# Patient Record
Sex: FEMALE | HISPANIC OR LATINO | Employment: PART TIME | URBAN - METROPOLITAN AREA
[De-identification: names, ages, dates, MRNs, and addresses within clinical notes are randomized per-mention and may not be internally consistent; named-entity substitution may affect disease eponyms.]

---

## 2018-04-24 ENCOUNTER — APPOINTMENT (OUTPATIENT)
Dept: GENERAL RADIOLOGY | Facility: CLINIC | Age: 38
End: 2018-04-24
Attending: EMERGENCY MEDICINE

## 2018-04-24 ENCOUNTER — HOSPITAL ENCOUNTER (EMERGENCY)
Facility: CLINIC | Age: 38
Discharge: HOME | End: 2018-04-25
Attending: EMERGENCY MEDICINE | Admitting: EMERGENCY MEDICINE

## 2018-04-24 DIAGNOSIS — R07.89 ATYPICAL CHEST PAIN: ICD-10-CM

## 2018-04-24 LAB
BASOPHILS # BLD AUTO: 0 10E9/L (ref 0–0.2)
BASOPHILS NFR BLD AUTO: 0.3 %
DIFFERENTIAL METHOD BLD: NORMAL
EOSINOPHIL # BLD AUTO: 0.3 10E9/L (ref 0–0.7)
EOSINOPHIL NFR BLD AUTO: 2.4 %
ERYTHROCYTE [DISTWIDTH] IN BLOOD BY AUTOMATED COUNT: 12.3 % (ref 10–15)
HCT VFR BLD AUTO: 40.2 % (ref 35–47)
HGB BLD-MCNC: 13.9 G/DL (ref 11.7–15.7)
IMM GRANULOCYTES # BLD: 0 10E9/L (ref 0–0.4)
IMM GRANULOCYTES NFR BLD: 0.4 %
LYMPHOCYTES # BLD AUTO: 4.3 10E9/L (ref 0.8–5.3)
LYMPHOCYTES NFR BLD AUTO: 41.7 %
MCH RBC QN AUTO: 30.4 PG (ref 26.5–33)
MCHC RBC AUTO-ENTMCNC: 34.6 G/DL (ref 31.5–36.5)
MCV RBC AUTO: 88 FL (ref 78–100)
MONOCYTES # BLD AUTO: 0.5 10E9/L (ref 0–1.3)
MONOCYTES NFR BLD AUTO: 4.7 %
NEUTROPHILS # BLD AUTO: 5.3 10E9/L (ref 1.6–8.3)
NEUTROPHILS NFR BLD AUTO: 50.5 %
NRBC # BLD AUTO: 0 10*3/UL
NRBC BLD AUTO-RTO: 0 /100
PLATELET # BLD AUTO: 259 10E9/L (ref 150–450)
RBC # BLD AUTO: 4.57 10E12/L (ref 3.8–5.2)
WBC # BLD AUTO: 10.4 10E9/L (ref 4–11)

## 2018-04-24 PROCEDURE — 93005 ELECTROCARDIOGRAM TRACING: CPT | Performed by: EMERGENCY MEDICINE

## 2018-04-24 PROCEDURE — 84484 ASSAY OF TROPONIN QUANT: CPT | Performed by: EMERGENCY MEDICINE

## 2018-04-24 PROCEDURE — 85025 COMPLETE CBC W/AUTO DIFF WBC: CPT | Performed by: EMERGENCY MEDICINE

## 2018-04-24 PROCEDURE — 93010 ELECTROCARDIOGRAM REPORT: CPT | Mod: Z6 | Performed by: EMERGENCY MEDICINE

## 2018-04-24 PROCEDURE — 80048 BASIC METABOLIC PNL TOTAL CA: CPT | Performed by: EMERGENCY MEDICINE

## 2018-04-24 PROCEDURE — 99285 EMERGENCY DEPT VISIT HI MDM: CPT | Mod: 25 | Performed by: EMERGENCY MEDICINE

## 2018-04-24 PROCEDURE — 71045 X-RAY EXAM CHEST 1 VIEW: CPT

## 2018-04-24 PROCEDURE — 99284 EMERGENCY DEPT VISIT MOD MDM: CPT | Mod: 25 | Performed by: EMERGENCY MEDICINE

## 2018-04-24 PROCEDURE — 96374 THER/PROPH/DIAG INJ IV PUSH: CPT | Performed by: EMERGENCY MEDICINE

## 2018-04-24 NOTE — ED AVS SNAPSHOT
[facility], [place], Emergency Department    [address]    [facility] [address]    Phone:  [phone]    Fax:  [phone]                                       Gloria Escobar   MRN: [id number]    Department:  [facility], Emergency Department   Date of Visit:  4/24/2018           After Visit Summary Signature Page     I have received my discharge instructions, and my questions have been answered. I have discussed any challenges I see with this plan with the nurse or doctor.    ..........................................................................................................................................  Patient/Patient Representative Signature      ..........................................................................................................................................  Patient Representative Print Name and Relationship to Patient    ..................................................               ................................................  Date                                            Time    ..........................................................................................................................................  Reviewed by Signature/Title    ...................................................              ..............................................  Date                                                            Time

## 2018-04-24 NOTE — ED AVS SNAPSHOT
[facility], Emergency Department    [address] [place] [address]    Phone:  [phone]    Fax:  [phone]                                       Gloria Escobra   MRN: [id number]    Department:  [facility], Emergency Department   Date of Visit:  4/24/2018           Patient Information     Date Of Birth          1980        Your diagnoses for this visit were:     Atypical chest pain        You were seen by Rivera Torres MD.      Follow-up Information     Follow up with [facility] In 1 week.    Contact information:    [address] [id number] [phone]          Follow up with [facility], Emergency Department.    Specialty:  EMERGENCY MEDICINE    Why:  If symptoms worsen    Contact information:    [address] [place] [id number] [phone]    Additional information:    The [facility] is located in the [facility]. lt is easily accessible from virtually any point in the [facility] area, via [address]        Discharge Instructions         * Dolor En El Pecho:Causa No Determinada [Chest Pain, Uncertain Cause]    Según harmon examen de hoy, no se pudo determinar exactamente por qué siente dolor en el pecho. Harmon afección no parece seria en mellissa momento y el dolor que siente no parece provenir del corazón. Sin embargo, en ocasiones, los síntomas de un problema alexandr tardan más en aparecer. Por lo tanto, es importante que preste atención a las advertencias descritas a continuación.  Cuidados En [place]:  1. Descanse hoy y evite toda actividad agotadora.  2. [place] el medicamento que le hayan recetado según le hayan indicado.  Programe bandar VISITA DE CONTROL con harmon médico en 1-3 días.  Busque Prontamente Atención Médica  si algo de lo siguiente ocurre:    Un cambio en el tipo de dolor: se siente diferente, se ha vuelto más stephanie, dura más o comienza a esparcirse al hombro, el narda, el  clement, la mandíbula o la espalda.    Dificultad para respirar o más dolor al respirar.    Debilidad, mareo o desmayo.    Tos con expulsión de esputo (flema [phlegm]) de color oscuro o con lashanda.    Fiebre de 101 F (38.3 C) o más kalyan.    Dolor, enrojecimiento o hinchazón de bandar pierna.    9888-4952 [employer]. [address] [place] [address]. All rights reserved. This information is not intended as a substitute for professional medical care. Always follow your healthcare professional's instructions.  This information has been modified by your health care provider with permission from the publisher.          24 Hour Appointment Hotline       To make an appointment at any [facility], call [phone] ([phone]). If you don't have a family doctor or clinic, we will help you find one. [facility] are conveniently located to serve the needs of you and your family.             Review of your medicines      Notice     You have not been prescribed any medications.            Procedures and tests performed during your visit     Basic metabolic panel    CBC with platelets differential    EKG 12 lead    EKG 12-lead, tracing only    ISTAT troponin nursing POCT    Troponin I    Troponin POCT    XR Chest Port 1 View      Orders Needing Specimen Collection     None      Pending Results     Date and Time Order Name Status Description    4/24/2018 2338 XR Chest Port 1 View Preliminary     4/24/2018 2314 EKG 12 lead Preliminary             Pending Culture Results     No orders found for last 3 day(s).            Pending Results Instructions     If you had any lab results that were not finalized at the time of your Discharge, you can call the ED Lab Result RN at [phone]. You will be contacted by this team for any positive Lab results or changes in treatment. The nurses are available 7 days a week from 10A to 6:30P.  You can leave a message 24 hours per day and they will return your  "call.        Thank you for choosing [facility]       Thank you for choosing [facility] for your care. Our goal is always to provide you with excellent care. Hearing back from our patients is one way we can continue to improve our services. Please take a few minutes to complete the written survey that you may receive in the mail after you visit with us. Thank you!        [employer] Information     [employer] lets you send messages to your doctor, view your test results, renew your prescriptions, schedule appointments and more. To sign up, go to [email or web address] . Click on \"Log in\" on the left side of the screen, which will take you to the Welcome page. Then click on \"Sign up Now\" on the right side of the page.     You will be asked to enter the access code listed below, as well as some personal information. Please follow the directions to create your username and password.     Your access code is: [email or web address]  Expires: 2018 12:55 AM     Your access code will  in 90 days. If you need help or a new code, please call your [facility] clinic or [phone].        Care EveryWhere ID     This is your Care EveryWhere ID. This could be used by other organizations to access your [facility] medical records  [id number]        Equal Access to Services     NASIR MORA AH: Hadvirgilio grissomo Sosharronali, waaxda luqadaha, qaybta kaalmada adeegyada, bee gibbons. So [facility] [phone].    ATENCIÓN: Si habla español, tiene a harmon disposición servicios gratuitos de asistencia lingüística. Llame al [phone].    We comply with applicable federal civil rights laws and [place] laws. We do not discriminate on the basis of race, color, national origin, age, disability, sex, sexual orientation, or gender identity.            After Visit Summary       This is your record. Keep this with you and show to your community pharmacist(s) and doctor(s) at your next visit.                  "

## 2018-04-25 VITALS
TEMPERATURE: 98.1 F | HEART RATE: 80 BPM | OXYGEN SATURATION: 98 % | SYSTOLIC BLOOD PRESSURE: 141 MMHG | WEIGHT: 203 LBS | RESPIRATION RATE: 16 BRPM | DIASTOLIC BLOOD PRESSURE: 86 MMHG

## 2018-04-25 LAB
ANION GAP SERPL CALCULATED.3IONS-SCNC: 11 MMOL/L (ref 3–14)
BUN SERPL-MCNC: 14 MG/DL (ref 7–30)
CALCIUM SERPL-MCNC: 8.7 MG/DL (ref 8.5–10.1)
CHLORIDE SERPL-SCNC: 103 MMOL/L (ref 94–109)
CO2 SERPL-SCNC: 24 MMOL/L (ref 20–32)
CREAT SERPL-MCNC: 0.55 MG/DL (ref 0.52–1.04)
GFR SERPL CREATININE-BSD FRML MDRD: >90 ML/MIN/1.7M2
GLUCOSE SERPL-MCNC: 393 MG/DL (ref 70–99)
INTERPRETATION ECG - MUSE: NORMAL
POTASSIUM SERPL-SCNC: 4 MMOL/L (ref 3.4–5.3)
SODIUM SERPL-SCNC: 138 MMOL/L (ref 133–144)
TROPONIN I BLD-MCNC: 0 UG/L (ref 0–0.1)
TROPONIN I SERPL-MCNC: <0.015 UG/L (ref 0–0.04)

## 2018-04-25 PROCEDURE — 84484 ASSAY OF TROPONIN QUANT: CPT

## 2018-04-25 PROCEDURE — 25000128 H RX IP 250 OP 636: Performed by: EMERGENCY MEDICINE

## 2018-04-25 RX ORDER — KETOROLAC TROMETHAMINE 15 MG/ML
15 INJECTION, SOLUTION INTRAMUSCULAR; INTRAVENOUS ONCE
Status: COMPLETED | OUTPATIENT
Start: 2018-04-25 | End: 2018-04-25

## 2018-04-25 RX ADMIN — KETOROLAC TROMETHAMINE 15 MG: 15 INJECTION, SOLUTION INTRAMUSCULAR; INTRAVENOUS at 00:29

## 2018-04-25 ASSESSMENT — ENCOUNTER SYMPTOMS
DIAPHORESIS: 0
COUGH: 0
LIGHT-HEADEDNESS: 0
SHORTNESS OF BREATH: 1
NAUSEA: 0
WEAKNESS: 0
NUMBNESS: 1
FEVER: 0
VOMITING: 0
CHILLS: 0

## 2018-04-25 NOTE — DISCHARGE INSTRUCTIONS
* Dolor En El Pecho:Causa No Determinada [Chest Pain, Uncertain Cause]    Según harmon examen de hoy, no se pudo determinar exactamente por qué siente dolor en el pecho. Harmon afección no parece seria en mellissa momento y el dolor que siente no parece provenir del corazón. Sin embargo, en ocasiones, los síntomas de un problema alexandr tardan más en aparecer. Por lo tanto, es importante que preste atención a las advertencias descritas a continuación.  Cuidados En [place]:  1. Descanse hoy y evite toda actividad agotadora.  2. [place] el medicamento que le hayan recetado según le hayan indicado.  Programe bandar VISITA DE CONTROL con harmon médico en 1-3 días.  Busque Prontamente Atención Médica  si algo de lo siguiente ocurre:    Un cambio en el tipo de dolor: se siente diferente, se ha vuelto más stephanie, dura más o comienza a esparcirse al hombro, el brazo, el clement, la mandíbula o la espalda.    Dificultad para respirar o más dolor al respirar.    Debilidad, mareo o desmayo.    Tos con expulsión de esputo (flema [phlegm]) de color oscuro o con lashanda.    Fiebre de 101 F (38.3 C) o más kalyan.    Dolor, enrojecimiento o hinchazón de bandar pierna.    8098-1889 [employer]. [address] [place] [address], [place] [address]. All rights reserved. This information is not intended as a substitute for professional medical care. Always follow your healthcare professional's instructions.  This information has been modified by your health care provider with permission from the publisher.

## 2018-04-25 NOTE — ED PROVIDER NOTES
"  History     Chief Complaint   Patient presents with     Chest Pain     left sided chest pain for two days, started when she was cleaning, reports she was told she has a vessel in her hear that is smaller than normal, shortness of breath     The history is provided by the patient. The history is limited by a language barrier. A  was used.     Gloria Escobar is a 37 year old female who presents to the ED for the evaluation of chest pain. The patient reports that she has been experiencing chest pain and numbness in her left hand for the past month, but over the past two days has now had worsening left-sided, nonradiating chest pain. In addition, her left hand numbness is now more persistent where it has been present for the past two days. She denies any obvious precipitating or palliative factors. No recorded fevers, chills, or coughing. No associated trauma. The patient reports a history of a \"small vessel\" in her heart that was diagnosed years ago, and as such, she presents for evaluation with worry that this may be related to her heart.          PAST MEDICAL HISTORY: No past medical history on file.    PAST SURGICAL HISTORY: No past surgical history on file.    FAMILY HISTORY: No family history on file.    SOCIAL HISTORY:   Social History   Substance Use Topics     Smoking status: Never Smoker     Smokeless tobacco: Never Used     Alcohol use No       Patient's Medications    No medications on file        No Known Allergies      I have reviewed the Medications, Allergies, Past Medical and Surgical History, and Social History in the Epic system.    Review of Systems   Constitutional: Negative for chills, diaphoresis and fever.   Respiratory: Positive for shortness of breath. Negative for cough.    Cardiovascular: Positive for chest pain.   Gastrointestinal: Negative for nausea and vomiting.   Neurological: Positive for numbness. Negative for weakness and light-headedness.   All " other systems reviewed and are negative.      Physical Exam   BP: (!) 141/117  Pulse: 69  Temp: 98.3  F (36.8  C)  Resp: 16  Weight: 92.1 kg (203 lb)  SpO2: 99 %      Physical Exam   Constitutional: She is oriented to person, place, and time. She appears well-developed. No distress.   HENT:   Head: Normocephalic and atraumatic.   Mouth/Throat: Oropharynx is clear and moist.   Eyes: Pupils are equal, round, and reactive to light.   Cardiovascular: Normal rate, regular rhythm and normal heart sounds.    Pulmonary/Chest: Effort normal. No stridor. No respiratory distress. She has no wheezes. She has no rales. She exhibits no tenderness.   Abdominal: Soft. She exhibits no distension. There is no tenderness. There is no rebound.   Musculoskeletal: She exhibits no edema.   Neurological: She is alert and oriented to person, place, and time. No cranial nerve deficit.   Skin: Skin is warm. No rash noted. She is not diaphoretic.   Psychiatric: She has a normal mood and affect.       ED Course     ED Course     Procedures       Labs Ordered and Resulted from Time of ED Arrival Up to the Time of Departure from the ED   CBC WITH PLATELETS DIFFERENTIAL   BASIC METABOLIC PANEL   TROPONIN I   ISTAT TROPONIN NURSING POCT            Assessments & Plan (with Medical Decision Making)     37-year-old female without known segment past medical history arriving to the emergency department with a catching sensation in her chest leading to intermittent coughing.  Upon arrival she is noted to be alert, afebrile, and hemodynamically stable with initial note of hypertension however on recheck noted to have diastolic pressures in the 80s.  Externally the patient has no sign of external trauma to the chest or skin rash.  She speaking full sentences without evidence of increased work of breathing.  She has no pleuritic type chest pain.  The patient is PERC negative and my suspicion at this time for pulmonary embolus warranting CT scan is quite  low.  By history this sounds atypical for aortic pathology, pneumothorax, pneumonia.  Chest x-ray is clear.  The patient has had approximately 1 month of symptoms somewhat worse over the past 1 day.  This sounds atypical for acute coronary syndrome.  Troponin obtained is negative.  EKG demonstrates no sign of strain or ischemic type pattern.  At this time I do not believe she benefit from serial cardiac biomarkers.  I do have her believe she benefit from close outpatient follow-up with ongoing chest discomfort.  I discussed case with the patient and  who indicates understanding of when to call or return emergently such as increasing chest pain, lightheadedness, shortness of breath or other concern.    I have reviewed the nursing notes.    I have reviewed the findings, diagnosis, plan and need for follow up with the patient.    New Prescriptions    No medications on file       Final diagnoses:   Atypical chest pain   Fan RODARTE, am serving as a trained medical scribe to document services personally performed by Rivera Torres MD, based on the provider's statements to me.      Rivera RODARTE MD, was physically present and have reviewed and verified the accuracy of this note documented by Fan Montanez.       4/24/2018   [facility], EMERGENCY DEPARTMENT     Rivera Torres MD  04/25/18 0124

## 2019-04-01 ENCOUNTER — TRANSFERRED RECORDS (OUTPATIENT)
Dept: MULTI SPECIALTY CLINIC | Facility: CLINIC | Age: 39
End: 2019-04-01

## 2019-04-01 LAB — PAP SMEAR - HIM PATIENT REPORTED: NEGATIVE

## 2020-03-24 ENCOUNTER — TELEPHONE (OUTPATIENT)
Dept: OBGYN | Facility: CLINIC | Age: 40
End: 2020-03-24

## 2020-03-24 ENCOUNTER — PRENATAL OFFICE VISIT (OUTPATIENT)
Dept: NURSING | Facility: CLINIC | Age: 40
End: 2020-03-24

## 2020-03-24 VITALS
TEMPERATURE: 97.3 F | DIASTOLIC BLOOD PRESSURE: 86 MMHG | HEART RATE: 80 BPM | BODY MASS INDEX: 30.45 KG/M2 | HEIGHT: 67 IN | SYSTOLIC BLOOD PRESSURE: 139 MMHG | WEIGHT: 194 LBS

## 2020-03-24 DIAGNOSIS — O09.529 HIGH-RISK PREGNANCY, ELDERLY MULTIGRAVIDA, UNSPECIFIED TRIMESTER: Primary | ICD-10-CM

## 2020-03-24 DIAGNOSIS — E11.9 TYPE 2 DIABETES MELLITUS (H): ICD-10-CM

## 2020-03-24 DIAGNOSIS — I10 HTN (HYPERTENSION): ICD-10-CM

## 2020-03-24 DIAGNOSIS — I10 HTN (HYPERTENSION): Primary | ICD-10-CM

## 2020-03-24 LAB
ABO + RH BLD: NORMAL
ABO + RH BLD: NORMAL
ALBUMIN UR-MCNC: NEGATIVE MG/DL
ALT SERPL W P-5'-P-CCNC: 35 U/L (ref 0–50)
APPEARANCE UR: CLEAR
AST SERPL W P-5'-P-CCNC: 15 U/L (ref 0–45)
BILIRUB UR QL STRIP: NEGATIVE
BLD GP AB SCN SERPL QL: NORMAL
BLOOD BANK CMNT PATIENT-IMP: NORMAL
COLOR UR AUTO: YELLOW
CREAT SERPL-MCNC: 0.52 MG/DL (ref 0.52–1.04)
CREAT UR-MCNC: 66 MG/DL
ERYTHROCYTE [DISTWIDTH] IN BLOOD BY AUTOMATED COUNT: 13.8 % (ref 10–15)
GFR SERPL CREATININE-BSD FRML MDRD: >90 ML/MIN/{1.73_M2}
GLUCOSE UR STRIP-MCNC: >=1000 MG/DL
HBA1C MFR BLD: 8.5 % (ref 0–5.6)
HCT VFR BLD AUTO: 38.7 % (ref 35–47)
HGB BLD-MCNC: 13.4 G/DL (ref 11.7–15.7)
HGB UR QL STRIP: NEGATIVE
KETONES UR STRIP-MCNC: NEGATIVE MG/DL
LEUKOCYTE ESTERASE UR QL STRIP: NEGATIVE
MCH RBC QN AUTO: 30.9 PG (ref 26.5–33)
MCHC RBC AUTO-ENTMCNC: 34.6 G/DL (ref 31.5–36.5)
MCV RBC AUTO: 89 FL (ref 78–100)
NITRATE UR QL: NEGATIVE
PH UR STRIP: 5.5 PH (ref 5–7)
PLATELET # BLD AUTO: 276 10E9/L (ref 150–450)
PROT UR-MCNC: <0.05 G/L
PROT/CREAT 24H UR: NORMAL G/G CR (ref 0–0.2)
RBC # BLD AUTO: 4.34 10E12/L (ref 3.8–5.2)
SOURCE: ABNORMAL
SP GR UR STRIP: 1.01 (ref 1–1.03)
SPECIMEN EXP DATE BLD: NORMAL
TSH SERPL DL<=0.005 MIU/L-ACNC: 1.25 MU/L (ref 0.4–4)
UROBILINOGEN UR STRIP-ACNC: 0.2 EU/DL (ref 0.2–1)
WBC # BLD AUTO: 7.8 10E9/L (ref 4–11)

## 2020-03-24 PROCEDURE — 87086 URINE CULTURE/COLONY COUNT: CPT | Performed by: OBSTETRICS & GYNECOLOGY

## 2020-03-24 PROCEDURE — 87389 HIV-1 AG W/HIV-1&-2 AB AG IA: CPT | Performed by: OBSTETRICS & GYNECOLOGY

## 2020-03-24 PROCEDURE — 99207 ZZC NO CHARGE NURSE ONLY: CPT

## 2020-03-24 PROCEDURE — 85027 COMPLETE CBC AUTOMATED: CPT | Performed by: OBSTETRICS & GYNECOLOGY

## 2020-03-24 PROCEDURE — 86900 BLOOD TYPING SEROLOGIC ABO: CPT | Performed by: OBSTETRICS & GYNECOLOGY

## 2020-03-24 PROCEDURE — 81003 URINALYSIS AUTO W/O SCOPE: CPT | Performed by: OBSTETRICS & GYNECOLOGY

## 2020-03-24 PROCEDURE — 83036 HEMOGLOBIN GLYCOSYLATED A1C: CPT | Performed by: OBSTETRICS & GYNECOLOGY

## 2020-03-24 PROCEDURE — 84460 ALANINE AMINO (ALT) (SGPT): CPT | Performed by: OBSTETRICS & GYNECOLOGY

## 2020-03-24 PROCEDURE — 84450 TRANSFERASE (AST) (SGOT): CPT | Performed by: OBSTETRICS & GYNECOLOGY

## 2020-03-24 PROCEDURE — 99000 SPECIMEN HANDLING OFFICE-LAB: CPT | Performed by: OBSTETRICS & GYNECOLOGY

## 2020-03-24 PROCEDURE — 82565 ASSAY OF CREATININE: CPT | Performed by: OBSTETRICS & GYNECOLOGY

## 2020-03-24 PROCEDURE — 86901 BLOOD TYPING SEROLOGIC RH(D): CPT | Performed by: OBSTETRICS & GYNECOLOGY

## 2020-03-24 PROCEDURE — 83021 HEMOGLOBIN CHROMOTOGRAPHY: CPT | Mod: 90 | Performed by: OBSTETRICS & GYNECOLOGY

## 2020-03-24 PROCEDURE — 36415 COLL VENOUS BLD VENIPUNCTURE: CPT | Performed by: OBSTETRICS & GYNECOLOGY

## 2020-03-24 PROCEDURE — 87340 HEPATITIS B SURFACE AG IA: CPT | Performed by: OBSTETRICS & GYNECOLOGY

## 2020-03-24 PROCEDURE — 84156 ASSAY OF PROTEIN URINE: CPT | Performed by: OBSTETRICS & GYNECOLOGY

## 2020-03-24 PROCEDURE — 86762 RUBELLA ANTIBODY: CPT | Performed by: OBSTETRICS & GYNECOLOGY

## 2020-03-24 PROCEDURE — 86850 RBC ANTIBODY SCREEN: CPT | Performed by: OBSTETRICS & GYNECOLOGY

## 2020-03-24 PROCEDURE — 84443 ASSAY THYROID STIM HORMONE: CPT | Performed by: OBSTETRICS & GYNECOLOGY

## 2020-03-24 PROCEDURE — 86780 TREPONEMA PALLIDUM: CPT | Performed by: OBSTETRICS & GYNECOLOGY

## 2020-03-24 RX ORDER — LISINOPRIL 20 MG/1
TABLET ORAL
COMMUNITY
Start: 2020-01-09 | End: 2020-05-05

## 2020-03-24 RX ORDER — METFORMIN HCL 500 MG
TABLET, EXTENDED RELEASE 24 HR ORAL
Status: ON HOLD | COMMUNITY
Start: 2020-01-09 | End: 2020-10-25

## 2020-03-24 ASSESSMENT — MIFFLIN-ST. JEOR: SCORE: 1579.67

## 2020-03-24 NOTE — LETTER
March 25, 2020      Gloria Mariano Shawn  [address] [facility] [address]        Dear Ms.Sanchez Escobar,    We are writing to inform you of your test results.    Your test results fall within the expected range(s) or remain unchanged from previous results.  Please continue with current treatment plan.    Resulted Orders   ABO/Rh type and screen   Result Value Ref Range    ABO A     RH(D) Pos     Antibody Screen Neg     Test Valid Only At          [facility]    Specimen Expires 03/27/2020    Hepatitis B surface antigen   Result Value Ref Range    Hep B Surface Agn Nonreactive NR^Nonreactive   CBC with platelets   Result Value Ref Range    WBC 7.8 4.0 - 11.0 10e9/L    RBC Count 4.34 3.8 - 5.2 10e12/L    Hemoglobin 13.4 11.7 - 15.7 g/dL    Hematocrit 38.7 35.0 - 47.0 %    MCV 89 78 - 100 fl    MCH 30.9 26.5 - 33.0 pg    MCHC 34.6 31.5 - 36.5 g/dL    RDW 13.8 10.0 - 15.0 %    Platelet Count 276 150 - 450 10e9/L   HIV Antigen Antibody Combo   Result Value Ref Range    HIV Antigen Antibody Combo Nonreactive NR^Nonreactive          Comment:      HIV-1 p24 Ag & HIV-1/HIV-2 Ab Not Detected   Rubella Antibody IgG Quantitative   Result Value Ref Range    Rubella Antibody IgG Quantitative 78 IU/mL      Comment:      Positive.  Suggests previous exposure or immunization and probable immunity  Reference Range:    Unvaccinated Negative 0-7 IU/mL  Vaccinated or previous exposure Positive 10 IU/ml or greater     Treponema Abs w Reflex to RPR and Titer   Result Value Ref Range    Treponema Antibodies Nonreactive NR^Nonreactive      Comment:      Methodology Change: Test performed on the [employer] XL by Treponema   pallidum Total Antibodies Assay as of 3.17.2020.     Urine Culture Aerobic Bacterial   Result Value Ref Range    Specimen Description Midstream Urine     Culture Micro       50,000 to 100,000 colonies/mL  mixed urogenital ector  Susceptibility testing not  routinely done     UA without Microscopic   Result Value Ref Range    Color Urine Yellow     Appearance Urine Clear     Glucose Urine >=1000 (A) NEG^Negative mg/dL    Bilirubin Urine Negative NEG^Negative    Ketones Urine Negative NEG^Negative mg/dL    Specific Gravity Urine 1.015 1.003 - 1.035    Blood Urine Negative NEG^Negative    pH Urine 5.5 5.0 - 7.0 pH    Protein Albumin Urine Negative NEG^Negative mg/dL    Urobilinogen Urine 0.2 0.2 - 1.0 EU/dL    Nitrite Urine Negative NEG^Negative    Leukocyte Esterase Urine Negative NEG^Negative    Source Midstream Urine    Protein  random urine with Creat Ratio   Result Value Ref Range    Protein Random Urine <0.05 g/L    Protein Total Urine g/gr Creatinine Unable to calculate due to low value 0 - 0.2 g/g Cr   Creatinine   Result Value Ref Range    Creatinine 0.52 0.52 - 1.04 mg/dL    GFR Estimate >90 >60 mL/min/[1.73_m2]      Comment:      Non  GFR Calc  Starting 12/18/2018, serum creatinine based estimated GFR (eGFR) will be   calculated using the Chronic Kidney Disease Epidemiology Collaboration   (CKD-EPI) equation.      GFR Estimate If Black >90 >60 mL/min/[1.73_m2]      Comment:       GFR Calc  Starting 12/18/2018, serum creatinine based estimated GFR (eGFR) will be   calculated using the Chronic Kidney Disease Epidemiology Collaboration   (CKD-EPI) equation.     AST   Result Value Ref Range    AST 15 0 - 45 U/L   ALT   Result Value Ref Range    ALT 35 0 - 50 U/L   Hemoglobin A1c   Result Value Ref Range    Hemoglobin A1C 8.5 (H) 0 - 5.6 %      Comment:      Normal <5.7% Prediabetes 5.7-6.4%  Diabetes 6.5% or higher - adopted from ADA   consensus guidelines.  Results confirmed by repeat test     TSH with free T4 reflex   Result Value Ref Range    TSH 1.25 0.40 - 4.00 mU/L   Creatinine urine calculation only   Result Value Ref Range    Creatinine Urine 66 mg/dL       If you have any questions or concerns, please call the clinic at the  number listed above.       Sincerely,        Tatiana Siddiqi

## 2020-03-24 NOTE — LETTER
March 24, 2020      Gloria Escobar  [address] [facility] [address]        Dear Ms.Sanchez Escobar,    We are writing to inform you of your test results.    Test results indicate you may require additional follow up, see comment below.     Your hemoglobin A1c is significantly elevated and consistent with a diagnosis of uncontrolled diabetes.  We need you to see Diabetes Education ASAP to help manage this during your pregnancy.      Resulted Orders   CBC with platelets   Result Value Ref Range    WBC 7.8 4.0 - 11.0 10e9/L    RBC Count 4.34 3.8 - 5.2 10e12/L    Hemoglobin 13.4 11.7 - 15.7 g/dL    Hematocrit 38.7 35.0 - 47.0 %    MCV 89 78 - 100 fl    MCH 30.9 26.5 - 33.0 pg    MCHC 34.6 31.5 - 36.5 g/dL    RDW 13.8 10.0 - 15.0 %    Platelet Count 276 150 - 450 10e9/L   UA without Microscopic   Result Value Ref Range    Color Urine Yellow     Appearance Urine Clear     Glucose Urine >=1000 (A) NEG^Negative mg/dL    Bilirubin Urine Negative NEG^Negative    Ketones Urine Negative NEG^Negative mg/dL    Specific Gravity Urine 1.015 1.003 - 1.035    Blood Urine Negative NEG^Negative    pH Urine 5.5 5.0 - 7.0 pH    Protein Albumin Urine Negative NEG^Negative mg/dL    Urobilinogen Urine 0.2 0.2 - 1.0 EU/dL    Nitrite Urine Negative NEG^Negative    Leukocyte Esterase Urine Negative NEG^Negative    Source Midstream Urine    Creatinine   Result Value Ref Range    Creatinine 0.52 0.52 - 1.04 mg/dL    GFR Estimate >90 >60 mL/min/[1.73_m2]      Comment:      Non  GFR Calc  Starting 12/18/2018, serum creatinine based estimated GFR (eGFR) will be   calculated using the Chronic Kidney Disease Epidemiology Collaboration   (CKD-EPI) equation.      GFR Estimate If Black >90 >60 mL/min/[1.73_m2]      Comment:       GFR Calc  Starting 12/18/2018, serum creatinine based estimated GFR (eGFR) will be   calculated using the Chronic Kidney Disease Epidemiology Collaboration   (CKD-EPI) equation.      AST   Result Value Ref Range    AST 15 0 - 45 U/L   ALT   Result Value Ref Range    ALT 35 0 - 50 U/L   Hemoglobin A1c   Result Value Ref Range    Hemoglobin A1C 8.5 (H) 0 - 5.6 %      Comment:      Normal <5.7% Prediabetes 5.7-6.4%  Diabetes 6.5% or higher - adopted from ADA   consensus guidelines.  Results confirmed by repeat test     TSH with free T4 reflex   Result Value Ref Range    TSH 1.25 0.40 - 4.00 mU/L       If you have any questions or concerns, please call the clinic at the number listed above.       Sincerely,        Radha Watson MD

## 2020-03-24 NOTE — PROGRESS NOTES
Patient seen today in clinic for NOB nurse intake( with  on the phone), third pregnancy, AMA.  Patient was diagnosis with hypertension and Type 2 diabetes about 1 year ago. She was taking metformin and lisinopril until 2 months ago when she found out she was pregnant. Hypertensive labs drawn today plus A1C with NOB labs. TSH drawn , patient's mother has history of thyroid cancer   Patient left without giving her advice about the medication, she was told to wait while consulting a MD    Patient stated that she was told by her primary to stop taking the medication until seen by OB  Handouts reviewed and given. Discussed NIPT. Has ultrasound and NOB with Dr Albarran 4/09/2020  Sent TE to Dr Moreno ( ON CALL) regarding this patient. Needs , understands some english  Patient denies any problems with her pregnancies. Advised patient to take her blood sugars daily and keep a log,  Referral in for diabetic educator      Patient supplied answers from flow sheet for:  Prenatal OB Questionnaire.  Past Medical History  Diabetes?: (!) Yes  Hypertension : (!) Yes  Heart disease, mitral valve prolapse or rheumatic fever?: No  An autoimmune disease such as lupus or rheumatoid arthritis?: No  Kidney disease or urinary tract infection?: No  Epilepsy, seizures or spells?: No  Migraine headaches?: No  A stroke or loss of function or sensation?: No  Any other neurological problems?: No  Have you ever been treated for depression?: No  Are you having problems with crying spells or loss of self-esteem?: No  Have you ever required psychiatric care?: No  Have you ever had hepatitis, liver disease or jaundice?: No  Have you been treated for blood clots in your veins, deep vein thrombosis, inflammation in the veins, thrombosis, phlebitis, pulmonary embolism or varicosities?: No  Have you had excessive bleeding after surgery or dental work?: No  Do you bleed more than other women after a cut or scratch?: No  Do you have a  history of anemia?: No  Have you ever had thyroid problems or taken thyroid medication?: No   Do you have any endocrine problems?: No  Have you ever been in a major accident or suffered serious trauma?: No  Within the last year, has anyone hit, slapped, kicked or otherwise hurt you?: No  In the last year, has anyone forced you to have sex when you didn't want to?: No    Past Medical History 2   Have you ever received a blood transfusion?: No  Would you refuse a blood transfusion if a doctor judged it to be medically necessary?: No   If you answered Yes, would you rather die than receive a blood transfusion?: No  If you answered Yes, is this for Pentecostalism reasons?: No  Does anyone in your home smoke?: No  Do you use tobacco products?: No  Do you drink beer, wine or hard liquor?: No  Do you use any of the following: marijuana, speed, cocaine, heroin, hallucinogens or other drugs?: No   Is your blood type Rh negative?: No  Have you ever had abnormal antibodies in your blood?: No  Have you ever had asthma?: No  Have you ever had tuberculosis?: No  Do you have any allergies to drugs or over-the-counter medications?: No  Allergies: Dust Mites, Aspartame, Ethanol, Venlafaxine, Hydrochloride, Sertraline: No  Have you had any breast problems?: No  Have you ever ?: (!) Yes  Have you had any gynecological surgical procedures such as cervical conization, a LEEP procedure, laser treatment, cryosurgery of the cervix or a dilation and curettage, etc?: No  Have you ever had any other surgical procedures?: (!) Yes(removed gallblader 16 years ago)  Have you been hospitalized for a nonsurgical reason excluding normal delivery?: No  Have you ever had any anesthetic complications?: No  Have you ever had an abnormal pap smear?: No    Past Medical History (Continued)  Do you have a history of abnormalities of the uterus?: No  Did your mother take ANA or any other hormones when she was pregnant with you?: No  Did it take you more  than a year to become pregnant?: No  Have you ever been evaluated or treated for infertility?: No  Is there a history of medical problems in your family, which you feel may be important to this pregnancy?: No  Do you have any other problems we have not asked about which you feel may be important to this pregnancy?: No    Symptoms since last menstrual period  Do you have any of the following symptoms: abdominal pain, blood in stools or urine, chest pain, shortness of breath, coughing or vomiting up blood, your heart racing or skipping beats, nausea and vomiting, pain on urination or vaginal discharge or bleed: No  Will the patient be 35 years old or older at the time of delivery?: No    Has the patient, baby's father or anyone in either family had:  Thalassemia (Italian, Greek, Mediterranean or  background only) and an MCV result less than 80?: No  Neural tube defect such as meningomyelocele, spina bifida or anencephaly?: No  Congenital heart defect?: No  Down's Syndrome?: No  Dionte-Sachs disease (Scientology, Cajun, Chinese-[place])?: No  Sickle cell disease or trait ()?: No  Hemophilia or other inherited problems of blood?: No  Muscular dystrophy?: No  Cystic fibrosis?: No  Marina's chorea?: No  Mental retardation/autism?: No  If yes, was the person tested for fragile X?: No  Any other inherited genetic or chromosomal disorder?: No  Maternal metabolic disorder (e.g Insulin-dependent diabetes, PKU)?: No  A child with birth defects not listed above?: No  Recurrent pregnancy loss or stillbirth?: No   Has the patient had any medications/street drugs/alcohol since her last menstrual period?: No  Does the patient or baby's father have any other genetic risks?: No    Infection History   Do you object to being tested for Hepatitis B?: No  Do you object to being tested for HIV?: No   Do you feel that you are at high risk for coming in contact with the AIDS virus?: No  Have you ever been treated for tuberculosis?:  No  Have you ever had a positive skin test for tuberculosis?: No  Do you live with someone who has tuberculosis?: No  Have you ever been exposed to tuberculosis?: No  Do you have genital herpes?: No  Does your partner have genital herpes?: No  Have you had a viral illness since your last period?: No  Have you ever had gonorrhea, chlamydia, syphilis, venereal warts, trichomoniasis, pelvic inflammatory disease or any other sexually transmitted disease?: No  Do you know if you are a genital group B streptococcus carrier?: No  Have you had chicken pox/varicella?: No   Have you been vaccinated against chicken Pox?: No  Have you had any other infectious diseases?: No

## 2020-03-24 NOTE — TELEPHONE ENCOUNTER
Patient seen today in clinic for NOB nurse intake, third pregnancy, AMA.  Patient was diagnosis with hypertension and Type 2 diabetes about 1 year ago. She was taking metformin and lisinopril until 2 months ago when she found out she was pregnant. Hypertensive labs drawn today plus A1C with NOB labs.  Patient left without giving her advice about the medication, she was told to wait while consulting a MD      Referral sent for diabetic educator. She will keep daily log on blood sugars       Pharmacy updated

## 2020-03-25 ENCOUNTER — APPOINTMENT (OUTPATIENT)
Dept: INTERPRETER SERVICES | Facility: CLINIC | Age: 40
End: 2020-03-25

## 2020-03-25 LAB
BACTERIA SPEC CULT: NORMAL
HBV SURFACE AG SERPL QL IA: NONREACTIVE
HGB A1 MFR BLD: 96.7 % (ref 95–97.9)
HGB A2 MFR BLD: 2.9 % (ref 2–3.5)
HGB C MFR BLD: 0 % (ref 0–0)
HGB E MFR BLD: 0 % (ref 0–0)
HGB F MFR BLD: 0.4 % (ref 0–2.1)
HGB FRACT BLD ELPH-IMP: NORMAL
HGB OTHER MFR BLD: 0 % (ref 0–0)
HGB S BLD QL SOLY: NORMAL
HGB S MFR BLD: 0 % (ref 0–0)
HIV 1+2 AB+HIV1 P24 AG SERPL QL IA: NONREACTIVE
PATH INTERP BLD-IMP: NORMAL
RUBV IGG SERPL IA-ACNC: 78 IU/ML
SPECIMEN SOURCE: NORMAL
T PALLIDUM AB SER QL: NONREACTIVE

## 2020-03-25 RX ORDER — NIFEDIPINE 30 MG/1
30 TABLET, EXTENDED RELEASE ORAL DAILY
Qty: 90 TABLET | Refills: 1 | Status: ON HOLD | OUTPATIENT
Start: 2020-03-25 | End: 2020-10-25

## 2020-03-25 NOTE — TELEPHONE ENCOUNTER
Pt called back with . She is already scheduled for diabetic ed.  Discussed starting medication for blood sugars.  She has BP cuff at home and will start checking BP daily.  Procardia sent to pharmacy per note from Dr. Moreno.  Shira Lakhani RN

## 2020-03-25 NOTE — TELEPHONE ENCOUNTER
A1C was elevated and pt sent to diabetic ed already. Will need insulin.    We need to find out if she has a blood pressure cuff at home and then could start her on procardia 30 mg daily. She needs to be able to monitor pressures at home.     Initial bp is elevated and should be on something for cHTN since on meds prior.    Thanks  Lillie Moreno MD

## 2020-03-31 ENCOUNTER — ALLIED HEALTH/NURSE VISIT (OUTPATIENT)
Dept: EDUCATION SERVICES | Facility: CLINIC | Age: 40
End: 2020-03-31

## 2020-03-31 DIAGNOSIS — O24.419 GDM (GESTATIONAL DIABETES MELLITUS): Primary | ICD-10-CM

## 2020-03-31 PROCEDURE — 98968 PH1 ASSMT&MGMT NQHP 21-30: CPT | Performed by: DIETITIAN, REGISTERED

## 2020-03-31 NOTE — LETTER
"    3/31/2020         RE: Gloria Escobar  [address]  [facility] [address]        Dear Colleague,    Thank you for referring your patient, Gloria Escobar, to the [facility]. Please see a copy of my visit note below.    Diabetes Self-Management Education & Support    Patient verbally consented to the telephone visit service today: yes      SUBJECTIVE/OBJECTIVE:  Presents for education related to pregnancy during Type 2 Diabetes.   Accompanied by:     Cultural Influences/Ethnic Background:  German    Estimated Date of Delivery: Oct 26, 2020    1 hour OGTT  No results found for: GLU1    3 hour OGTT    Fasting  No results found for: GLF    1 hour  No results found for: GL1    2 hour  No results found for: GL2    3 hour  No results found for: GL3    Lifestyle and Health Behaviors:  Pre-pregnancy weight (lbs): (not sure)  Current stated weight: 194#   States \"weight hasn't changed much\"  Exercise:: Yes  Days per week of moderate to strenuous exercise (like a brisk walk): 5  On average, minutes per day of exercise at this level: 30  How intense was your typical exercise? : Light (like stretching or slow walking)  Exercise Minutes per Week: 150  Barrier to exercise: None  Meals include: Breakfast, Lunch, Dinner  Beverages: Water, Tea(stopped juice and soda)  Cultural/Sikh diet restrictions?: No  Pre-guerrero vitamin?: Yes  Supplements?: No  Experiencing nausea?: No    Healthy Coping:  Emotional response to diabetes: Ready to learn, Concern for health and well-being  Informal Support system:: Family  Stage of change: ACTION (Actively working towards change)    Current Management:  Taking medications for diabetes?: No    ASSESSMENT/ INTERVENTION:    -Reports Type 2 DM diagnosed ~1 year ago.  -Pt has discontinued Metformin on own, pending discussion with upcoming OB provider visit.  -Pt reports trying to eat healthier- is avoiding sweets, no longer " drinking regular soda or juice. Previously took a diabetes class, is carbohydrate aware.  -Pt has previously done BG monitoring, however reports current meter is broken.  Pt prefers we order a replacement meter as pt has test strips would like to use up. Pt however did not recall the brand  (pt was not at home during phone call),  but agreed to call us back with that information.     Educational topics covered today:  GDM diagnosis, pathophysiology, Risks and Complications of GDM, Means of controlling GDM, Using a Blood Glucose Monitor, Blood Glucose Goals, Logging and Interpreting Glucose Results, Ketone Testing, When to Call a Diabetes Educator or OB Provider, Healthy Eating During Pregnancy, Counting Carbohydrates, Meal Planning for GDM, and Physical Activity    Pt verbalized understanding of concepts discussed and recommendations provided today.     Educational materials to be sent to patient:   Leeanne Fregoso Gestational Diabetes  GDM Log Book  Sharps Disposal  Care After Delivery  (e-mailed English and Chilean materials as well)    PLAN:  Check glucose 4 times daily, before breakfast and 1 hour after each meal. Pt will call us with name of BG meter to prescribe per her request.     Check Ketones daily for one week, if negative, reduce testing to once a week.     Physical activity recommended: continue regular walking.    Meal plan: 2-3 carbs at breakfast, 3-4 carbs at lunch, 3-4 carbs at supper, 1-2 carbs at 3 snacks a day.  Follow consistent CHO meal plan, eat CHO and protein/fat at all meals/snacks.    Call/e-mail/MyChart message diabetes educator if 3 or more blood sugars are above the goal in 1 week, if ketones are positive, or with questions/concerns.    Rayne Negrete RD, CDE  Diabetes     Time Spent: 50 minutes  Encounter Type: Individual Telephone    Any diabetes medication dose changes were made via the CDE Protocol and Collaborative Practice Agreement with the patient's  OB/GYN provider. A copy of this encounter was shared with the provider.

## 2020-03-31 NOTE — PROGRESS NOTES
"Diabetes Self-Management Education & Support    Patient verbally consented to the telephone visit service today: yes      SUBJECTIVE/OBJECTIVE:  Presents for education related to pregnancy during Type 2 Diabetes.   Accompanied by:     Cultural Influences/Ethnic Background:  Sudanese    Estimated Date of Delivery: Oct 26, 2020    1 hour OGTT  No results found for: GLU1    3 hour OGTT    Fasting  No results found for: GLF    1 hour  No results found for: GL1    2 hour  No results found for: GL2    3 hour  No results found for: GL3    Lifestyle and Health Behaviors:  Pre-pregnancy weight (lbs): (not sure)  Current stated weight: 194#   States \"weight hasn't changed much\"  Exercise:: Yes  Days per week of moderate to strenuous exercise (like a brisk walk): 5  On average, minutes per day of exercise at this level: 30  How intense was your typical exercise? : Light (like stretching or slow walking)  Exercise Minutes per Week: 150  Barrier to exercise: None  Meals include: Breakfast, Lunch, Dinner  Beverages: Water, Tea(stopped juice and soda)  Cultural/Orthodoxy diet restrictions?: No  Pre- vitamin?: Yes  Supplements?: No  Experiencing nausea?: No    Healthy Coping:  Emotional response to diabetes: Ready to learn, Concern for health and well-being  Informal Support system:: Family  Stage of change: ACTION (Actively working towards change)    Current Management:  Taking medications for diabetes?: No    ASSESSMENT/ INTERVENTION:    -Reports Type 2 DM diagnosed ~1 year ago.  -Pt has discontinued Metformin on own, pending discussion with upcoming OB provider visit.  -Pt reports trying to eat healthier- is avoiding sweets, no longer drinking regular soda or juice. Previously took a diabetes class, is carbohydrate aware.  -Pt has previously done BG monitoring, however reports current meter is broken.  Pt prefers we order a replacement meter as pt has test strips would like to use up. Pt however did not recall the " brand  (pt was not at home during phone call),  but agreed to call us back with that information.     Educational topics covered today:  GDM diagnosis, pathophysiology, Risks and Complications of GDM, Means of controlling GDM, Using a Blood Glucose Monitor, Blood Glucose Goals, Logging and Interpreting Glucose Results, Ketone Testing, When to Call a Diabetes Educator or OB Provider, Healthy Eating During Pregnancy, Counting Carbohydrates, Meal Planning for GDM, and Physical Activity    Pt verbalized understanding of concepts discussed and recommendations provided today.     Educational materials to be sent to patient:   Leeanne Fregoso Gestational Diabetes  GDM Log Book  Sharps Disposal  Care After Delivery  (e-mailed English and English materials as well)    PLAN:  Check glucose 4 times daily, before breakfast and 1 hour after each meal. Pt will call us with name of BG meter to prescribe per her request.     Check Ketones daily for one week, if negative, reduce testing to once a week.     Physical activity recommended: continue regular walking.    Meal plan: 2-3 carbs at breakfast, 3-4 carbs at lunch, 3-4 carbs at supper, 1-2 carbs at 3 snacks a day.  Follow consistent CHO meal plan, eat CHO and protein/fat at all meals/snacks.    Call/e-mail/MyChart message diabetes educator if 3 or more blood sugars are above the goal in 1 week, if ketones are positive, or with questions/concerns.    Rayne Negrete RD, CDE  Diabetes     Time Spent: 50 minutes  Encounter Type: Individual Telephone    Any diabetes medication dose changes were made via the CDE Protocol and Collaborative Practice Agreement with the patient's OB/GYN provider. A copy of this encounter was shared with the provider.

## 2020-03-31 NOTE — PATIENT INSTRUCTIONS
1. Check blood sugar 4 times a day, before breakfast and 1 hour after the start of each meal.     Blood sugar target before breakfast: 95 or less  Blood sugar target one hour after start of each meal: less than 140    2. Check urine ketones when you wake up every morning for 7 days. If negative everyday, reduce testing to once a week.    3. Follow the recommended meal plan: eat something every 2-3 hours, include protein/fat and carbohydrate at every meal and snack, have 2-3 carb choicess at breakfast, 3-4 carbs at lunch, 3-4 carbs at supper, 1-2 carbs at 3 snacks per day. Each carb choice = 15 gms.     4. Continue walking or being active after each meal to help control blood sugar levels.    5.Call or e-mail educator if 3 or more blood sugars are above goal in 1 week. Call or e-mail with questions or concerns.    6. Call us to let us know what type of meter you would like us to order for you. Please check the expiration of your test strips, and let us know if you would like us to order more.    7. Telephone follow up scheduled Wed, April 8th at 2:30. Please email a picture of your logbook to [email or web address].     Rayne Negrete RD, SHADIA, CDE  Diabetes     [facility] for the [facility]:  For Your Diabetes Education or Nutrition Appointments Call:  [phone]   For Diabetes Education and Nutrition Related Questions:   Phone: [phone]  E-mail: [email or web address]  Fax: [phone]   If you need a medication refill please contact your pharmacy. Please allow 3 business days for your refills to be completed.

## 2020-04-08 ENCOUNTER — PRENATAL OFFICE VISIT (OUTPATIENT)
Dept: OBGYN | Facility: CLINIC | Age: 40
End: 2020-04-08
Attending: OBSTETRICS & GYNECOLOGY

## 2020-04-08 ENCOUNTER — TRANSCRIBE ORDERS (OUTPATIENT)
Dept: MATERNAL FETAL MEDICINE | Facility: CLINIC | Age: 40
End: 2020-04-08

## 2020-04-08 ENCOUNTER — ALLIED HEALTH/NURSE VISIT (OUTPATIENT)
Dept: EDUCATION SERVICES | Facility: CLINIC | Age: 40
End: 2020-04-08

## 2020-04-08 VITALS
WEIGHT: 192 LBS | BODY MASS INDEX: 30.53 KG/M2 | HEART RATE: 68 BPM | SYSTOLIC BLOOD PRESSURE: 140 MMHG | DIASTOLIC BLOOD PRESSURE: 82 MMHG | TEMPERATURE: 97.1 F

## 2020-04-08 DIAGNOSIS — O26.90 PREGNANCY RELATED CONDITION, ANTEPARTUM: Primary | ICD-10-CM

## 2020-04-08 DIAGNOSIS — O09.529 HIGH-RISK PREGNANCY, ELDERLY MULTIGRAVIDA, UNSPECIFIED TRIMESTER: ICD-10-CM

## 2020-04-08 DIAGNOSIS — O24.111 PREGNANCY COMPLICATED BY PRE-EXISTING TYPE 2 DIABETES IN FIRST TRIMESTER: ICD-10-CM

## 2020-04-08 DIAGNOSIS — E11.9 TYPE 2 DIABETES MELLITUS (H): Primary | ICD-10-CM

## 2020-04-08 DIAGNOSIS — O16.1 HYPERTENSION AFFECTING PREGNANCY IN FIRST TRIMESTER: ICD-10-CM

## 2020-04-08 DIAGNOSIS — O09.521 MULTIGRAVIDA OF ADVANCED MATERNAL AGE IN FIRST TRIMESTER: Primary | ICD-10-CM

## 2020-04-08 PROCEDURE — 99203 OFFICE O/P NEW LOW 30 MIN: CPT | Performed by: OBSTETRICS & GYNECOLOGY

## 2020-04-08 PROCEDURE — 98967 PH1 ASSMT&MGMT NQHP 11-20: CPT

## 2020-04-08 RX ORDER — BLOOD-GLUCOSE METER
1 EACH MISCELLANEOUS DAILY
Qty: 1 KIT | Refills: 0 | Status: SHIPPED | OUTPATIENT
Start: 2020-04-08

## 2020-04-08 RX ORDER — LANCETS
EACH MISCELLANEOUS
Qty: 204 EACH | Refills: 6 | Status: SHIPPED | OUTPATIENT
Start: 2020-04-08 | End: 2020-06-25

## 2020-04-08 RX ORDER — BLOOD SUGAR DIAGNOSTIC
STRIP MISCELLANEOUS
Qty: 150 STRIP | Refills: 6 | Status: SHIPPED | OUTPATIENT
Start: 2020-04-08 | End: 2020-06-25

## 2020-04-08 NOTE — Clinical Note
Please abstract the following data from this visit with this patient into the appropriate field in Epic:    Tests that can be patient reported without a hard copy:    Pap smear done on this date: 04/2019 (approximately), by this group: [facility], results were NIL.     Other Tests found in the patient's chart through Chart Review/Care Everywhere:        Note to Abstraction: If this section is blank, no results were found via Chart Review/Care Everywhere.

## 2020-04-08 NOTE — PATIENT INSTRUCTIONS
The best way to prevent an infection is to avoid being exposed to the virus. We recommend that you wash your hands frequently and avoid touching your eyes, nose or mouth.  Practice social distancing by staying home as much as possible, avoiding gatherings and minimize trips for essentials. You should especially avoid any contact with people who are sick. It is possible that people who have the virus have little or no symptoms which is why social distancing is so important to avoid spreading the virus. Clean frequently touched surfaces daily. If you do start to have symptoms such as cough, fever or mild shortness of breath, you should stay home for at least 14 days.  If your symptoms are worrisome or severe or you are scheduled during this time to have a clinic visit you should call us at [phone].    Due to anticipated shortage of blood products we recommend all pregnant women take an iron supplement (ferrous gluconate or ferrous sulfate) in addition to a prenatal vitamin.     The hospital has strict visitor restrictions at this time for your safety. Please be aware that visitor restrictions are subject to change. You are allowed to have one healthy adult visitor during your hospital stay, it must be the same person the entire time and they must stay with you at the hospital the entire time (they are not allowed to come and go).     For information about Covid-19 and pregnancy we look to the center for disease control, the CDC. You can look at this information online at:   https://www.cdc.gov/coronavirus/2019-ncov/hcp/pregnant-women-faq.html

## 2020-04-08 NOTE — PATIENT INSTRUCTIONS
1.  meter and start check ing blood glucose 4 times a day- before breakfast and 1 hour after start of each meal.    FOLLOW UP: Telephone encounter next Wednesday, April 15th at 2pm.    Jenifer Vincent RDN, LD, [facility]   [phone]

## 2020-04-08 NOTE — Clinical Note
Vasquez Puckett,   Here is the Pregnant type 2 I reached out to you about.  Just wondering if a land-line based call may have more success with holding her call than my cell since her phone kept dropping the signal.    She is 13 weeks and still not checking BG today due to no insurance.  Her A1C 8.5% on 3/24.  If you can see if she was able to  her supplies (had emailed the [employer]u-[employer] free meter voucher for the Guide), has started to check (or review importance of controlling her BG at this stage in her pregnancy), and if she is working with Care Coordination (or wants referral to try to help with insurance/costs), that would be fantastic.  Also want to make sure she got the message that next weeks' follow up call is Wed. 4/15 at 2pm, not 2:30.    Thanks so much Lavern!  Jenifer Vincent,  SUHAIL, LD, CDCES

## 2020-04-08 NOTE — Clinical Note
Vasquez Elias,    I kept this pregnant type 2 follow up for 2 hours with you on 4/15 since  is needed and she will probably need insulin.  As of today (4/8), she was not checking BG yet, her A1C was 8.5% on 3/24, she does not have insurance and her phone kept dropping our call and then she stopped answering so could not really discuss much today.  Had requested Lavern reach out to her Thursday/Friday to see if started checking BG and  meter and supplies (sent free Guide meter voucher) so hopefully your call will go better!      Thanks,  Jenifer Vincent,  PLACIDON, LD, CDCES

## 2020-04-08 NOTE — PROGRESS NOTES
OB - New OB History and Physical  Date of visit: 2020  Chief Complaint: To establish prenatal care    HPI: Gloria Escobar is a 39 year old  at 10w3d as dated by US today. Estimated Date of Delivery: 2020.Patient's last menstrual period was 2020.  Menses slightly irregular, every 28-35 days.     Hypertension - taking a medication but not sure the name of it  DM2 - not taking any medication, not checking BG, needs to get a new meter. Hgb A1c 8.5% (3/24/20)    Ultrasound: Impression: Robins intrauterine pregnancy, 10w 3d, with MILEY by today's ultrasound 2020 (consistent with the stated LMP MILEY).  Fetal cardiac activity is seen, 161 BPM.     Obstetric history:     OB History    Para Term  AB Living   4 3 3 0 0 3   SAB TAB Ectopic Multiple Live Births   0 0 0 0 3      # Outcome Date GA Lbr Joao/2nd Weight Sex Delivery Anes PTL Lv   4 Current            3 Term 10/22/09 39w0d  3.629 kg (8 lb) F    BREANNE   2 Term 07 39w0d  3.629 kg (8 lb) F   N BREANNE   1 Term 04 39w0d  3.629 kg (8 lb) F   N BREANNE       Gynecologic History:   Menstrual Interval: irregular, every 25-35d  Patient's last menstrual period was 2020.   STI history: none  Last Pap: 19 NIL  History of abnormal pap: no    Allergy: Patient has no known allergies.  Patient denies food, latex or environmental allergies.     Current Medications:  Current Outpatient Medications   Medication     acetone urine (KETOSTIX) test strip     NIFEdipine ER OSMOTIC (PROCARDIA XL) 30 MG 24 hr tablet     No current facility-administered medications for this visit.        Past Medical History:  Past Medical History:   Diagnosis Date     Hypertension      Type 2 diabetes mellitus (H) .       Past Surgical History:  Past Surgical History:   Procedure Laterality Date     cholesetomy         Social History:  Patient lives in [place] with  and 3 daughters.  Patient's relationship status  is: .    Not currently working.   Denies current tobacco, alcohol or recreational drug use.    She feels safe in her relationship. Patient denies history of sexual, physical or mental abuse.     Family History:  Family History   Problem Relation Age of Onset     Diabetes Mother         type 2      Thyroid Cancer Mother      Diabetes Father         type 2       Review of Systems  Gen:  no change in weight, no fever, no chills, no fatigue  CV: no palpitations, no chest pain, no hypertension, no syncope  Resp: no shortness of breath, no cough, no wheezing, no asthma  GI: no nausea, no vomiting, no diarrhea, no constipation, no bloating, no GERD  :  no vaginal discharge, no dysuria, no abnormal bleeding, no pelvic pain   Endo: no thyroid problems, no cold/heat intolerance, no acne, no hirsutism, no diabetes  Heme: no easy bruising or bleeding, no history of DVT/PE/CVA  Neuro: no headaches, no seizures, no strokes, no focal deficits      Physical Exam:  Vitals:    04/08/20 1356   BP: (!) 140/82   Pulse: 68   Temp: 97.1  F (36.2  C)   TempSrc: Oral   Weight: 87.1 kg (192 lb)     Body mass index is 30.53 kg/m .  Gen: alert, oriented, no distress,  pleasant, appears stated age, casually groomed  Neck: supple, trachea midline, no thyromegaly, no lymphadenopathy  HEENT: head normocephalic, atraumatic, normal oropharynx without erythema or exudates  CV: normal heart sounds, regular rate and rhythm, no murmurs  Resp: good inspiratory effort, lungs clear to ascultation bilaterally, no wheezes or rhonchi  Abd: soft, obese, nontender  : normal external genitalia with lesions or erythema; normal, well supported urethra, normal Bartholins, normal Skenes; normal pink rugated vaginal mucosa, no lesions or abnormal discharge.  Bimanual exam shows 10week sized uterus, mobile, no fundal tenderness, no CMT, no adnexal masses or tenderness. Cervix long and closed  Extr: warm, well perfused, nontender, no edema  Psych: affect  bright, cooperative, responds appropriately      Assessment:  Gloria Escobar is a 39 year old  at 10w3d presenting to establish prenatal care.    Problem List:   Hypertension   Diabetes type 2   Advanced maternal age    Plan:  1. Irregular menses. Plan to use US dating.   2. Chronic htn, no meds. Baseline UPC <0.05, HELLP labs wnl  3. DM2: poorly controlled. Endocrinology consult. Hgb A1c 8.5%. discussed risks of DM in pregnancy, encouraged her to start checking BG. Discussed likely need for insulin.   4. Reviewed routine prenatal care. Discussed MD call schedule as well as role of residents and med students both in clinic and hospital.  She is okay  with resident care  5. Pap: up to date   6. Diet, Nutrition and Exercise:  Continue PNVs. Continue normal exercise. Her prepregnancy BMI is 30.  According to the WHO guidelines, patient is given a goal of gaining approximately 11-20 pounds during the course of her pregnancy.    7. Immunizations: plan TdaP at 28 weeks  8. Fetal anomaly screening: interested in NIPT, ordered. Plan level 2 US for AMA, DM2 and chronic htn.   9. Routine Prenatal Care: the patient will return to clinic in 4 weeks and prn      Due to language barrier, a phone  was used during the history-taking, exam and subsequent discussion with this patient.     Geraldine Ferro MD

## 2020-04-08 NOTE — PROGRESS NOTES
Diabetes Self-Management Education & Support  Follow-up Gestational Diabetes Self-Management Education & Support      Patient verbally consented to the telephone visit service today: yes    SUBJECTIVE/OBJECTIVE:  Presents for education related to gestational diabetes.    Telephone visit with both: , Sam(Poornima - #[id number])  Diabetes management related comments/concerns: None- not start checking BG since not have meter and not working.  She is not checking BG right now but is trying to take care of herself.  Trying to eat better.    Says has strips Accu-Guide strips and lancets but needs a new meter.  Unsure if strips  or not.       Cultural Influences/Ethnic Background:  British     LMP 2020     Weight loss 2 lbs in the past 2 weeks per OB weights in chart at 10 weeks gestation.    Estimated Date of Delivery: 2020    Blood Glucose/Ketone Log:  NOT AVAILABLE      Lifestyle and Health Behaviors:  Pre-pregnancy weight (lbs): (not sure)  Exercise:: Yes  Days per week of moderate to strenuous exercise (like a brisk walk): 5  On average, minutes per day of exercise at this level: 30  How intense was your typical exercise? : Light (like stretching or slow walking)  Exercise Minutes per Week: 150  Barrier to exercise: None  Meals include: Breakfast, Lunch, Dinner  Beverages: Water, Tea(stopped juice and soda)  Cultural/Rastafarian diet restrictions?: No  Pre- vitamin?: Yes  Supplements?: No  Experiencing nausea?: No    Diet recall:   B: 2 eggs and toast  L: salad with veggies and shrimp  D: Lost patient again and she did not  after another 3 calls.     Healthy Coping:  Emotional response to diabetes: Ready to learn, Concern for health and well-being  Informal Support system:: Family  Stage of change: ACTION (Actively working towards change)    Current Management:  Taking medications for gestational diabetes?: No.  Metformin in med list but not taking on - she stopped Metformin when  she found out she was pregnant and was going to check with OB about it.   Difficulty affording diabetes management supplies?: Yes(Does not have insurance right now)    ASSESSMENT:  Ketones: unable to review.   Fasting blood glucoses: na% in target.  After breakfast: na% in target.  After lunch: na% in target.  After dinner: na% in target.    Could not provide much education today as patient's phone kept losing the signal and then patient stopped picking up.      Gloria says she is trying to take care of herself.  Has not started checking blood glucose since worried about expense- currently does not have insurance.  Does have meter at home but says it is not working.  She did have her meter today and verified it was the Accu-chek Guide.     Discussed cost of Guide strips and patient feels this was doable; discussed how she can get 50 strips at a time or more if wants to delay needing to go back to pharmacy. She was asked to check if test strips at home were , in which she was told to throw them away if they were.  To be on safe side, will send prescription for strips, lancets and new meter and she can get what she needs.  Told her refills will be at pharmacy so can call if getting low.  Since she does not currently have insurance, will also email voucher for a free Guide Me meter (uses guide strips) and was able to verify email address. Can wait for her to check ketones until she has insurance.       Reviewed when to check blood glucose, target range both for fasting and post-meal blood glucose and patient encouraged to call if seeing high blood glucose.  She verbalized understanding.     Started to obtain diet recall but then when call cut out during meal recall, patient did not  after 3 tries. Will also see if triage CDE can call her tomorrow to see if she picked up her meter and stress importance of getting her blood glucose under control for health of her baby since likely has elevated blood glucose  already with her A1C at 8.5% on 3/24/20. Was also unable to assess if her OB instructed her to take Metformin again since she had stopped taking it when she found out she was pregnant.  Left her a message that unless she calls back, will plan to follow up with her next Wednesday, 4/15/20 at 2pm.      Will also request Endo referral for patient since she will likely need insulin during pregnancy to manage blood glucose. She may also benefit from Care Coordination referral to help with insurance if not already working with someone but this was not discussed today before the call was over.    INTERVENTION:  Educational topics covered today:  Checking Blood glucose, Target Range, When to Call a Diabetes Educator or OB Provider    Educational Materials provided today:  Emailed voucher for free Accu-chek Guide Me meter    PLAN:  Check glucose 4 times daily.  Check ketones daily once has insurance/able to afford.  Continue with recommended physical activity.  Continue to follow recommended meal plan: 2 carbs at breakfast, 3-4 carbs at lunch, 3-4 carbs at supper, 1-2 carbs at snacks.  Follow consistent CHO meal plan, eat CHO and protein/fat at all meals/snacks.    Call/e-mail/Inkvitehart message diabetes educator if 3 or more blood sugars are above the goal in 1 week or if ketones are positive.    Since instructions not changed from last visit, did not resend, only email with follow up appointment and voucher.    Jenifer Vincent RDN, LD, [facility]   Time Spent: 20 minutes   Encounter Type: Individual TELEPHONE visit    Any diabetes medication dose changes were made via the CDE Protocol and Collaborative Practice Agreement with the patient's referring provider. A copy of this encounter was shared with the provider.

## 2020-04-08 NOTE — Clinical Note
Hi!   I saw both of you saw Gloria.  Based on Gloria's high A1C, she would benefit from establishing with endocrinology since she will likely need insulin during this pregnancy.  Unsure of her blood glucose control since she has not started checking blood glucose. Emailed her a voucher for new Guide Me meter but unsure if her strips are still good.  She also does not have insurance. Was not sure if you checked but she may also benefit from a Care Coordination referral to try to hep her if needed for insurance and possibly other resources since she is not working.    I was hoping to check with her but her phone kept dropping the call and then she stopped answering before this could be discussed.    Thanks,  Jenifer Vincent,  PLACIDON, LD, CDCES

## 2020-04-09 ENCOUNTER — TELEPHONE (OUTPATIENT)
Dept: EDUCATION SERVICES | Facility: CLINIC | Age: 40
End: 2020-04-09

## 2020-04-09 ENCOUNTER — TELEPHONE (OUTPATIENT)
Dept: OBGYN | Facility: CLINIC | Age: 40
End: 2020-04-09

## 2020-04-09 DIAGNOSIS — O24.111 PREGNANCY COMPLICATED BY PRE-EXISTING TYPE 2 DIABETES IN FIRST TRIMESTER: Primary | ICD-10-CM

## 2020-04-09 NOTE — TELEPHONE ENCOUNTER
TC from diabetic educator.  Pt needs referral for endocrinology and care coordination r/t diabetes, insulin, no insurance.  Referrals done.  Shira Lakhani RN

## 2020-04-09 NOTE — TELEPHONE ENCOUNTER
I received a call back from Riya at [employer] assistance program.  She does not qualify for the free 500.00 gift card, as she is not a [facility] patient.  But she can qualify using [employer] for help and [facility] does use that program.  She also could get financial help for Corimmun by calling [phone].  Once she is placed on insulin, Riya said they could apply for her to get on a manufacturers assistance program, such as Novonordisk.      Lavern Rosas RN/CAYLA  [place] Diabetes Educator

## 2020-04-10 ENCOUNTER — TELEPHONE (OUTPATIENT)
Dept: ENDOCRINOLOGY | Facility: CLINIC | Age: 40
End: 2020-04-10

## 2020-04-10 NOTE — TELEPHONE ENCOUNTER
Referring providers name and location: [facility]     Reason for visit: Pregnancy complicated by pre-existing type 2 diabetes in first trimester    Medical records or notes if possible: Epic

## 2020-04-15 ENCOUNTER — ALLIED HEALTH/NURSE VISIT (OUTPATIENT)
Dept: EDUCATION SERVICES | Facility: CLINIC | Age: 40
End: 2020-04-15

## 2020-04-15 DIAGNOSIS — Z53.9 NO SHOW: Primary | ICD-10-CM

## 2020-04-15 NOTE — PROGRESS NOTES
Called out to patient during planned telephone visit.  Patient did not answer.  Left voicemail.  E-mail sent to address:    Vasquez Castro,    We were scheduled to have a telephone visit today to follow up on your blood sugars.  Were you able to  a meter?  If so I would like to review the blood sugars with you.  Please call me at [phone] OR call   [phone].    Please call to schedule an Endocrinology appointment [phone]  Blairsaul Csatro,     Estábamos programados para bandar visita telefónica hoy para hacer un seguimiento de cristela niveles de azúcar en la lashanda.  Pudiste levantar un medidor? Si es así, me gustaría revisar el azúcar en la lashanda con usted. Por favor llámeme al [phone] O llame al  intérprete [phone].     Llame para programar bandar jana de endocrinología al [phone]    Thanks!      Jada Gunn, MS, RD, LD, CDE  Diabetes

## 2020-04-17 NOTE — TELEPHONE ENCOUNTER
RECORDS RECEIVED FROM: Internal   DATE RECEIVED: 4/27/20   NOTES (FOR ALL VISITS) STATUS DETAILS   OFFICE NOTES from referring provider Internal Dr Geraldine Lujan @ [facility]:  4/9/20 encounter   OFFICE NOTES from other specialist Internal Jenifer Vincent (dietician) @ [facility]:  4/8/20    Rayne Negrete (dietician) @ [facility]:  3/31/20   ED NOTES N/A    OPERATIVE REPORT  (thyroid, pituitary, adrenal, parathyroid) N/A    MEDICATION LIST Internal    IMAGING      DEXASCAN N/A    MRI (BRAIN) N/A    XR (Chest) Internal [facility]:  XR Chest 4/24/18   CT (HEAD/NECK/CHEST/ABDOMEN) N/A    NUCLEAR  N/A    ULTRASOUND (HEAD/NECK) N/A    LABS     DIABETES: HBGA1C, CREATININE, FASTING LIPIDS, MICROALBUMIN URINE, POTASSIUM, TSH, T4    THYROID: TSH, T4, CBC, THYRODLONULIN, TOTAL T3, FREE T4, CALCITONIN, CEA Internal   3/24/20

## 2020-04-20 ENCOUNTER — DOCUMENTATION ONLY (OUTPATIENT)
Dept: CARE COORDINATION | Facility: CLINIC | Age: 40
End: 2020-04-20

## 2020-04-24 NOTE — PROGRESS NOTES
"dm 2  Lamine Kelly, [facility]    Gloria Escobar is a 39 year old female who is being evaluated via a billable video visit.      The patient has been notified of following:     \"This video visit will be conducted via a call between you and your physician/provider. We have found that certain health care needs can be provided without the need for an in-person physical exam.  This service lets us provide the care you need with a video conversation.  If a prescription is necessary we can send it directly to your pharmacy.  If lab work is needed we can place an order for that and you can then stop by our lab to have the test done at a later time.    Video visits are billed at different rates depending on your insurance coverage.  Please reach out to your insurance provider with any questions.    If during the course of the call the physician/provider feels a video visit is not appropriate, you will not be charged for this service.\"    Patient has given verbal consent for Video visit? Yes    How would you like to obtain your AVS? [facility]    Patient would like the video invitation sent by: text    Will anyone else be joining your video visit? interperter        Video-Visit Details    Type of service:  Video Visit    Start: 04/27/2020 03:06 pm   Stop: 04/27/2020 03:43 pm     Originating Location (pt. Location): Home    Distant Location (provider location):  [facility] ENDOCRINOLOGY     Mode of Communication:  Video Conference via [facility]                                                                                 - Endocrinology Initial Consultation -    Reason for visit/consult:     Type 2 diabetes mellitus with hyperglycemia, with long-term current use of insulin (H)  Insulin controlled gestational diabetes mellitus (GDM) in first trimester    Primary care provider: Roland, [facility]    HPI: A 38 yo female here for the evaluation for her DM during the pregnancy.   She has DM2 and currently 13 " weeks of pregnancy.   Patient came from [place] living in [place] for 17 years  joined for this video session.  This is her first pregnancy last pregnancy was 12 years ago.  She never diagnosed gestational diabetes for previous 3 pregnancies.  However 1 and half year ago she started to have numbness of her right hand and found out her blood pressure was elevated and she has diabetes she recalls glucose was more than 300.  And she was started on metformin however because of pregnancy she stopped metformin currently.  She is taking few times a day for glucose and mentioned that this morning her glucose morning was 130.  Also mentioned postprandial sometimes 160, 175, 180.    She has a strong family history of diabetes both mother and father has diabetes.  Mother also has thyroid cancer.  Her most recent A1c 8.5 in March 24, 2020.  She has never used insulin.        Past Medical/Surgical History:  Past Medical History:   Diagnosis Date     Hypertension      Type 2 diabetes mellitus (H) .     Past Surgical History:   Procedure Laterality Date     cholesetomy         Allergies:  No Known Allergies    Current Medications   Current Outpatient Medications   Medication     insulin glargine (LANTUS SOLOSTAR) 100 UNIT/ML pen     NOVOLOG FLEXPEN 100 UNIT/ML soln     acetone urine (KETOSTIX) test strip     blood glucose (ACCU-CHEK GUIDE) test strip     blood glucose monitoring (ACCU-CHEK FASTCLIX) lancets     Blood Glucose Monitoring Suppl (ACCU-CHEK GUIDE ME) w/Device KIT     lisinopril (ZESTRIL) 20 MG tablet     metFORMIN (GLUCOPHAGE-XR) 500 MG 24 hr tablet     NIFEdipine ER OSMOTIC (PROCARDIA XL) 30 MG 24 hr tablet     No current facility-administered medications for this visit.        Family History:  Family History   Problem Relation Age of Onset     Diabetes Mother         type 2      Thyroid Cancer Mother      Diabetes Father         type 2       Social History:  Social History     Tobacco Use      Smoking status: Never Smoker     Smokeless tobacco: Never Used   Substance Use Topics     Alcohol use: No   lives with daughter and , Job:   From [place] 17 years ago    ROS:  Full review of systems taken with the help of the intake sheet. Otherwise a complete 14 point review of systems was taken and is negative unless stated in the history above.        Physical Exam:   Vitals: LMP 01/20/2020   BMI= There is no height or weight on file to calculate BMI.   General: well appearing, no acute distress, pleasant and conversant,   Mental Status/neuro: alert and oriented  Face: symmetrical, normal facial color  Eyes: anicteric, PERRL, no proptosis or lid lag  Reps: non acute distress      Labs : I reviewed data from epic and extract and summarize the pertinent data here.   Lab Results   Component Value Date     04/24/2018      Lab Results   Component Value Date    POTASSIUM 4.0 04/24/2018     Lab Results   Component Value Date    CHLORIDE 103 04/24/2018     Lab Results   Component Value Date    SWATHI 8.7 04/24/2018     Lab Results   Component Value Date    CO2 24 04/24/2018     Lab Results   Component Value Date    BUN 14 04/24/2018     Lab Results   Component Value Date    CR 0.52 03/24/2020     Lab Results   Component Value Date     04/24/2018     Lab Results   Component Value Date    TSH 1.25 03/24/2020     No results found for: T4  Lab Results   Component Value Date    A1C 8.5 03/24/2020           Assessment and Plan  39 year old female with type 2 diabetes A1c 8.5 currently 13 weeks of pregnancy    -Agree with hold metformin for now    -We will start insulin both long-acting and short-acting.    Lantus 16 units every morning    NovoLog 4 units each meal    Encourage patient to check glucose 4 times a day before meal and 1 hour post meals    Patient is currently working on her insurance, if she cannot afford her insulin she will contact us and I gave her number our clinic.     Insulin pen  teaching was done by video today.      Return to clinic with me in 2 weeks.      Elaine Hughes MD  Staff Physician  Endocrinology and Metabolism  License: [id number]

## 2020-04-27 ENCOUNTER — APPOINTMENT (OUTPATIENT)
Dept: INTERPRETER SERVICES | Facility: CLINIC | Age: 40
End: 2020-04-27

## 2020-04-27 ENCOUNTER — VIRTUAL VISIT (OUTPATIENT)
Dept: ENDOCRINOLOGY | Facility: CLINIC | Age: 40
End: 2020-04-27

## 2020-04-27 ENCOUNTER — PRE VISIT (OUTPATIENT)
Dept: ENDOCRINOLOGY | Facility: CLINIC | Age: 40
End: 2020-04-27

## 2020-04-27 DIAGNOSIS — Z83.3 FAMILY HISTORY OF DIABETES MELLITUS: ICD-10-CM

## 2020-04-27 DIAGNOSIS — O24.414 INSULIN CONTROLLED GESTATIONAL DIABETES MELLITUS (GDM) IN FIRST TRIMESTER: ICD-10-CM

## 2020-04-27 DIAGNOSIS — E11.65 TYPE 2 DIABETES MELLITUS WITH HYPERGLYCEMIA, WITH LONG-TERM CURRENT USE OF INSULIN (H): Primary | ICD-10-CM

## 2020-04-27 DIAGNOSIS — I10 ESSENTIAL HYPERTENSION: ICD-10-CM

## 2020-04-27 DIAGNOSIS — Z79.4 TYPE 2 DIABETES MELLITUS WITH HYPERGLYCEMIA, WITH LONG-TERM CURRENT USE OF INSULIN (H): Primary | ICD-10-CM

## 2020-04-27 RX ORDER — INSULIN ASPART 100 [IU]/ML
4 INJECTION, SOLUTION INTRAVENOUS; SUBCUTANEOUS
Qty: 15 ML | Refills: 3 | Status: SHIPPED | OUTPATIENT
Start: 2020-04-27 | End: 2020-07-21

## 2020-05-04 ENCOUNTER — APPOINTMENT (OUTPATIENT)
Dept: INTERPRETER SERVICES | Facility: CLINIC | Age: 40
End: 2020-05-04
Payer: MEDICAID

## 2020-05-05 ENCOUNTER — PRENATAL OFFICE VISIT (OUTPATIENT)
Dept: OBGYN | Facility: CLINIC | Age: 40
End: 2020-05-05

## 2020-05-05 ENCOUNTER — TRANSCRIBE ORDERS (OUTPATIENT)
Dept: MATERNAL FETAL MEDICINE | Facility: CLINIC | Age: 40
End: 2020-05-05

## 2020-05-05 VITALS
HEART RATE: 54 BPM | TEMPERATURE: 97.9 F | WEIGHT: 193.2 LBS | BODY MASS INDEX: 30.32 KG/M2 | HEIGHT: 67 IN | SYSTOLIC BLOOD PRESSURE: 120 MMHG | DIASTOLIC BLOOD PRESSURE: 69 MMHG

## 2020-05-05 DIAGNOSIS — O09.522 MULTIGRAVIDA OF ADVANCED MATERNAL AGE IN SECOND TRIMESTER: Primary | ICD-10-CM

## 2020-05-05 DIAGNOSIS — O26.90 PREGNANCY RELATED CONDITION, ANTEPARTUM: Primary | ICD-10-CM

## 2020-05-05 DIAGNOSIS — O24.112 PREGNANCY COMPLICATED BY PRE-EXISTING TYPE 2 DIABETES IN SECOND TRIMESTER: ICD-10-CM

## 2020-05-05 PROCEDURE — 99212 OFFICE O/P EST SF 10 MIN: CPT | Performed by: OBSTETRICS & GYNECOLOGY

## 2020-05-05 ASSESSMENT — MIFFLIN-ST. JEOR: SCORE: 1576.04

## 2020-05-05 ASSESSMENT — PATIENT HEALTH QUESTIONNAIRE - PHQ9: SUM OF ALL RESPONSES TO PHQ QUESTIONS 1-9: 0

## 2020-05-05 NOTE — PROGRESS NOTES
Doing ok.   Insurance hasn't started so hasn't started insulin yet. Her card is supposed to come this week in the mail. Will call us if she hasn't received it by Friday.   Fastings 110-120. After meals, 150-155. Didn't bring her book with her.   Comprehensive fetal survey ordered for 19 weeks. Asked patient to schedule in our office the same day after that gets scheduled.

## 2020-05-15 ENCOUNTER — APPOINTMENT (OUTPATIENT)
Dept: INTERPRETER SERVICES | Facility: CLINIC | Age: 40
End: 2020-05-15
Payer: MEDICAID

## 2020-06-09 ENCOUNTER — APPOINTMENT (OUTPATIENT)
Dept: INTERPRETER SERVICES | Facility: CLINIC | Age: 40
End: 2020-06-09
Payer: COMMERCIAL

## 2020-06-11 ENCOUNTER — APPOINTMENT (OUTPATIENT)
Dept: INTERPRETER SERVICES | Facility: CLINIC | Age: 40
End: 2020-06-11
Payer: COMMERCIAL

## 2020-06-11 ENCOUNTER — VIRTUAL VISIT (OUTPATIENT)
Dept: MATERNAL FETAL MEDICINE | Facility: CLINIC | Age: 40
End: 2020-06-11
Attending: OBSTETRICS & GYNECOLOGY
Payer: COMMERCIAL

## 2020-06-11 DIAGNOSIS — O26.90 PREGNANCY RELATED CONDITION, ANTEPARTUM: ICD-10-CM

## 2020-06-11 DIAGNOSIS — O09.522 MULTIGRAVIDA OF ADVANCED MATERNAL AGE IN SECOND TRIMESTER: Primary | ICD-10-CM

## 2020-06-11 PROCEDURE — 96040 ZZH GENETIC COUNSELING, EACH 30 MINUTES: CPT | Mod: TEL,ZF | Performed by: GENETIC COUNSELOR, MS

## 2020-06-11 NOTE — PROGRESS NOTES
[facility] [place]  Genetic Counseling Consult    Patient:  Gloria Escobar YOB: 1980   Date of Service:  20      Gloria was evaluated via a billable telephone visit at [facility] for genetic consultation given advanced maternal age). Beth, a , assisted with the visit over the phone.    The patient has been notified of the following:  This telephone visit will be conducted via a call between you and your physician/provider. We have found that certain health care needs can be provided without the need for a physical exam. This service lets us provide the care you need with a short phone conversation. If a prescription is necessary we can send it directly to your pharmacy. If lab work is needed we can place an order for that and you can then stop by our lab to have the test done at a later time.     If during the course of the call the provider feels a telephone visit is not appropriate, you will not be charged for this service.        Impression/Plan:   1. Gloria has not had serum screening in this pregnancy. Nazia elected to proceed with NIPT after our discussion today. She wants to have this drawn after her comprehensive ultrasound on . After her blood is drawn, results will take 7-10 days to return, and will be available in [facility] [employer]. We will contact her to discuss the results, and a copy will be forwarded to the office of the referring OB provider. Nazia requested that I leave a detailed message with her NIPT results if she does not answer the phone. Nazia plans on learning fetal sex at her comprehensive ultrasound.    2. Gloria has a comprehensive (level II) ultrasound scheduled for  at [facility] clinic.  Please see the ultrasound report for further details.    Pregnancy History:   /Parity:    Age at Delivery: 40 year old  MILEY: 2020, by Ultrasound  Gestational  "Age: 19w4d    No significant complications or exposures were reported in the current pregnancy.    Solange pregnancy history is significant for:  o 39w0d , female   o 39w0d , female   o 39w0d , female     Medical History:   Gloria has type 2 diabetes aand chronic hypertension. She has been on insulin and aspirin for the past 5-6 weeks of the pregnancy.     We discussed that the average pregnancy has a 3-5% chance of having a baby with a birth defect. Maternal diabetes increases that chance to 6-10% and possibly up to 20% if it is poorly controlled in the first trimester. These birth defects can include spinal cord defects (spina bifida), heart defects, skeletal defects, and defects in the urinary, reproductive, and digestive systems. Diabetes in the pregnancy can also lead to complications such as pre-eclampsia, polyhydramnios, and  delivery.        Family History:   A three-generation pedigree was obtained, and is scanned under the  Media  tab.   The following significant findings were reported by Gloria:    Gloria's , Haim, is 42 and healthy. He is the father of all of Gloria's pregnancies.    Haim's brother is healthy but reportedly \"sterile\". Further details regarding this specific history were not known during our conversation today.    Otherwise, the reported family history is negative for multiple miscarriages, stillbirths, birth defects, cognitive impairment, known genetic conditions, and consanguinity.       Carrier Screening:   The patient reports that she and the father of the pregnancy have  ancestry:      Sickle Cell Anemia is an autosomal recessive genetic condition that occurs with increased frequency in individuals of  ancestry and carrier screening for this condition is available.  In addition,  screening in the [facility] includes Sickle Cell Anemia.      Expanded carrier screening for mutations in a large panel of genes associated with " autosomal recessive conditions including cystic fibrosis, spinal muscular atrophy, and others, is now available.      Carrier screening was not discussed today.       Risk Assessment for Chromosome Conditions:   We explained that the risk for fetal chromosome abnormalities increases with maternal age. We discussed specific features of common chromosome abnormalities, including Down syndrome, trisomy 13, trisomy 18, and sex chromosome trisomies.      - At age 40 at delivery, the risk to have a baby with Down syndrome is 1 in 106.     - At age 40 at delivery, the risk to have a baby with any chromosome abnormality is 1 in 65.       Gloria did not have maternal serum screening earlier in pregnancy.       Testing Options:   We discussed the following options:   Non-invasive Prenatal Testing (NIPT)    Maternal plasma cell-free DNA testing; first trimester ultrasound with nuchal translucency and nasal bone assessment is recommended, when appropriate    Screens for fetal trisomy 21, trisomy 13, trisomy 18, and sex chromosome aneuploidy    Cannot screen for open neural tube defects; maternal serum AFP after 15 weeks is recommended       Comprehensive (Level II) ultrasound: Detailed ultrasound performed between 18-22 weeks gestation to screen for major birth defects and markers for aneuploidy.      We reviewed the benefits and limitations of this testing.  Screening tests provide a risk assessment specific to the pregnancy for certain fetal chromosome abnormalities, but cannot definitively diagnose or exclude a fetal chromosome abnormality.  Follow-up genetic counseling and consideration of diagnostic testing is recommended with any abnormal screening result.     Diagnostic tests carry inherent risks- including risk of miscarriage- that require careful consideration.  These tests can detect fetal chromosome abnormalities with greater than 99% certainty.  Results can be compromised by maternal cell contamination or mosaicism,  and are limited by the resolution of cytogenetic G-banding technology.  There is no screening nor diagnostic test that can detect all forms of birth defects or mental disability.    It was a pleasure to be involved with Solange care.    Phone Call Contact Time  Call Started at 8:15am  Call Ended at 9:110am      Yulisa Marcus MS, [facility]  Genetic Counselor  Maternal Fetal Medicine  [facility]   Phone: [phone]  Pager: [phone]  Email: [email or web address]

## 2020-06-15 ENCOUNTER — PRE VISIT (OUTPATIENT)
Dept: MATERNAL FETAL MEDICINE | Facility: CLINIC | Age: 40
End: 2020-06-15

## 2020-06-17 ENCOUNTER — HOSPITAL ENCOUNTER (OUTPATIENT)
Dept: ULTRASOUND IMAGING | Facility: CLINIC | Age: 40
End: 2020-06-17
Attending: OBSTETRICS & GYNECOLOGY
Payer: COMMERCIAL

## 2020-06-17 ENCOUNTER — OFFICE VISIT (OUTPATIENT)
Dept: MATERNAL FETAL MEDICINE | Facility: CLINIC | Age: 40
End: 2020-06-17
Attending: OBSTETRICS & GYNECOLOGY
Payer: COMMERCIAL

## 2020-06-17 DIAGNOSIS — O26.90 PREGNANCY RELATED CONDITION, ANTEPARTUM: ICD-10-CM

## 2020-06-17 DIAGNOSIS — O16.2 HYPERTENSION AFFECTING PREGNANCY IN SECOND TRIMESTER: ICD-10-CM

## 2020-06-17 DIAGNOSIS — O24.112 PREGNANCY COMPLICATED BY PRE-EXISTING TYPE 2 DIABETES IN SECOND TRIMESTER: ICD-10-CM

## 2020-06-17 DIAGNOSIS — O09.522 MULTIGRAVIDA OF ADVANCED MATERNAL AGE IN SECOND TRIMESTER: Primary | ICD-10-CM

## 2020-06-17 PROCEDURE — 40000791 ZZHCL STATISTIC VERIFI PRENATAL TRISOMY 21,18,13: Performed by: OBSTETRICS & GYNECOLOGY

## 2020-06-17 PROCEDURE — 36415 COLL VENOUS BLD VENIPUNCTURE: CPT | Performed by: OBSTETRICS & GYNECOLOGY

## 2020-06-17 PROCEDURE — 76811 OB US DETAILED SNGL FETUS: CPT

## 2020-06-17 NOTE — PROGRESS NOTES
"Please see \"Imaging\" tab under \"Chart Review\" for details of today's US at the [facility].    Herman Bradley MD  Maternal-Fetal Medicine      "

## 2020-06-22 ENCOUNTER — TELEPHONE (OUTPATIENT)
Dept: MATERNAL FETAL MEDICINE | Facility: CLINIC | Age: 40
End: 2020-06-22

## 2020-06-22 LAB — LAB SCANNED RESULT: NORMAL

## 2020-06-22 NOTE — TELEPHONE ENCOUNTER
Called and discussed normal NIPT results with Gloria. Results indicate NO ANEUPLOIDY DETECTED for chromosomes 21, 18, 13, or sex chromosomes (XX). This puts her current pregnancy at low risk for Down syndrome, trisomy 18, trisomy 13 and sex chromosome abnormalities. This test is reported to have the following sensitivities: Down syndrome- 99%, trisomy 18- 98%, and trisomy 13- 98%. Although these results are reassuring, this does not replace a standard chromosome analysis from a chorionic villus sampling or amniocentesis.  Her results are available in her Epic chart for her primary OB to review.    Christen Wellington MS, [facility]  Maternal Fetal Medicine  [facility]  Ph: [phone]  [email or web address]

## 2020-06-23 ENCOUNTER — APPOINTMENT (OUTPATIENT)
Dept: INTERPRETER SERVICES | Facility: CLINIC | Age: 40
End: 2020-06-23
Payer: COMMERCIAL

## 2020-06-23 ENCOUNTER — TELEPHONE (OUTPATIENT)
Dept: OBGYN | Facility: CLINIC | Age: 40
End: 2020-06-23

## 2020-06-23 NOTE — TELEPHONE ENCOUNTER
Pt return call. Pt states she is not available until Thursday 6/25. Was able to help her set up appt on 6/25 at 2pm, double booked into Dr. Ferro schedule.   will help pt call downstairs to MFM to schedule ultrasound.

## 2020-06-23 NOTE — TELEPHONE ENCOUNTER
----- Message from Es Hsu RN sent at 2020 10:27 AM CDT -----  Regarding: FW: needs prenatal appt  Please schedule prenatal appt ASAP.   ----- Message -----  From: Geraldine Ferro MD  Sent: 2020  10:23 AM CDT  To: Rd Triage Pod B  Subject: needs prenatal appt                              Gloria Escobar is a 40 year old  at 20w5d. She has not been seen in our clinic since 14 weeks. Please call her to schedule a prenatal appt in person asap. She is already diabetic so does not need gct.   Thanks  Geraldine Ferro MD

## 2020-06-24 ENCOUNTER — APPOINTMENT (OUTPATIENT)
Dept: INTERPRETER SERVICES | Facility: CLINIC | Age: 40
End: 2020-06-24
Payer: COMMERCIAL

## 2020-06-25 ENCOUNTER — PRENATAL OFFICE VISIT (OUTPATIENT)
Dept: OBGYN | Facility: CLINIC | Age: 40
End: 2020-06-25
Payer: COMMERCIAL

## 2020-06-25 VITALS
HEART RATE: 68 BPM | BODY MASS INDEX: 30.68 KG/M2 | DIASTOLIC BLOOD PRESSURE: 73 MMHG | SYSTOLIC BLOOD PRESSURE: 121 MMHG | TEMPERATURE: 97.5 F | WEIGHT: 193 LBS

## 2020-06-25 DIAGNOSIS — O09.522 MULTIGRAVIDA OF ADVANCED MATERNAL AGE IN SECOND TRIMESTER: Primary | ICD-10-CM

## 2020-06-25 DIAGNOSIS — Z79.4 TYPE 2 DIABETES MELLITUS WITHOUT COMPLICATION, WITH LONG-TERM CURRENT USE OF INSULIN (H): ICD-10-CM

## 2020-06-25 DIAGNOSIS — O24.112 PREGNANCY COMPLICATED BY PRE-EXISTING TYPE 2 DIABETES IN SECOND TRIMESTER: ICD-10-CM

## 2020-06-25 DIAGNOSIS — E11.9 TYPE 2 DIABETES MELLITUS WITHOUT COMPLICATION, WITH LONG-TERM CURRENT USE OF INSULIN (H): ICD-10-CM

## 2020-06-25 PROCEDURE — 99207 ZZC PRENATAL VISIT: CPT | Performed by: OBSTETRICS & GYNECOLOGY

## 2020-06-25 RX ORDER — BLOOD SUGAR DIAGNOSTIC
STRIP MISCELLANEOUS
Qty: 150 STRIP | Refills: 6 | Status: SHIPPED | OUTPATIENT
Start: 2020-06-25 | End: 2020-07-21

## 2020-06-25 RX ORDER — PNV NO.95/FERROUS FUM/FOLIC AC 28MG-0.8MG
1 TABLET ORAL DAILY
Qty: 100 TABLET | Refills: 3 | Status: ON HOLD | OUTPATIENT
Start: 2020-06-25 | End: 2020-10-25

## 2020-06-25 RX ORDER — LANCETS
EACH MISCELLANEOUS
Qty: 204 EACH | Refills: 6 | Status: SHIPPED | OUTPATIENT
Start: 2020-06-25 | End: 2020-07-21

## 2020-06-25 NOTE — PROGRESS NOTES
21w4d  Taking insulin novolog 4u TID and glargine 16u in the morning.  BG fasting 80s-90s  BG 1h gtt 120-130s  Taking nifedipine for chronic htn.  Fetal survey 6/17/2020 normal growth and anatomy, anterior placenta, no previa, 3vc  NIPT wnl (XX).   Fetal echo scheduled 7/7  Plan monthly growth US  Active fetal movement. No cramping, no leaking, no bleeding.  RTC 4 weeks, sooner PRN.  Geraldine Ferro MD

## 2020-07-01 ENCOUNTER — APPOINTMENT (OUTPATIENT)
Dept: INTERPRETER SERVICES | Facility: CLINIC | Age: 40
End: 2020-07-01
Payer: COMMERCIAL

## 2020-07-06 ENCOUNTER — APPOINTMENT (OUTPATIENT)
Dept: INTERPRETER SERVICES | Facility: CLINIC | Age: 40
End: 2020-07-06
Payer: COMMERCIAL

## 2020-07-07 ENCOUNTER — HOSPITAL ENCOUNTER (OUTPATIENT)
Dept: CARDIOLOGY | Facility: CLINIC | Age: 40
Discharge: HOME | End: 2020-07-07
Attending: OBSTETRICS & GYNECOLOGY | Admitting: OBSTETRICS & GYNECOLOGY
Payer: COMMERCIAL

## 2020-07-07 DIAGNOSIS — O24.112 PREGNANCY COMPLICATED BY PRE-EXISTING TYPE 2 DIABETES IN SECOND TRIMESTER: ICD-10-CM

## 2020-07-07 DIAGNOSIS — O09.522 MULTIGRAVIDA OF ADVANCED MATERNAL AGE IN SECOND TRIMESTER: ICD-10-CM

## 2020-07-07 PROCEDURE — 93325 DOPPLER ECHO COLOR FLOW MAPG: CPT

## 2020-07-07 PROCEDURE — T1013 SIGN LANG/ORAL INTERPRETER: HCPCS | Mod: GT

## 2020-07-07 NOTE — PROGRESS NOTES
Fetal Cardiology Consultation    Patient:  Gloria Escobar MRN:  [id number]   YOB: 1980 Age:  40 year old   Date of Visit:  7/7/2020 PCP:  Clinic, [facility]   MILEY: 11/1/2020, by Ultrasound EGA: 23w2d weeks     Dear Dr. Bradley:    I had the pleasure of seeing Gloria Escobar at the [facility] in [place] on 7/7/2020 in consultation for fetal echocardiography results. She presented today by herself; today's visit was facilitated through an . As you know, she is a 40 year old female with T2DM.    The fetal echocardiogram was normal. Normal fetal cardiac anatomy. Normal right and left ventricular size and function without hypertrophy. No evidence of diastolic dysfunction. No pericardial effusion. No arrhythmia.     I reviewed and interpreted the fetal echocardiogram today. I discussed the normal results with Ms. García Escobar with an . While these results are normal, it is important to note that fetal echocardiography cannot exclude small atrial or ventricular septal defects, persistent ductus arteriosus, mild coarctation of the aorta, partial anomalous pulmonary venous return, minor anatomic valve anomalies, or coronary artery anomalies.     Thank you for allowing me to participate in Ms. García Escobar's care. Please don't hesitate to contact me or the Fetal Cardiology team at [facility] with any questions or concerns.     I spent a total of 10 minutes face-to-face with Ms. García Escobar during today's office visit. Over 50% of this time was spent counseling the patient and/or coordinating care regarding the fetal echocardiography results.     Filiberto Corea MD  Pediatric Cardiology  [facility]  Phone [phone]

## 2020-07-14 ENCOUNTER — HOSPITAL ENCOUNTER (OUTPATIENT)
Dept: ULTRASOUND IMAGING | Facility: CLINIC | Age: 40
End: 2020-07-14
Attending: OBSTETRICS & GYNECOLOGY
Payer: COMMERCIAL

## 2020-07-14 ENCOUNTER — OFFICE VISIT (OUTPATIENT)
Dept: MATERNAL FETAL MEDICINE | Facility: CLINIC | Age: 40
End: 2020-07-14
Attending: OBSTETRICS & GYNECOLOGY
Payer: COMMERCIAL

## 2020-07-14 DIAGNOSIS — O24.112 PREGNANCY COMPLICATED BY PRE-EXISTING TYPE 2 DIABETES IN SECOND TRIMESTER: Primary | ICD-10-CM

## 2020-07-14 DIAGNOSIS — O09.522 MULTIGRAVIDA OF ADVANCED MATERNAL AGE IN SECOND TRIMESTER: ICD-10-CM

## 2020-07-14 DIAGNOSIS — O24.112 PREGNANCY COMPLICATED BY PRE-EXISTING TYPE 2 DIABETES IN SECOND TRIMESTER: ICD-10-CM

## 2020-07-14 DIAGNOSIS — O10.919 CHRONIC HYPERTENSION DURING PREGNANCY, ANTEPARTUM: ICD-10-CM

## 2020-07-14 PROBLEM — O09.529 AMA (ADVANCED MATERNAL AGE) MULTIGRAVIDA 35+: Status: ACTIVE | Noted: 2020-07-14

## 2020-07-14 PROCEDURE — 76816 OB US FOLLOW-UP PER FETUS: CPT

## 2020-07-14 NOTE — PROGRESS NOTES
Please refer to ultrasound report under 'Imaging' Studies of 'Chart Review' tabs.    Boy Negron M.D.

## 2020-07-21 ENCOUNTER — PRENATAL OFFICE VISIT (OUTPATIENT)
Dept: OBGYN | Facility: CLINIC | Age: 40
End: 2020-07-21
Payer: COMMERCIAL

## 2020-07-21 VITALS
BODY MASS INDEX: 31.48 KG/M2 | DIASTOLIC BLOOD PRESSURE: 71 MMHG | WEIGHT: 198 LBS | SYSTOLIC BLOOD PRESSURE: 116 MMHG | HEART RATE: 57 BPM | TEMPERATURE: 98 F

## 2020-07-21 DIAGNOSIS — O24.414 INSULIN CONTROLLED GESTATIONAL DIABETES MELLITUS (GDM) IN FIRST TRIMESTER: ICD-10-CM

## 2020-07-21 DIAGNOSIS — E11.65 TYPE 2 DIABETES MELLITUS WITH HYPERGLYCEMIA, WITH LONG-TERM CURRENT USE OF INSULIN (H): ICD-10-CM

## 2020-07-21 DIAGNOSIS — O09.522 MULTIGRAVIDA OF ADVANCED MATERNAL AGE IN SECOND TRIMESTER: Primary | ICD-10-CM

## 2020-07-21 DIAGNOSIS — E11.9 TYPE 2 DIABETES MELLITUS WITHOUT COMPLICATION, WITH LONG-TERM CURRENT USE OF INSULIN (H): ICD-10-CM

## 2020-07-21 DIAGNOSIS — O10.919 CHRONIC HYPERTENSION AFFECTING PREGNANCY: ICD-10-CM

## 2020-07-21 DIAGNOSIS — O24.112 PREGNANCY COMPLICATED BY PRE-EXISTING TYPE 2 DIABETES IN SECOND TRIMESTER: ICD-10-CM

## 2020-07-21 DIAGNOSIS — Z79.4 TYPE 2 DIABETES MELLITUS WITH HYPERGLYCEMIA, WITH LONG-TERM CURRENT USE OF INSULIN (H): ICD-10-CM

## 2020-07-21 DIAGNOSIS — Z79.4 TYPE 2 DIABETES MELLITUS WITHOUT COMPLICATION, WITH LONG-TERM CURRENT USE OF INSULIN (H): ICD-10-CM

## 2020-07-21 PROCEDURE — T1013 SIGN LANG/ORAL INTERPRETER: HCPCS | Mod: U3 | Performed by: OBSTETRICS & GYNECOLOGY

## 2020-07-21 PROCEDURE — 99207 ZZC PRENATAL VISIT: CPT | Performed by: OBSTETRICS & GYNECOLOGY

## 2020-07-21 RX ORDER — LANCETS
EACH MISCELLANEOUS
Qty: 204 EACH | Refills: 6 | Status: SHIPPED | OUTPATIENT
Start: 2020-07-21

## 2020-07-21 RX ORDER — BLOOD SUGAR DIAGNOSTIC
STRIP MISCELLANEOUS
Qty: 150 STRIP | Refills: 6 | Status: SHIPPED | OUTPATIENT
Start: 2020-07-21 | End: 2020-11-03

## 2020-07-21 RX ORDER — INSULIN ASPART 100 [IU]/ML
4 INJECTION, SOLUTION INTRAVENOUS; SUBCUTANEOUS
Qty: 15 ML | Refills: 3 | Status: SHIPPED | OUTPATIENT
Start: 2020-07-21 | End: 2020-09-08

## 2020-07-21 NOTE — PROGRESS NOTES
25w2d  BG within range. Taking insulin 16u in AM and novolog 4u with meals, requests refill, written.  rx for low dose ASA  Growth US 7/14 EFW 666g (43%), MVP 7.4cm.   Recommend fetal growth scan in 4 weeks, every 4 weeks and BPP 2 x a week beginning at 32 weeks. Recommend delivery by 38 weeks.  Phone  used due to language barrier.  RTC 4 weeks, will try to coordinate with [facility] US.   Geraldine Ferro MD

## 2020-08-03 ENCOUNTER — APPOINTMENT (OUTPATIENT)
Dept: INTERPRETER SERVICES | Facility: CLINIC | Age: 40
End: 2020-08-03
Payer: COMMERCIAL

## 2020-08-11 ENCOUNTER — APPOINTMENT (OUTPATIENT)
Dept: INTERPRETER SERVICES | Facility: CLINIC | Age: 40
End: 2020-08-11
Payer: COMMERCIAL

## 2020-08-17 ENCOUNTER — APPOINTMENT (OUTPATIENT)
Dept: INTERPRETER SERVICES | Facility: CLINIC | Age: 40
End: 2020-08-17
Payer: COMMERCIAL

## 2020-08-18 ENCOUNTER — OFFICE VISIT (OUTPATIENT)
Dept: MATERNAL FETAL MEDICINE | Facility: CLINIC | Age: 40
End: 2020-08-18
Attending: OBSTETRICS & GYNECOLOGY
Payer: COMMERCIAL

## 2020-08-18 ENCOUNTER — HOSPITAL ENCOUNTER (OUTPATIENT)
Dept: ULTRASOUND IMAGING | Facility: CLINIC | Age: 40
End: 2020-08-18
Attending: OBSTETRICS & GYNECOLOGY
Payer: COMMERCIAL

## 2020-08-18 ENCOUNTER — PRENATAL OFFICE VISIT (OUTPATIENT)
Dept: OBGYN | Facility: CLINIC | Age: 40
End: 2020-08-18
Payer: COMMERCIAL

## 2020-08-18 VITALS
DIASTOLIC BLOOD PRESSURE: 74 MMHG | WEIGHT: 198 LBS | BODY MASS INDEX: 31.48 KG/M2 | HEART RATE: 72 BPM | SYSTOLIC BLOOD PRESSURE: 121 MMHG | TEMPERATURE: 98.3 F

## 2020-08-18 DIAGNOSIS — O24.112 PREGNANCY COMPLICATED BY PRE-EXISTING TYPE 2 DIABETES IN SECOND TRIMESTER: ICD-10-CM

## 2020-08-18 DIAGNOSIS — O10.919 CHRONIC HYPERTENSION AFFECTING PREGNANCY: ICD-10-CM

## 2020-08-18 DIAGNOSIS — O10.919 CHRONIC HYPERTENSION DURING PREGNANCY, ANTEPARTUM: ICD-10-CM

## 2020-08-18 DIAGNOSIS — O24.112 PREGNANCY COMPLICATED BY PRE-EXISTING TYPE 2 DIABETES IN SECOND TRIMESTER: Primary | ICD-10-CM

## 2020-08-18 DIAGNOSIS — O09.523 MULTIGRAVIDA OF ADVANCED MATERNAL AGE IN THIRD TRIMESTER: Primary | ICD-10-CM

## 2020-08-18 DIAGNOSIS — O24.414 INSULIN CONTROLLED GESTATIONAL DIABETES MELLITUS (GDM) IN THIRD TRIMESTER: ICD-10-CM

## 2020-08-18 PROCEDURE — 99207 ZZC PRENATAL VISIT: CPT | Performed by: OBSTETRICS & GYNECOLOGY

## 2020-08-18 PROCEDURE — 76816 OB US FOLLOW-UP PER FETUS: CPT

## 2020-08-18 PROCEDURE — 90471 IMMUNIZATION ADMIN: CPT | Performed by: OBSTETRICS & GYNECOLOGY

## 2020-08-18 PROCEDURE — 90715 TDAP VACCINE 7 YRS/> IM: CPT | Performed by: OBSTETRICS & GYNECOLOGY

## 2020-08-18 NOTE — PROGRESS NOTES
Please see ultrasound report under Imaging tab for details of today's ultrasound.    Layla Gardner MD  Maternal-Fetal Medicine

## 2020-08-18 NOTE — PROGRESS NOTES
29w2d  Active fetal movement. Occasional cramping, mild and short.  Ran out of test strips.  MFM US today: EFW 1549g (67%), MVP 6cm, tranverse with head to maternal left.  TdaP today.  Childbirth classes? No  Plan on breastfeeding? Yes  Birthcontrol? Undecided, used NFP for past 5 years  Sex on ultrasound? girl  Circumsion? NA  Peds doc? [facility]  RTC 2 weeks.  Geraldine Ferro MD

## 2020-08-18 NOTE — PATIENT INSTRUCTIONS
The best way to prevent an infection is to avoid being exposed to the virus. We recommend that you wash your hands frequently and avoid touching your eyes, nose or mouth.  Practice social distancing by staying home as much as possible, avoiding gatherings and minimize trips for essentials. You should especially avoid any contact with people who are sick. It is possible that people who have the virus have little or no symptoms which is why social distancing is so important to avoid spreading the virus. Clean frequently touched surfaces daily. If you do start to have symptoms such as cough, fever or mild shortness of breath, we recommend you pursue testing [email or web address] and you should stay home for at least 14 days.  If your symptoms are worrisome or severe or you are scheduled during this time to have a clinic visit you should call us at [phone].    Due to anticipated shortage of blood products we recommend all pregnant women take an iron supplement (ferrous gluconate or ferrous sulfate) in addition to a prenatal vitamin.     The hospital has strict visitor restrictions at this time for your safety. Please be aware that visitor restrictions are subject to change. You are allowed to have one healthy adult visitor during your hospital stay, it must be the same person the entire time.    For information about Covid-19 and pregnancy we look to the center for disease control, the CDC. You can look at this information online at:   https://www.cdc.gov/coronavirus/2019-ncov/need-extra-precautions/pregnancy-breastfeeding.html

## 2020-09-08 ENCOUNTER — OFFICE VISIT (OUTPATIENT)
Dept: MATERNAL FETAL MEDICINE | Facility: CLINIC | Age: 40
End: 2020-09-08
Attending: OBSTETRICS & GYNECOLOGY
Payer: COMMERCIAL

## 2020-09-08 ENCOUNTER — HOSPITAL ENCOUNTER (OUTPATIENT)
Dept: ULTRASOUND IMAGING | Facility: CLINIC | Age: 40
End: 2020-09-08
Attending: OBSTETRICS & GYNECOLOGY
Payer: COMMERCIAL

## 2020-09-08 ENCOUNTER — PRENATAL OFFICE VISIT (OUTPATIENT)
Dept: OBGYN | Facility: CLINIC | Age: 40
End: 2020-09-08
Payer: COMMERCIAL

## 2020-09-08 VITALS
DIASTOLIC BLOOD PRESSURE: 80 MMHG | WEIGHT: 201.4 LBS | OXYGEN SATURATION: 97 % | SYSTOLIC BLOOD PRESSURE: 125 MMHG | TEMPERATURE: 97.7 F | BODY MASS INDEX: 32.02 KG/M2 | HEART RATE: 62 BPM

## 2020-09-08 DIAGNOSIS — O24.113 PREGNANCY COMPLICATED BY PRE-EXISTING TYPE 2 DIABETES IN THIRD TRIMESTER: Primary | ICD-10-CM

## 2020-09-08 DIAGNOSIS — O24.414 INSULIN CONTROLLED GESTATIONAL DIABETES MELLITUS (GDM) IN FIRST TRIMESTER: ICD-10-CM

## 2020-09-08 DIAGNOSIS — E11.65 TYPE 2 DIABETES MELLITUS WITH HYPERGLYCEMIA, WITH LONG-TERM CURRENT USE OF INSULIN (H): Primary | ICD-10-CM

## 2020-09-08 DIAGNOSIS — O22.03 VARICOSE VEINS OF LOWER EXTREMITY DURING PREGNANCY IN THIRD TRIMESTER: ICD-10-CM

## 2020-09-08 DIAGNOSIS — O10.919 CHRONIC HYPERTENSION DURING PREGNANCY, ANTEPARTUM: ICD-10-CM

## 2020-09-08 DIAGNOSIS — O09.93 HIGH-RISK PREGNANCY IN THIRD TRIMESTER: ICD-10-CM

## 2020-09-08 DIAGNOSIS — O24.112 PREGNANCY COMPLICATED BY PRE-EXISTING TYPE 2 DIABETES IN SECOND TRIMESTER: ICD-10-CM

## 2020-09-08 DIAGNOSIS — Z79.4 TYPE 2 DIABETES MELLITUS WITH HYPERGLYCEMIA, WITH LONG-TERM CURRENT USE OF INSULIN (H): Primary | ICD-10-CM

## 2020-09-08 PROCEDURE — 76819 FETAL BIOPHYS PROFIL W/O NST: CPT

## 2020-09-08 PROCEDURE — 99214 OFFICE O/P EST MOD 30 MIN: CPT | Performed by: OBSTETRICS & GYNECOLOGY

## 2020-09-08 PROCEDURE — T1013 SIGN LANG/ORAL INTERPRETER: HCPCS | Mod: U3 | Performed by: OBSTETRICS & GYNECOLOGY

## 2020-09-08 RX ORDER — INSULIN ASPART 100 [IU]/ML
4 INJECTION, SOLUTION INTRAVENOUS; SUBCUTANEOUS
Qty: 15 ML | Refills: 3 | Status: SHIPPED | OUTPATIENT
Start: 2020-09-08 | End: 2020-09-30

## 2020-09-08 NOTE — PROGRESS NOTES
"Please see \"Imaging\" tab under \"Chart Review\" for details of today's visit.    Yenifer Soriano    "

## 2020-09-08 NOTE — PROGRESS NOTES
32w2d  Feeling well since last visit.  No concerns or complaints today.  Baby active.  Came from [facility] for BPP, which she reports was good.  Report not available yet.    Type 2 diabetes:  Taking NPH 16 units QAM, and Novolog 4 units with meals.  Needs refills for Novolog, as she was unable to pick it up after the last refill was sent.  Unsure why, as it appears prescription was sent with three refills.  Regardless, new prescription was sent.  Patient reports she isn't following with endocrinology and diabetic ed to manage her blood sugars, but thinks they're normal on the above regimen.  In reviewing her blood sugars, from memory.  Checks fasting and 1 hour after eating.  Fastin today (100 or lower)  Breakfast:  130s typically  Lunch:  135  (higest 188)  Reviewed recommendations for blood sugars for fasting values and 1 hour after eating.  She does have blood sugar log at home, just doesn't have it with her today.  Set up Chance (app)t for her and she will sent pictures of two most recent pages when she gets home tonight.  15yo daughter can help her, as I don't think MyChart can be changed to [place].  I will review and adjust BS if needed.    Chronic hypertension:  Taking nifedipine XL daily.  Declines need for refill.    Varicose veins:  Also complaining of varicose veins, so prescription for compression stockings provided and patient was given info for [employer].    Offered flu shot today.  She's concerned she's had allergic reaction in the past, so she declines.  RTC 2w.  Can be scheduled with [facility] appointments.  Geraldine Sol MD    Please note greater than 50% of this 25 minute appointment were spent in counseling with the patient on issues described above, including diabetic management.

## 2020-09-11 ENCOUNTER — OFFICE VISIT (OUTPATIENT)
Dept: MATERNAL FETAL MEDICINE | Facility: CLINIC | Age: 40
End: 2020-09-11
Attending: OBSTETRICS & GYNECOLOGY
Payer: COMMERCIAL

## 2020-09-11 ENCOUNTER — HOSPITAL ENCOUNTER (OUTPATIENT)
Dept: ULTRASOUND IMAGING | Facility: CLINIC | Age: 40
End: 2020-09-11
Attending: OBSTETRICS & GYNECOLOGY
Payer: COMMERCIAL

## 2020-09-11 DIAGNOSIS — O24.113 PREGNANCY COMPLICATED BY PRE-EXISTING TYPE 2 DIABETES IN THIRD TRIMESTER: Primary | ICD-10-CM

## 2020-09-11 DIAGNOSIS — O24.112 PREGNANCY COMPLICATED BY PRE-EXISTING TYPE 2 DIABETES IN SECOND TRIMESTER: ICD-10-CM

## 2020-09-11 DIAGNOSIS — O10.919 CHRONIC HYPERTENSION DURING PREGNANCY, ANTEPARTUM: ICD-10-CM

## 2020-09-11 PROCEDURE — 76819 FETAL BIOPHYS PROFIL W/O NST: CPT

## 2020-09-14 ENCOUNTER — APPOINTMENT (OUTPATIENT)
Dept: INTERPRETER SERVICES | Facility: CLINIC | Age: 40
End: 2020-09-14
Payer: COMMERCIAL

## 2020-09-15 ENCOUNTER — HOSPITAL ENCOUNTER (OUTPATIENT)
Dept: ULTRASOUND IMAGING | Facility: CLINIC | Age: 40
End: 2020-09-15
Attending: OBSTETRICS & GYNECOLOGY
Payer: COMMERCIAL

## 2020-09-15 ENCOUNTER — OFFICE VISIT (OUTPATIENT)
Dept: MATERNAL FETAL MEDICINE | Facility: CLINIC | Age: 40
End: 2020-09-15
Attending: OBSTETRICS & GYNECOLOGY
Payer: COMMERCIAL

## 2020-09-15 DIAGNOSIS — O24.112 PREGNANCY COMPLICATED BY PRE-EXISTING TYPE 2 DIABETES IN SECOND TRIMESTER: ICD-10-CM

## 2020-09-15 DIAGNOSIS — O10.919 CHRONIC HYPERTENSION DURING PREGNANCY, ANTEPARTUM: ICD-10-CM

## 2020-09-15 DIAGNOSIS — O24.113 PREGNANCY COMPLICATED BY PRE-EXISTING TYPE 2 DIABETES IN THIRD TRIMESTER: Primary | ICD-10-CM

## 2020-09-15 PROCEDURE — 76816 OB US FOLLOW-UP PER FETUS: CPT

## 2020-09-15 PROCEDURE — 76819 FETAL BIOPHYS PROFIL W/O NST: CPT | Performed by: OBSTETRICS & GYNECOLOGY

## 2020-09-15 NOTE — NURSING NOTE
(Marlon #[id number]) via ipad used during patient's ultrasound and physician visit today at the [facility].

## 2020-09-15 NOTE — PROGRESS NOTES
The patient was seen for an ultrasound in the Maternal-Fetal Medicine Center at the [facility] today.  For a detailed report of the ultrasound examination, please see the ultrasound report which can be found under the imaging tab.      Lidia Taveras MD  Maternal-Fetal Medicine

## 2020-09-17 ENCOUNTER — APPOINTMENT (OUTPATIENT)
Dept: INTERPRETER SERVICES | Facility: CLINIC | Age: 40
End: 2020-09-17
Payer: COMMERCIAL

## 2020-09-18 ENCOUNTER — OFFICE VISIT (OUTPATIENT)
Dept: MATERNAL FETAL MEDICINE | Facility: CLINIC | Age: 40
End: 2020-09-18
Attending: OBSTETRICS & GYNECOLOGY
Payer: COMMERCIAL

## 2020-09-18 ENCOUNTER — HOSPITAL ENCOUNTER (OUTPATIENT)
Dept: ULTRASOUND IMAGING | Facility: CLINIC | Age: 40
End: 2020-09-18
Attending: OBSTETRICS & GYNECOLOGY
Payer: COMMERCIAL

## 2020-09-18 DIAGNOSIS — O24.113 PREGNANCY COMPLICATED BY PRE-EXISTING TYPE 2 DIABETES IN THIRD TRIMESTER: Primary | ICD-10-CM

## 2020-09-18 DIAGNOSIS — O24.112 PREGNANCY COMPLICATED BY PRE-EXISTING TYPE 2 DIABETES IN SECOND TRIMESTER: ICD-10-CM

## 2020-09-18 DIAGNOSIS — O10.919 CHRONIC HYPERTENSION DURING PREGNANCY, ANTEPARTUM: ICD-10-CM

## 2020-09-18 PROCEDURE — 76819 FETAL BIOPHYS PROFIL W/O NST: CPT

## 2020-09-18 NOTE — PROGRESS NOTES
Please see ultrasound report under imaging tab for details on ultrasound performed today.    Geraldine Sarmiento MD  , OB/GYN  Maternal-Fetal Medicine  [email or web address]  [phone] (Academic office)  [phone] (Pager)

## 2020-09-22 ENCOUNTER — PRENATAL OFFICE VISIT (OUTPATIENT)
Dept: OBGYN | Facility: CLINIC | Age: 40
End: 2020-09-22
Payer: COMMERCIAL

## 2020-09-22 ENCOUNTER — OFFICE VISIT (OUTPATIENT)
Dept: MATERNAL FETAL MEDICINE | Facility: CLINIC | Age: 40
End: 2020-09-22
Attending: OBSTETRICS & GYNECOLOGY
Payer: COMMERCIAL

## 2020-09-22 ENCOUNTER — HOSPITAL ENCOUNTER (OUTPATIENT)
Dept: ULTRASOUND IMAGING | Facility: CLINIC | Age: 40
End: 2020-09-22
Attending: OBSTETRICS & GYNECOLOGY
Payer: COMMERCIAL

## 2020-09-22 VITALS
BODY MASS INDEX: 32.75 KG/M2 | HEART RATE: 67 BPM | TEMPERATURE: 98.3 F | SYSTOLIC BLOOD PRESSURE: 131 MMHG | DIASTOLIC BLOOD PRESSURE: 80 MMHG | WEIGHT: 206 LBS

## 2020-09-22 DIAGNOSIS — O24.113 PREGNANCY COMPLICATED BY PRE-EXISTING TYPE 2 DIABETES IN THIRD TRIMESTER: Primary | ICD-10-CM

## 2020-09-22 DIAGNOSIS — O09.93 HIGH-RISK PREGNANCY IN THIRD TRIMESTER: Primary | ICD-10-CM

## 2020-09-22 DIAGNOSIS — O10.919 CHRONIC HYPERTENSION DURING PREGNANCY, ANTEPARTUM: ICD-10-CM

## 2020-09-22 DIAGNOSIS — O10.919 CHRONIC HYPERTENSION AFFECTING PREGNANCY: ICD-10-CM

## 2020-09-22 DIAGNOSIS — O24.414 INSULIN CONTROLLED GESTATIONAL DIABETES MELLITUS (GDM) IN THIRD TRIMESTER: ICD-10-CM

## 2020-09-22 DIAGNOSIS — O24.112 PREGNANCY COMPLICATED BY PRE-EXISTING TYPE 2 DIABETES IN SECOND TRIMESTER: ICD-10-CM

## 2020-09-22 PROCEDURE — 99207 ZZC PRENATAL VISIT: CPT | Performed by: OBSTETRICS & GYNECOLOGY

## 2020-09-22 PROCEDURE — 76819 FETAL BIOPHYS PROFIL W/O NST: CPT

## 2020-09-22 NOTE — Clinical Note
Can you have the diabetic educator/RN get in touch with this patient? She needs her insulin increased. I put her most recent BG in my note. Thanks, Geraldine Ferro MD

## 2020-09-22 NOTE — PROGRESS NOTES
34w2d  Active fetal movement. No contractions, no leaking, no bleeding.     US today  BPP, cephalic, normal fluid    BG:   Fastin, 101, 105, 100, 98, 93, 101, 97, 103  1h breakfast: 130, 119, 128, 132, 140, 120, 139, 142, 151  1h lunch: 151, 142, 155, 140, 152, 145, 158, 129  1h dinner: 180, 163, 170, 150, 160, 151, 172, 160    lantus 16u in the morning  novolog 4u with meals    Discussed goal fasting <95 and 1h pp <140 with patient, discussed that she needs to increase insulin, will have diabetic RN make a plan with her.     9/15/2020 US: EFW 2608g (74%)    Discussed IOL likely 38-39 weeks due to type 2 DM and chronic htn on meds, sooner if worsening control of either. Reviewed s/sx pre-eclampsia and kick counts.    RTC weekly.    Geraldine Ferro MD

## 2020-09-23 ENCOUNTER — TELEPHONE (OUTPATIENT)
Dept: EDUCATION SERVICES | Facility: CLINIC | Age: 40
End: 2020-09-23

## 2020-09-23 ENCOUNTER — APPOINTMENT (OUTPATIENT)
Dept: INTERPRETER SERVICES | Facility: CLINIC | Age: 40
End: 2020-09-23
Payer: COMMERCIAL

## 2020-09-23 DIAGNOSIS — O24.414 INSULIN CONTROLLED GESTATIONAL DIABETES MELLITUS (GDM) IN FIRST TRIMESTER: ICD-10-CM

## 2020-09-23 DIAGNOSIS — Z79.4 TYPE 2 DIABETES MELLITUS WITH HYPERGLYCEMIA, WITH LONG-TERM CURRENT USE OF INSULIN (H): ICD-10-CM

## 2020-09-23 DIAGNOSIS — E11.65 TYPE 2 DIABETES MELLITUS WITH HYPERGLYCEMIA, WITH LONG-TERM CURRENT USE OF INSULIN (H): ICD-10-CM

## 2020-09-23 NOTE — PROGRESS NOTES
Followed up with patient by phone today per request. Please see my phone message from today for details.    Tessa Mena RN, [facility]

## 2020-09-23 NOTE — TELEPHONE ENCOUNTER
Gestational Diabetes Follow-up    Subjective/Objective:    I called Gloria Escobar with a  to follow-up on blood glucose logs from her visit yesterday with Dr. Ferro per her request for an increase in patient's insulin doses. Last date of communication with the diabetes educators group was 2020. Last endocrinology appt was 2020. She was started on insulin at that time.     Gestational diabetes is being managed with diet, activity and medications    Taking diabetes medications:   yes:     Diabetes Medication(s)     Biguanides       metFORMIN (GLUCOPHAGE-XR) 500 MG 24 hr tablet    TK 2 TS PO BID    Insulin       insulin glargine (LANTUS SOLOSTAR) 100 UNIT/ML pen    Inject 16 Units Subcutaneous every morning     NOVOLOG FLEXPEN 100 UNIT/ML soln    Inject 4 Units Subcutaneous 3 times daily (with meals)      *METFORMIN IS ON HOLD DURING PREGNANCY    Estimated Date of Delivery: 2020    BG/Food Log:   *From Provider visit note-  BG:   Fastin, 101, 105, 100, 98, 93, 101, 97, 103  1h breakfast: 130, 119, 128, 132, 140, 120, 139, 142, 151  1h lunch: 151, 142, 155, 140, 152, 145, 158, 129  1h dinner: 180, 163, 170, 150, 160, 151, 172, 160     lantus 16u in the morning  novolog 4u with meals    Assessment:    Ketones: Not noted.   Fasting blood glucoses: 11% in target.  After breakfast: 78% in target.  After lunch: 25% in target.  After dinner: 0% in target.    Confirmed the insulin doses with patient as stated above. These are the same doses she was started on by Dr. Elaine Hughes (endo) on 20. Gloria says she is giving her insulin AFTER she eats. I reviewed that she should give it before she eats and why. She is testing 1 hour after meals, but I reviewed why I would like her to check 2 hours after instead since she is on mealtime insulin. I reviewed the BG goal of 120 or under for 2 hours after meal and 95 or less for fasting. Gloria reports she has had  conflicting recommendations in the past about when to give meal insulin and when to test the BG after. I reviewed why I would like her to follow my recommendations going forward and she agreed with plan.    Discussed a follow-up plan for the diabetes educators to help her review BG's and adjust insulin doses.    Reminded Gloria to make a follow-up appt with endocrinology and she agrees.    Gloria did not give any explanation why she hasn't followed up with the diabetes educators or endocrinology the past 5 months.    Plan/Response:  Continue to check BG 4 times daily (fasting and two hours after each meal).  Recommend increase to insulin - Lantus 16 units in am to Lantus 18 units in am.  Recommend to give Novolog BEFORE each meals. Continue with current dose of 4 units at each meal.  Recommend follow-up appt with Endocrinology.  Follow-up diabetes education appts scheduled every Weds at 2pm with Jada DUKE until delivery. Pt knows about next Weds, but I scheduled the other follow-ups after our conversation.  Patient will need follow-up on the GDM meal plan and ketone checking as those were not discussed today.    Tessa Mena RN, CDCES      Any diabetes medication dose changes were made via the CDE Protocol and Collaborative Practice Agreement with the patient's endocrinology provider and OB/GYN provider. A copy of this encounter was shared with the provider.

## 2020-09-23 NOTE — PROGRESS NOTES
"Please see \"Imaging\" tab under \"Chart Review\" for details of today's US.    Veronica Serrano, DO    "

## 2020-09-25 ENCOUNTER — OFFICE VISIT (OUTPATIENT)
Dept: MATERNAL FETAL MEDICINE | Facility: CLINIC | Age: 40
End: 2020-09-25
Attending: OBSTETRICS & GYNECOLOGY
Payer: COMMERCIAL

## 2020-09-25 ENCOUNTER — HOSPITAL ENCOUNTER (OUTPATIENT)
Dept: ULTRASOUND IMAGING | Facility: CLINIC | Age: 40
End: 2020-09-25
Attending: OBSTETRICS & GYNECOLOGY
Payer: COMMERCIAL

## 2020-09-25 DIAGNOSIS — O24.113 PREGNANCY COMPLICATED BY PRE-EXISTING TYPE 2 DIABETES IN THIRD TRIMESTER: ICD-10-CM

## 2020-09-25 DIAGNOSIS — O24.112 PREGNANCY COMPLICATED BY PRE-EXISTING TYPE 2 DIABETES IN SECOND TRIMESTER: ICD-10-CM

## 2020-09-25 DIAGNOSIS — O10.919 CHRONIC HYPERTENSION DURING PREGNANCY, ANTEPARTUM: ICD-10-CM

## 2020-09-25 DIAGNOSIS — O10.919 CHRONIC HYPERTENSION DURING PREGNANCY, ANTEPARTUM: Primary | ICD-10-CM

## 2020-09-25 PROCEDURE — 76819 FETAL BIOPHYS PROFIL W/O NST: CPT

## 2020-09-25 NOTE — PROGRESS NOTES
"Please see \"Imaging\" tab under \"Chart Review\" for details of today's visit.    eYnifer Soriano    "

## 2020-09-29 ENCOUNTER — RECORDS - HEALTHEAST (OUTPATIENT)
Dept: ADMINISTRATIVE | Facility: OTHER | Age: 40
End: 2020-09-29

## 2020-09-29 ENCOUNTER — HOSPITAL ENCOUNTER (OUTPATIENT)
Dept: ULTRASOUND IMAGING | Facility: CLINIC | Age: 40
End: 2020-09-29
Attending: OBSTETRICS & GYNECOLOGY
Payer: COMMERCIAL

## 2020-09-29 ENCOUNTER — OFFICE VISIT (OUTPATIENT)
Dept: MATERNAL FETAL MEDICINE | Facility: CLINIC | Age: 40
End: 2020-09-29
Attending: OBSTETRICS & GYNECOLOGY
Payer: COMMERCIAL

## 2020-09-29 DIAGNOSIS — O10.919 CHRONIC HYPERTENSION DURING PREGNANCY, ANTEPARTUM: Primary | ICD-10-CM

## 2020-09-29 DIAGNOSIS — O24.112 PREGNANCY COMPLICATED BY PRE-EXISTING TYPE 2 DIABETES IN SECOND TRIMESTER: ICD-10-CM

## 2020-09-29 DIAGNOSIS — O24.113 PREGNANCY COMPLICATED BY PRE-EXISTING TYPE 2 DIABETES IN THIRD TRIMESTER: ICD-10-CM

## 2020-09-29 DIAGNOSIS — O10.919 CHRONIC HYPERTENSION DURING PREGNANCY, ANTEPARTUM: ICD-10-CM

## 2020-09-29 PROCEDURE — 76819 FETAL BIOPHYS PROFIL W/O NST: CPT

## 2020-09-30 ENCOUNTER — AMBULATORY - HEALTHEAST (OUTPATIENT)
Dept: MATERNAL FETAL MEDICINE | Facility: HOSPITAL | Age: 40
End: 2020-09-30

## 2020-09-30 ENCOUNTER — TELEPHONE (OUTPATIENT)
Dept: ENDOCRINOLOGY | Facility: CLINIC | Age: 40
End: 2020-09-30

## 2020-09-30 ENCOUNTER — VIRTUAL VISIT (OUTPATIENT)
Dept: EDUCATION SERVICES | Facility: CLINIC | Age: 40
End: 2020-09-30
Payer: COMMERCIAL

## 2020-09-30 DIAGNOSIS — O24.414 INSULIN CONTROLLED GESTATIONAL DIABETES MELLITUS (GDM) IN FIRST TRIMESTER: ICD-10-CM

## 2020-09-30 DIAGNOSIS — O26.90 PREGNANCY, ANTEPARTUM, COMPLICATIONS: ICD-10-CM

## 2020-09-30 DIAGNOSIS — E11.65 TYPE 2 DIABETES MELLITUS WITH HYPERGLYCEMIA, WITH LONG-TERM CURRENT USE OF INSULIN (H): ICD-10-CM

## 2020-09-30 DIAGNOSIS — Z79.4 TYPE 2 DIABETES MELLITUS WITH HYPERGLYCEMIA, WITH LONG-TERM CURRENT USE OF INSULIN (H): ICD-10-CM

## 2020-09-30 PROCEDURE — 98967 PH1 ASSMT&MGMT NQHP 11-20: CPT | Mod: 95

## 2020-09-30 RX ORDER — INSULIN ASPART 100 [IU]/ML
INJECTION, SOLUTION INTRAVENOUS; SUBCUTANEOUS
Qty: 15 ML | Refills: 3 | Status: ON HOLD | OUTPATIENT
Start: 2020-09-30 | End: 2020-10-25

## 2020-09-30 NOTE — Clinical Note
Blood sugars much lower than last week, just increased lunch and dinner novolog.    Jada Gunn MS, RD, LD, CDE

## 2020-09-30 NOTE — PROGRESS NOTES
Gestational Diabetes Follow-up  Patient verbally consented to the telephone visit service today: yes    Subjective/Objective:    Gloria Escobar sent in blood glucose log for review. Last date of communication was: 9/23/20.    Gestational diabetes is being managed with diet, activity and medications    Taking diabetes medications:   yes:     Diabetes Medication(s)     Biguanides       metFORMIN (GLUCOPHAGE-XR) 500 MG 24 hr tablet    TK 2 TS PO BID    Insulin       insulin glargine (LANTUS SOLOSTAR) 100 UNIT/ML pen    Inject 18 Units Subcutaneous every morning     NOVOLOG FLEXPEN 100 UNIT/ML soln    Inject 4 Units Subcutaneous 3 times daily (with meals)          Estimated Date of Delivery: Nov 1, 2020    BG/Food Log: after meal readings are 2 hours after eating    Date Ketones Fasting Post Breakfast Post Lunch Post Supper   9/23  95 120 125 122   9/24  88 115 126 119   9/25  91 109 130 132   9/26  85 121 119 122   9/27  88 117 115 130   9/28  90 113 118 127   9/29  88 111 124  chicken and vegetable soup 126  Pasta- small portion, 1 pc grilled chicken   9/30  86 105           Assessment:  Blood sugars are improved since last week, but after lunch and dinner remain elevated.  Patient is not taking insulin before meals.   Ketones: did not test this week, negative last week.   Fasting blood glucoses: 100% in target.  After breakfast: 88% in target.  After lunch: 42% in target.  After dinner: 14% in target.    Plan/Response:  Recommend increase to insulin - Novolog 4-4-4-0--> 4-5-5-0, no changes to Lantus.  Planned follow up call 1 week.     Jada Gunn MS, RD, LD, CDE  Total time: 17 minutes    Any diabetes medication dose changes were made via the CDE Protocol and Collaborative Practice Agreement with the patient's OB/GYN provider. A copy of this encounter was shared with the provider.

## 2020-10-02 ENCOUNTER — OFFICE VISIT (OUTPATIENT)
Dept: MATERNAL FETAL MEDICINE | Facility: CLINIC | Age: 40
End: 2020-10-02
Attending: OBSTETRICS & GYNECOLOGY
Payer: COMMERCIAL

## 2020-10-02 ENCOUNTER — HOSPITAL ENCOUNTER (OUTPATIENT)
Dept: ULTRASOUND IMAGING | Facility: CLINIC | Age: 40
End: 2020-10-02
Attending: OBSTETRICS & GYNECOLOGY
Payer: COMMERCIAL

## 2020-10-02 DIAGNOSIS — O24.113 PREGNANCY COMPLICATED BY PRE-EXISTING TYPE 2 DIABETES IN THIRD TRIMESTER: ICD-10-CM

## 2020-10-02 DIAGNOSIS — O10.919 CHRONIC HYPERTENSION DURING PREGNANCY, ANTEPARTUM: Primary | ICD-10-CM

## 2020-10-02 PROCEDURE — 76819 FETAL BIOPHYS PROFIL W/O NST: CPT | Mod: 26 | Performed by: OBSTETRICS & GYNECOLOGY

## 2020-10-02 PROCEDURE — 76819 FETAL BIOPHYS PROFIL W/O NST: CPT

## 2020-10-02 PROCEDURE — 99207 PR NO CHARGE LOS: CPT | Performed by: OBSTETRICS & GYNECOLOGY

## 2020-10-05 ENCOUNTER — APPOINTMENT (OUTPATIENT)
Dept: INTERPRETER SERVICES | Facility: CLINIC | Age: 40
End: 2020-10-05
Payer: COMMERCIAL

## 2020-10-05 ENCOUNTER — AMBULATORY - HEALTHEAST (OUTPATIENT)
Dept: MATERNAL FETAL MEDICINE | Facility: HOSPITAL | Age: 40
End: 2020-10-05

## 2020-10-05 NOTE — PROGRESS NOTES
"Outcome for 10/06/20 10:44 AM :Reached patient but they declined to share any data because dont have it with her.   Gloria Escobar is a 40 year old female who is being evaluated via a billable telephone visit.      The patient has been notified of following:     \"This telephone visit will be conducted via a call between you and your physician/provider. We have found that certain health care needs can be provided without the need for a physical exam.  This service lets us provide the care you need with a short phone conversation.  If a prescription is necessary we can send it directly to your pharmacy.  If lab work is needed we can place an order for that and you can then stop by our lab to have the test done at a later time.    Telephone visits are billed at different rates depending on your insurance coverage. During this emergency period, for some insurers they may be billed the same as an in-person visit.  Please reach out to your insurance provider with any questions.  YesIf during the course of the call the physician/provider feels a telephone visit is not appropriate, you will not be charged for this service.\"    Patient has given verbal consent for Telephone visit?  Yes    What phone number would you like to be contacted at? [phone]    How would you like to obtain your AVS? Mail a copy    Phone call duration: 40 minutes    Diabetes Consult Note    Gloria Escobar is a 40 year old female  with Estimated Date of Delivery: 2020 who is now   30 weeks gestation who has been referred for evaluation and management of type 2 diabetes in pregnancy.  Gloria reports that she was diagnosed with type 2 diabetes last year and he had been taking metformin but discontinued this in March as soon as she learned that she was pregnant.  This is her first pregnancy with diabetes.  She has 3 older daughters now ages 16, 13 and 11 and denies any known elevated blood sugars during her " pregnancies with these older children.  Her A1c March 24 at OB intake was far above goal at 8.5.  She established care in our clinic with Dr. Hughes in April but then was lost to follow-up here.  Fortunately she had established care with the diabetic education and has continued to follow with them intermittently.  She last saw Jada dias on September 30th and at that time he had her fasting and post breakfast blood sugars at goal, but had some potentially elevated post lunch and dinner blood glucose values.  She tells me today that that is possibly because she was checking her blood sugar just 1 hour after meals rather than 2.  Nonetheless her short acting insulin, NovoLog, was increased from 4 units with each meal, to 4 units with breakfast and 5 units with lunch and dinner.  Gloria tells me that she continues to take 18 units of basal glargine each day.    Gloria tells me that she has indeed had some low blood sugar she suspects as shortly before lunch while busy at work she felt sweaty shaky weak.  She was busy at work where she is responsible for 8 children and did not have time to check her blood sugar, but she got to her lunch and started eating as quickly as able.    Gloria began this pregnancy at 194 pounds, she then initially last weight, before slightly gaining net of 12 pounds to weight 206 pounds when last weighed on September 22.  She is concerned about diabetic management following birth.  She would like to breast-feed but is anxious about how medications will affect her infant.    Gloria tells me she feels she is eating a very healthy diet with whole grains fruits vegetables limited sodas or sweets.  She is active at work and with her older children and tries to walk or actively clean home on days she is not working.        We reviewed glucometer data together.  It revealed:    BG/Food Log: after meal readings are 2 hours after eating    Fasting: (most recent first) 85, 91, 87, 83, 79, 90, 86,   After  breakfast: 105, 107, 112, 109 101 105 106  P lunch: 116 119 121 120 119 118 122  P dinner:  --, 120, 121, 119, 122, 118, 120    Previously at times was checking 1 hour after and was higher.  A couple of times at work felt shaking sweating a bit but didn't check as that was difficult.  She would be able to have a snack between breakfast and lunch but has not been doing so.  She is not counting her carbohydrates specifically but eating consistent portion sizes.  .  History of Diabetes monitoring and complications/ prevention:  CAD: No  Last eye exam results: : Not Found  Last dental exam: Prior to pregnancy  Microalbuminuria:No results found for: MICROALBUMIN  HTN: Yes on Procardia  On statin: No pregnant contraindicated  On ASA: Yes  Depression: Denies      Cecy  has a past medical history of Hypertension and Type 2 diabetes mellitus (H) (.).  Immunization History   Administered Date(s) Administered     DTaP, Unspecified 04/27/2018     HepB-Adult 04/14/2004, 06/07/2004, 04/27/2018     Influenza (IIV3) PF 10/19/2004, 10/17/2006, 10/19/2009     TDAP Vaccine (Adacel) 08/18/2020     Current Outpatient Medications   Medication     acetone urine (KETOSTIX) test strip     aspirin (ASA) 81 MG EC tablet     blood glucose (ACCU-CHEK GUIDE) test strip     blood glucose (NO BRAND SPECIFIED) test strip     blood glucose monitoring (ACCU-CHEK FASTCLIX) lancets     Blood Glucose Monitoring Suppl (ACCU-CHEK GUIDE ME) w/Device KIT     insulin glargine (LANTUS SOLOSTAR) 100 UNIT/ML pen     insulin pen needle (31G X 5 MM) 31G X 5 MM miscellaneous     metFORMIN (GLUCOPHAGE-XR) 500 MG 24 hr tablet     NIFEdipine ER OSMOTIC (PROCARDIA XL) 30 MG 24 hr tablet     NOVOLOG FLEXPEN 100 UNIT/ML soln     Prenatal Vit-Fe Fumarate-FA (PRENATAL VITAMIN) 27-0.8 MG TABS     No current facility-administered medications for this visit.          Gloria's SH: Alvina is .  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    FH: Her mother had thyroid cancer.  Both her mother and father had diabetes.  Gloria's family history includes Diabetes in her father and mother; Thyroid Cancer in her mother.    ROS:   Patient denies any fevers, chills or sweats as well as any changes in vision, problems with floaters or field cuts in vision, pain or problems with dentition, new or different headaches.  Patient denies symptoms of hypo and hyperglycemia except as above.   Patients denies marked fatigue, cough, shortness of breath, chest pain or pressure.  There has been no pain with or other changes in urination or  itching or pain in genital areas.  Patient denies any noted swelling in feet, ankles or otherwise, loss of sensation or pain  in feet or other areas.    Patient also denies current difficulties with depressed mood, anhedonia or worrying too much.        Exam:    LMP 01/20/2020     General: Pleasant,  female in NAD.   Psych:  Mood is \"good,\" affect is appropriate.  Thought form and content are fluid and coherent.  Displays good insight.  HEENT: No hoarse voice is appreciated.  Resp: Easy and unlabored breathing.  No cough or sneezing.  Neuro: Alert and oriented, communicating clearly.  Rate of speech him in flexion are all within normal limits.  Exam is limited as this is a phone visit conducted to limit spread of COVID virus.    Visit conducted in Samoan.    Wt Readings from Last 10 Encounters:   09/22/20 93.4 kg (206 lb)   09/08/20 91.4 kg (201 lb 6.4 oz)   08/18/20 89.8 kg (198 lb)   07/21/20 89.8 kg (198 lb)   06/25/20 87.5 kg (193 lb)   05/05/20 87.6 kg (193 lb 3.2 oz)   04/08/20 87.1 kg (192 lb)   03/24/20 88 kg (194 lb)   04/24/18 92.1 kg (203 lb)       Data:      Last Basic Metabolic Panel:  Lab Results   Component Value Date     04/24/2018      Lab Results   Component Value Date    POTASSIUM 4.0 04/24/2018     Lab Results   Component Value Date    CHLORIDE 103 04/24/2018     Lab Results   Component Value Date    SWATHI " 8.7 2018     Lab Results   Component Value Date    CO2 24 2018     Lab Results   Component Value Date    BUN 14 2018     Lab Results   Component Value Date    CR 0.52 2020     Lab Results   Component Value Date     2018       GFR Estimate   Date Value Ref Range Status   2020 >90 >60 mL/min/[1.73_m2] Final     Comment:     Non  GFR Calc  Starting 2018, serum creatinine based estimated GFR (eGFR) will be   calculated using the Chronic Kidney Disease Epidemiology Collaboration   (CKD-EPI) equation.     2018 >90 >60 mL/min/1.7m2 Final     Comment:     Non  GFR Calc   2007 >90 >60 mL/min/1.7m2 Final     GFR Estimate If Black   Date Value Ref Range Status   2020 >90 >60 mL/min/[1.73_m2] Final     Comment:      GFR Calc  Starting 2018, serum creatinine based estimated GFR (eGFR) will be   calculated using the Chronic Kidney Disease Epidemiology Collaboration   (CKD-EPI) equation.     2018 >90 >60 mL/min/1.7m2 Final     Comment:      GFR Calc   2007 >90 >60 mL/min/1.7m2 Final         Lab Results   Component Value Date    A1C 8.5 (H) 2020     No results found for: MICROL  No results found for: MICROALBUMIN  No results found for: CPEPT, GADAB, ISCAB  No results found for: CHOL  No results found for: HDL  No results found for: LDL  No results found for: TRIG  No results found for: CHOLHDLRATIO    Most recent eye exam date: : Not Found               Assessment/Plan:      Alvina is a   40-year old female  who is now 30 weeks gestation  with Estimated Date of Delivery: 2020.  Though her blood sugar control was well above goal with an A1c of 8.5 at conception, her blood sugars are now at goal on 18 units of glargine daily with 4 units NovoLog at breakfast 5 at lunch and 5 at dinner.  She is having some hypoglycemia prior to lunch.    Diabetes affecting third  trimester pregnancy: Discussed that her blood sugars are currently at goal and should continue current insulin regimen, though I do suggest that she add a late morning snack.  Discussed ongoing good diet with high-fiber nutritious carbohydrates, avoidance of refined sugars, processed foods, emphasis on plant-based proteins and fish, and daily physical activity for the rest of her life.  As she has had limited weight gain during this pregnancy it is possible that she will not need therapy postpartum, but should follow blood sugars closely to make this determination and work with her care team at that time.  Advised a 10% weight loss over the year following her cessation of breast-feeding.  Discussed that insulin if needed and generally metformin are safe for breast-feeding though I advised her to discuss this with her primary care team at [facility] where she plans to resume care following pregnancy.      Discussed that we expect that her insulin needs should continue to increase for the next 4 to 6 weeks before they slowly taper.  If she should had a sudden drop in blood sugar she should contact her OB provider as well as our team, otherwise she does have visit scheduled with her diabetic educators who have been adjusting her insulin and can continue to see them though happy to consult with her as needed.      All patient questions answered to her satisfaction.      >50% of 40 minute visit spent in face to face counseling, education and coordination of care related to options for better glycemic control as well as preventing, detecting, and treating hypoglycemia.      It is my privilege to be involved in the care of the above patient.     Sandi Mendoza PA-C, MPAS  [facility]  Diabetes, Endocrinology, and Metabolism  [phone] Appointments/Nurse  [phone] pager  [phone] nurse line    This note was completed in part using Dragon voice recognition, and may contain word and  grammatical errors.

## 2020-10-06 ENCOUNTER — VIRTUAL VISIT (OUTPATIENT)
Dept: ENDOCRINOLOGY | Facility: CLINIC | Age: 40
End: 2020-10-06
Payer: COMMERCIAL

## 2020-10-06 ENCOUNTER — OFFICE VISIT (OUTPATIENT)
Dept: MATERNAL FETAL MEDICINE | Facility: CLINIC | Age: 40
End: 2020-10-06
Attending: OBSTETRICS & GYNECOLOGY
Payer: COMMERCIAL

## 2020-10-06 ENCOUNTER — HOSPITAL ENCOUNTER (OUTPATIENT)
Dept: ULTRASOUND IMAGING | Facility: CLINIC | Age: 40
End: 2020-10-06
Attending: OBSTETRICS & GYNECOLOGY
Payer: COMMERCIAL

## 2020-10-06 DIAGNOSIS — O24.113 PREGNANCY COMPLICATED BY PRE-EXISTING TYPE 2 DIABETES IN THIRD TRIMESTER: ICD-10-CM

## 2020-10-06 DIAGNOSIS — E66.811 CLASS 1 OBESITY DUE TO EXCESS CALORIES WITH SERIOUS COMORBIDITY AND BODY MASS INDEX (BMI) OF 30.0 TO 30.9 IN ADULT: ICD-10-CM

## 2020-10-06 DIAGNOSIS — E66.09 CLASS 1 OBESITY DUE TO EXCESS CALORIES WITH SERIOUS COMORBIDITY AND BODY MASS INDEX (BMI) OF 30.0 TO 30.9 IN ADULT: ICD-10-CM

## 2020-10-06 DIAGNOSIS — O24.113 PREGNANCY COMPLICATED BY PRE-EXISTING TYPE 2 DIABETES IN THIRD TRIMESTER: Primary | ICD-10-CM

## 2020-10-06 DIAGNOSIS — O10.919 CHRONIC HYPERTENSION DURING PREGNANCY, ANTEPARTUM: ICD-10-CM

## 2020-10-06 DIAGNOSIS — I10 ESSENTIAL HYPERTENSION: Primary | ICD-10-CM

## 2020-10-06 PROCEDURE — 76819 FETAL BIOPHYS PROFIL W/O NST: CPT | Mod: 26 | Performed by: OBSTETRICS & GYNECOLOGY

## 2020-10-06 PROCEDURE — 76819 FETAL BIOPHYS PROFIL W/O NST: CPT

## 2020-10-06 PROCEDURE — 99214 OFFICE O/P EST MOD 30 MIN: CPT | Mod: TEL | Performed by: PHYSICIAN ASSISTANT

## 2020-10-06 PROCEDURE — 99207 PR NO CHARGE LOS: CPT | Performed by: OBSTETRICS & GYNECOLOGY

## 2020-10-06 NOTE — LETTER
"10/6/2020       RE: Gloria Escobar  [address] [facility] [address]     Dear Colleague,    Thank you for referring your patient, Gloria Escobar, to the [facility] [place] [facility]. Please see a copy of my visit note below.    Outcome for 10/06/20 10:44 AM :Reached patient but they declined to share any data because dont have it with her.   Gloria Escobar is a 40 year old female who is being evaluated via a billable telephone visit.      The patient has been notified of following:     \"This telephone visit will be conducted via a call between you and your physician/provider. We have found that certain health care needs can be provided without the need for a physical exam.  This service lets us provide the care you need with a short phone conversation.  If a prescription is necessary we can send it directly to your pharmacy.  If lab work is needed we can place an order for that and you can then stop by our lab to have the test done at a later time.    Telephone visits are billed at different rates depending on your insurance coverage. During this emergency period, for some insurers they may be billed the same as an in-person visit.  Please reach out to your insurance provider with any questions.  YesIf during the course of the call the physician/provider feels a telephone visit is not appropriate, you will not be charged for this service.\"    Patient has given verbal consent for Telephone visit?  Yes    What phone number would you like to be contacted at? [phone]    How would you like to obtain your AVS? Mail a copy    Phone call duration: 40 minutes    Diabetes Consult Note    Gloria Escobar is a 40 year old female  with Estimated Date of Delivery: 2020 who is now   30 weeks gestation who has been referred for evaluation and management of type 2 diabetes in " pregnancy.  Gloria reports that she was diagnosed with type 2 diabetes last year and he had been taking metformin but discontinued this in March as soon as she learned that she was pregnant.  This is her first pregnancy with diabetes.  She has 3 older daughters now ages 16, 13 and 11 and denies any known elevated blood sugars during her pregnancies with these older children.  Her A1c March 24 at OB intake was far above goal at 8.5.  She established care in our clinic with Dr. Hughes in April but then was lost to follow-up here.  Fortunately she had established care with the diabetic education and has continued to follow with them intermittently.  She last saw Jada dias on September 30th and at that time he had her fasting and post breakfast blood sugars at goal, but had some potentially elevated post lunch and dinner blood glucose values.  She tells me today that that is possibly because she was checking her blood sugar just 1 hour after meals rather than 2.  Nonetheless her short acting insulin, NovoLog, was increased from 4 units with each meal, to 4 units with breakfast and 5 units with lunch and dinner.  Gloria tells me that she continues to take 18 units of basal glargine each day.    Gloria tells me that she has indeed had some low blood sugar she suspects as shortly before lunch while busy at work she felt sweaty shaky weak.  She was busy at work where she is responsible for 8 children and did not have time to check her blood sugar, but she got to her lunch and started eating as quickly as able.    Gloria began this pregnancy at 194 pounds, she then initially last weight, before slightly gaining net of 12 pounds to weight 206 pounds when last weighed on September 22.  She is concerned about diabetic management following birth.  She would like to breast-feed but is anxious about how medications will affect her infant.    Gloria tells me she feels she is eating a very healthy diet with whole grains fruits  vegetables limited sodas or sweets.  She is active at work and with her older children and tries to walk or actively clean home on days she is not working.        We reviewed glucometer data together.  It revealed:    BG/Food Log: after meal readings are 2 hours after eating    Fasting: (most recent first) 85, 91, 87, 83, 79, 90, 86,   After breakfast: 105, 107, 112, 109 101 105 106  P lunch: 116 119 121 120 119 118 122  P dinner:  --, 120, 121, 119, 122, 118, 120    Previously at times was checking 1 hour after and was higher.  A couple of times at work felt shaking sweating a bit but didn't check as that was difficult.  She would be able to have a snack between breakfast and lunch but has not been doing so.  She is not counting her carbohydrates specifically but eating consistent portion sizes.  .  History of Diabetes monitoring and complications/ prevention:  CAD: No  Last eye exam results: : Not Found  Last dental exam: Prior to pregnancy  Microalbuminuria:No results found for: MICROALBUMIN  HTN: Yes on Procardia  On statin: No pregnant contraindicated  On ASA: Yes  Depression: Denies      Gloria's  has a past medical history of Hypertension and Type 2 diabetes mellitus (H) (.).  Immunization History   Administered Date(s) Administered     DTaP, Unspecified 04/27/2018     HepB-Adult 04/14/2004, 06/07/2004, 04/27/2018     Influenza (IIV3) PF 10/19/2004, 10/17/2006, 10/19/2009     TDAP Vaccine (Adacel) 08/18/2020     Current Outpatient Medications   Medication     acetone urine (KETOSTIX) test strip     aspirin (ASA) 81 MG EC tablet     blood glucose (ACCU-CHEK GUIDE) test strip     blood glucose (NO BRAND SPECIFIED) test strip     blood glucose monitoring (ACCU-CHEK FASTCLIX) lancets     Blood Glucose Monitoring Suppl (ACCU-CHEK GUIDE ME) w/Device KIT     insulin glargine (LANTUS SOLOSTAR) 100 UNIT/ML pen     insulin pen needle (31G X 5 MM) 31G X 5 MM miscellaneous     metFORMIN (GLUCOPHAGE-XR) 500 MG 24 hr tablet  "    NIFEdipine ER OSMOTIC (PROCARDIA XL) 30 MG 24 hr tablet     NOVOLOG FLEXPEN 100 UNIT/ML soln     Prenatal Vit-Fe Fumarate-FA (PRENATAL VITAMIN) 27-0.8 MG TABS     No current facility-administered medications for this visit.          Gloria's SH: Alvina is .  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    FH: Her mother had thyroid cancer.  Both her mother and father had diabetes.  Gloria's family history includes Diabetes in her father and mother; Thyroid Cancer in her mother.    ROS:   Patient denies any fevers, chills or sweats as well as any changes in vision, problems with floaters or field cuts in vision, pain or problems with dentition, new or different headaches.  Patient denies symptoms of hypo and hyperglycemia except as above.   Patients denies marked fatigue, cough, shortness of breath, chest pain or pressure.  There has been no pain with or other changes in urination or  itching or pain in genital areas.  Patient denies any noted swelling in feet, ankles or otherwise, loss of sensation or pain  in feet or other areas.    Patient also denies current difficulties with depressed mood, anhedonia or worrying too much.        Exam:    LMP 01/20/2020     General: Pleasant,  female in NAD.   Psych:  Mood is \"good,\" affect is appropriate.  Thought form and content are fluid and coherent.  Displays good insight.  HEENT: No hoarse voice is appreciated.  Resp: Easy and unlabored breathing.  No cough or sneezing.  Neuro: Alert and oriented, communicating clearly.  Rate of speech him in flexion are all within normal limits.  Exam is limited as this is a phone visit conducted to limit spread of COVID virus.    Visit conducted in Slovenian.    Wt Readings from Last 10 Encounters:   09/22/20 93.4 kg (206 lb)   09/08/20 91.4 kg (201 lb 6.4 oz)   08/18/20 89.8 kg (198 lb)   07/21/20 89.8 kg (198 lb)   06/25/20 87.5 kg (193 lb)   05/05/20 87.6 kg (193 lb 3.2 oz)   04/08/20 87.1 kg (192 lb) "   20 88 kg (194 lb)   18 92.1 kg (203 lb)       Data:      Last Basic Metabolic Panel:  Lab Results   Component Value Date     2018      Lab Results   Component Value Date    POTASSIUM 4.0 2018     Lab Results   Component Value Date    CHLORIDE 103 2018     Lab Results   Component Value Date    SWATHI 8.7 2018     Lab Results   Component Value Date    CO2 24 2018     Lab Results   Component Value Date    BUN 14 2018     Lab Results   Component Value Date    CR 0.52 2020     Lab Results   Component Value Date     2018       GFR Estimate   Date Value Ref Range Status   2020 >90 >60 mL/min/[1.73_m2] Final     Comment:     Non  GFR Calc  Starting 2018, serum creatinine based estimated GFR (eGFR) will be   calculated using the Chronic Kidney Disease Epidemiology Collaboration   (CKD-EPI) equation.     2018 >90 >60 mL/min/1.7m2 Final     Comment:     Non  GFR Calc   2007 >90 >60 mL/min/1.7m2 Final     GFR Estimate If Black   Date Value Ref Range Status   2020 >90 >60 mL/min/[1.73_m2] Final     Comment:      GFR Calc  Starting 2018, serum creatinine based estimated GFR (eGFR) will be   calculated using the Chronic Kidney Disease Epidemiology Collaboration   (CKD-EPI) equation.     2018 >90 >60 mL/min/1.7m2 Final     Comment:      GFR Calc   2007 >90 >60 mL/min/1.7m2 Final         Lab Results   Component Value Date    A1C 8.5 (H) 2020     No results found for: MICROL  No results found for: MICROALBUMIN  No results found for: CPEPT, GADAB, ISCAB  No results found for: CHOL  No results found for: HDL  No results found for: LDL  No results found for: TRIG  No results found for: CHOLHDLRATIO    Most recent eye exam date: : Not Found         Assessment/Plan:      Alvina is a   40-year old female  who is now 30 weeks gestation  with  Estimated Date of Delivery: Nov 1, 2020.  Though her blood sugar control was well above goal with an A1c of 8.5 at conception, her blood sugars are now at goal on 18 units of glargine daily with 4 units NovoLog at breakfast 5 at lunch and 5 at dinner.  She is having some hypoglycemia prior to lunch.    Diabetes affecting third trimester pregnancy: Discussed that her blood sugars are currently at goal and should continue current insulin regimen, though I do suggest that she add a late morning snack.  Discussed ongoing good diet with high-fiber nutritious carbohydrates, avoidance of refined sugars, processed foods, emphasis on plant-based proteins and fish, and daily physical activity for the rest of her life.  As she has had limited weight gain during this pregnancy it is possible that she will not need therapy postpartum, but should follow blood sugars closely to make this determination and work with her care team at that time.  Advised a 10% weight loss over the year following her cessation of breast-feeding.  Discussed that insulin if needed and generally metformin are safe for breast-feeding though I advised her to discuss this with her primary care team at [facility] where she plans to resume care following pregnancy.    Discussed that we expect that her insulin needs should continue to increase for the next 4 to 6 weeks before they slowly taper.  If she should had a sudden drop in blood sugar she should contact her OB provider as well as our team, otherwise she does have visit scheduled with her diabetic educators who have been adjusting her insulin and can continue to see them though happy to consult with her as needed.    All patient questions answered to her satisfaction.    >50% of 40 minute visit spent in face to face counseling, education and coordination of care related to options for better glycemic control as well as preventing, detecting, and treating hypoglycemia.      It is my privilege to  be involved in the care of the above patient.     Sandi Mendoza PA-C, MPAS  [facility]  Diabetes, Endocrinology, and Metabolism  [phone] Appointments/Nurse  [phone] pager  [phone] nurse line    This note was completed in part using Dragon voice recognition, and may contain word and grammatical errors.

## 2020-10-09 ENCOUNTER — PRENATAL OFFICE VISIT (OUTPATIENT)
Dept: OBGYN | Facility: CLINIC | Age: 40
End: 2020-10-09
Payer: COMMERCIAL

## 2020-10-09 VITALS
DIASTOLIC BLOOD PRESSURE: 81 MMHG | HEART RATE: 65 BPM | WEIGHT: 210.8 LBS | HEIGHT: 67 IN | BODY MASS INDEX: 33.09 KG/M2 | SYSTOLIC BLOOD PRESSURE: 146 MMHG

## 2020-10-09 DIAGNOSIS — O09.93 HIGH-RISK PREGNANCY IN THIRD TRIMESTER: Primary | ICD-10-CM

## 2020-10-09 DIAGNOSIS — O10.919 CHRONIC HYPERTENSION AFFECTING PREGNANCY: ICD-10-CM

## 2020-10-09 DIAGNOSIS — Z79.4 TYPE 2 DIABETES MELLITUS WITHOUT COMPLICATION, WITH LONG-TERM CURRENT USE OF INSULIN (H): ICD-10-CM

## 2020-10-09 DIAGNOSIS — E11.9 TYPE 2 DIABETES MELLITUS WITHOUT COMPLICATION, WITH LONG-TERM CURRENT USE OF INSULIN (H): ICD-10-CM

## 2020-10-09 LAB
ALT SERPL W P-5'-P-CCNC: 18 U/L (ref 0–50)
AST SERPL W P-5'-P-CCNC: 16 U/L (ref 0–45)
CREAT SERPL-MCNC: 0.67 MG/DL (ref 0.52–1.04)
CREAT UR-MCNC: 82 MG/DL
ERYTHROCYTE [DISTWIDTH] IN BLOOD BY AUTOMATED COUNT: 13.2 % (ref 10–15)
GFR SERPL CREATININE-BSD FRML MDRD: >90 ML/MIN/{1.73_M2}
HBA1C MFR BLD: 5.5 % (ref 0–5.6)
HCT VFR BLD AUTO: 33.9 % (ref 35–47)
HGB BLD-MCNC: 11.4 G/DL (ref 11.7–15.7)
MCH RBC QN AUTO: 30.8 PG (ref 26.5–33)
MCHC RBC AUTO-ENTMCNC: 33.6 G/DL (ref 31.5–36.5)
MCV RBC AUTO: 92 FL (ref 78–100)
PLATELET # BLD AUTO: 229 10E9/L (ref 150–450)
PROT UR-MCNC: 0.18 G/L
PROT/CREAT 24H UR: 0.22 G/G CR (ref 0–0.2)
RBC # BLD AUTO: 3.7 10E12/L (ref 3.8–5.2)
WBC # BLD AUTO: 8.6 10E9/L (ref 4–11)

## 2020-10-09 PROCEDURE — 83036 HEMOGLOBIN GLYCOSYLATED A1C: CPT | Performed by: OBSTETRICS & GYNECOLOGY

## 2020-10-09 PROCEDURE — 84460 ALANINE AMINO (ALT) (SGPT): CPT | Performed by: OBSTETRICS & GYNECOLOGY

## 2020-10-09 PROCEDURE — 84156 ASSAY OF PROTEIN URINE: CPT | Performed by: OBSTETRICS & GYNECOLOGY

## 2020-10-09 PROCEDURE — 99207 PR PRENATAL VISIT: CPT | Performed by: OBSTETRICS & GYNECOLOGY

## 2020-10-09 PROCEDURE — T1013 SIGN LANG/ORAL INTERPRETER: HCPCS | Mod: U4

## 2020-10-09 PROCEDURE — 87186 SC STD MICRODIL/AGAR DIL: CPT | Performed by: OBSTETRICS & GYNECOLOGY

## 2020-10-09 PROCEDURE — 85027 COMPLETE CBC AUTOMATED: CPT | Performed by: OBSTETRICS & GYNECOLOGY

## 2020-10-09 PROCEDURE — 36415 COLL VENOUS BLD VENIPUNCTURE: CPT | Performed by: OBSTETRICS & GYNECOLOGY

## 2020-10-09 PROCEDURE — 87653 STREP B DNA AMP PROBE: CPT | Performed by: OBSTETRICS & GYNECOLOGY

## 2020-10-09 PROCEDURE — 82565 ASSAY OF CREATININE: CPT | Performed by: OBSTETRICS & GYNECOLOGY

## 2020-10-09 PROCEDURE — 84450 TRANSFERASE (AST) (SGOT): CPT | Performed by: OBSTETRICS & GYNECOLOGY

## 2020-10-09 ASSESSMENT — PATIENT HEALTH QUESTIONNAIRE - PHQ9: SUM OF ALL RESPONSES TO PHQ QUESTIONS 1-9: 2

## 2020-10-09 ASSESSMENT — MIFFLIN-ST. JEOR: SCORE: 1650.87

## 2020-10-09 ASSESSMENT — PAIN SCALES - GENERAL: PAINLEVEL: NO PAIN (0)

## 2020-10-09 NOTE — LETTER
October 12, 2020      Gloria Escobar  [address] [facility] [address]        Dear Ms.Sanchez Escobar,    We are writing to inform you of your test results.  Your results are normal.    Resulted Orders   Group B strep PCR   Result Value Ref Range    Group B Strep PCR Spec Omar Vaginal Rectal     Group B Strep PCR Positive (A) NEG^Negative      Comment:      Positive: GBS DNA detected, presumed positive for GBS.  Assay performed on incubated broth culture of specimen using [employer] real-time   PCR.     Protein  random urine with Creat Ratio   Result Value Ref Range    Protein Random Urine 0.18 g/L    Protein Total Urine g/gr Creatinine 0.22 (H) 0 - 0.2 g/g Cr   ALT   Result Value Ref Range    ALT 18 0 - 50 U/L   AST   Result Value Ref Range    AST 16 0 - 45 U/L   Creatinine   Result Value Ref Range    Creatinine 0.67 0.52 - 1.04 mg/dL    GFR Estimate >90 >60 mL/min/[1.73_m2]      Comment:      Non  GFR Calc  Starting 12/18/2018, serum creatinine based estimated GFR (eGFR) will be   calculated using the Chronic Kidney Disease Epidemiology Collaboration   (CKD-EPI) equation.      GFR Estimate If Black >90 >60 mL/min/[1.73_m2]      Comment:       GFR Calc  Starting 12/18/2018, serum creatinine based estimated GFR (eGFR) will be   calculated using the Chronic Kidney Disease Epidemiology Collaboration   (CKD-EPI) equation.     CBC with platelets   Result Value Ref Range    WBC 8.6 4.0 - 11.0 10e9/L    RBC Count 3.70 (L) 3.8 - 5.2 10e12/L    Hemoglobin 11.4 (L) 11.7 - 15.7 g/dL    Hematocrit 33.9 (L) 35.0 - 47.0 %    MCV 92 78 - 100 fl    MCH 30.8 26.5 - 33.0 pg    MCHC 33.6 31.5 - 36.5 g/dL    RDW 13.2 10.0 - 15.0 %    Platelet Count 229 150 - 450 10e9/L   Hemoglobin A1c   Result Value Ref Range    Hemoglobin A1C 5.5 0 - 5.6 %      Comment:      Normal <5.7% Prediabetes 5.7-6.4%  Diabetes 6.5% or higher - adopted from ADA   consensus guidelines.     Creatinine urine calculation  only   Result Value Ref Range    Creatinine Urine 82 mg/dL       If you have any questions or concerns, please call the clinic at [phone].       Sincerely,        Geraldine Ferro MD

## 2020-10-09 NOTE — PROGRESS NOTES
36w5d  BP mildly elevated today. She is taking nifedipine daily. No headache, no visual change, no RUQ pain, a little lower extremity swelling. Will check UPC and HELLP labs today.     Active fetal movement. No contractions.     Fasting: (most recent first) 85, 91, 87, 83, 79, 90, 86,   After breakfast: 105, 107, 112, 109 101 105 106  P lunch: 116 119 121 120 119 118 122  P dinner:  --, 120, 121, 119, 122, 118, 120    BG fasting today 90, pp 107, 109.     GBS collected today.     Discussed variable fetal presentation (breech, then transverse). Discussed if not cephalic would either recommend ECV or CS. Discussed large AC on last growth US, risk of shoulder dystocia. Has growth US next week, I will see her after and we can discussed delivery timing and plan more then.    Reviewed visitor restrictions at the hospital for COVID.   Reviewed labor instructions, kick counts, s/sx pre-eclampsia.    RTC weekly.  Geraldine Ferro MD

## 2020-10-09 NOTE — LETTER
[facility]  [address] [place] [id number]  [phone]      October 12, 2020      Gloria Escobar  [address] [facility] [address]              Dear Gloria,    Your vaginal beta strep culture was positive so you will need to be given antibiotics in labor.  If you have any questions or concerns, please call the clinic at [phone].      Sincerely,      Geraldine Ferro MD

## 2020-10-09 NOTE — PROGRESS NOTES
"Chief Complaint   Patient presents with     Prenatal Care     36+5       Initial LMP 2020  Estimated body mass index is 32.75 kg/m  as calculated from the following:    Height as of 20: 1.689 m (5' 6.5\").    Weight as of 20: 93.4 kg (206 lb).  BP completed using cuff size: large    Questioned patient about current smoking habits.  Pt. has never smoked.          The following HM Due: Vaccinations: flu       The following patient reported/Care Every where data was sent to:  P ABSTRACT QUALITY INITIATIVES [15812]  n/a      patient has appointment for today                "

## 2020-10-10 LAB
GP B STREP DNA SPEC QL NAA+PROBE: POSITIVE
SPECIMEN SOURCE: ABNORMAL

## 2020-10-11 PROBLEM — O99.820 GROUP B STREPTOCOCCUS CARRIER, +RV CULTURE, CURRENTLY PREGNANT: Status: ACTIVE | Noted: 2020-10-11

## 2020-10-14 ENCOUNTER — TELEPHONE (OUTPATIENT)
Dept: OBGYN | Facility: CLINIC | Age: 40
End: 2020-10-14

## 2020-10-14 ENCOUNTER — VIRTUAL VISIT (OUTPATIENT)
Dept: EDUCATION SERVICES | Facility: CLINIC | Age: 40
End: 2020-10-14
Payer: COMMERCIAL

## 2020-10-14 ENCOUNTER — HOSPITAL ENCOUNTER (OUTPATIENT)
Dept: ULTRASOUND IMAGING | Facility: CLINIC | Age: 40
End: 2020-10-14
Attending: OBSTETRICS & GYNECOLOGY
Payer: COMMERCIAL

## 2020-10-14 ENCOUNTER — PRENATAL OFFICE VISIT (OUTPATIENT)
Dept: OBGYN | Facility: CLINIC | Age: 40
End: 2020-10-14
Payer: COMMERCIAL

## 2020-10-14 ENCOUNTER — OFFICE VISIT (OUTPATIENT)
Dept: MATERNAL FETAL MEDICINE | Facility: CLINIC | Age: 40
End: 2020-10-14
Attending: OBSTETRICS & GYNECOLOGY
Payer: COMMERCIAL

## 2020-10-14 VITALS
BODY MASS INDEX: 32.91 KG/M2 | SYSTOLIC BLOOD PRESSURE: 155 MMHG | WEIGHT: 207 LBS | TEMPERATURE: 97.4 F | HEART RATE: 54 BPM | DIASTOLIC BLOOD PRESSURE: 85 MMHG

## 2020-10-14 DIAGNOSIS — O09.93 HIGH-RISK PREGNANCY IN THIRD TRIMESTER: Primary | ICD-10-CM

## 2020-10-14 DIAGNOSIS — O32.2XX0 TRANSVERSE LIE OF FETUS, SINGLE OR UNSPECIFIED FETUS: ICD-10-CM

## 2020-10-14 DIAGNOSIS — O24.113 PREGNANCY COMPLICATED BY PRE-EXISTING TYPE 2 DIABETES IN THIRD TRIMESTER: ICD-10-CM

## 2020-10-14 DIAGNOSIS — O10.919 CHRONIC HYPERTENSION DURING PREGNANCY, ANTEPARTUM: Primary | ICD-10-CM

## 2020-10-14 DIAGNOSIS — O10.919 CHRONIC HYPERTENSION AFFECTING PREGNANCY: ICD-10-CM

## 2020-10-14 DIAGNOSIS — O24.419 GESTATIONAL DIABETES: Primary | ICD-10-CM

## 2020-10-14 DIAGNOSIS — Z11.59 ENCOUNTER FOR SCREENING FOR OTHER VIRAL DISEASES: Primary | ICD-10-CM

## 2020-10-14 LAB
ALT SERPL W P-5'-P-CCNC: 15 U/L (ref 0–50)
AST SERPL W P-5'-P-CCNC: 16 U/L (ref 0–45)
BACTERIA SPEC CULT: ABNORMAL
CREAT SERPL-MCNC: 0.72 MG/DL (ref 0.52–1.04)
CREAT UR-MCNC: 119 MG/DL
ERYTHROCYTE [DISTWIDTH] IN BLOOD BY AUTOMATED COUNT: 13.4 % (ref 10–15)
GFR SERPL CREATININE-BSD FRML MDRD: >90 ML/MIN/{1.73_M2}
HCT VFR BLD AUTO: 34.6 % (ref 35–47)
HGB BLD-MCNC: 11.8 G/DL (ref 11.7–15.7)
MCH RBC QN AUTO: 30.8 PG (ref 26.5–33)
MCHC RBC AUTO-ENTMCNC: 34.1 G/DL (ref 31.5–36.5)
MCV RBC AUTO: 90 FL (ref 78–100)
PLATELET # BLD AUTO: 233 10E9/L (ref 150–450)
PROT UR-MCNC: 0.28 G/L
PROT/CREAT 24H UR: 0.24 G/G CR (ref 0–0.2)
RBC # BLD AUTO: 3.83 10E12/L (ref 3.8–5.2)
SPECIMEN SOURCE: ABNORMAL
WBC # BLD AUTO: 8.2 10E9/L (ref 4–11)

## 2020-10-14 PROCEDURE — 85027 COMPLETE CBC AUTOMATED: CPT | Performed by: OBSTETRICS & GYNECOLOGY

## 2020-10-14 PROCEDURE — 76819 FETAL BIOPHYS PROFIL W/O NST: CPT

## 2020-10-14 PROCEDURE — 84156 ASSAY OF PROTEIN URINE: CPT | Performed by: OBSTETRICS & GYNECOLOGY

## 2020-10-14 PROCEDURE — 82565 ASSAY OF CREATININE: CPT | Performed by: OBSTETRICS & GYNECOLOGY

## 2020-10-14 PROCEDURE — 76816 OB US FOLLOW-UP PER FETUS: CPT | Mod: 26 | Performed by: OBSTETRICS & GYNECOLOGY

## 2020-10-14 PROCEDURE — 36415 COLL VENOUS BLD VENIPUNCTURE: CPT | Performed by: OBSTETRICS & GYNECOLOGY

## 2020-10-14 PROCEDURE — 84460 ALANINE AMINO (ALT) (SGPT): CPT | Performed by: OBSTETRICS & GYNECOLOGY

## 2020-10-14 PROCEDURE — 76819 FETAL BIOPHYS PROFIL W/O NST: CPT | Mod: 26 | Performed by: OBSTETRICS & GYNECOLOGY

## 2020-10-14 PROCEDURE — 98967 PH1 ASSMT&MGMT NQHP 11-20: CPT

## 2020-10-14 PROCEDURE — 99207 PR PRENATAL VISIT: CPT | Performed by: OBSTETRICS & GYNECOLOGY

## 2020-10-14 PROCEDURE — 76819 FETAL BIOPHYS PROFIL W/O NST: CPT | Performed by: OBSTETRICS & GYNECOLOGY

## 2020-10-14 PROCEDURE — 59425 ANTEPARTUM CARE ONLY: CPT | Performed by: OBSTETRICS & GYNECOLOGY

## 2020-10-14 PROCEDURE — 84450 TRANSFERASE (AST) (SGOT): CPT | Performed by: OBSTETRICS & GYNECOLOGY

## 2020-10-14 RX ORDER — AMOXICILLIN 250 MG
1 CAPSULE ORAL DAILY
Qty: 100 TABLET | Refills: 0 | Status: ON HOLD | OUTPATIENT
Start: 2020-10-14 | End: 2020-10-25

## 2020-10-14 RX ORDER — IBUPROFEN 600 MG/1
600 TABLET, FILM COATED ORAL EVERY 6 HOURS PRN
Qty: 60 TABLET | Refills: 0 | Status: ON HOLD | OUTPATIENT
Start: 2020-10-14 | End: 2020-10-25

## 2020-10-14 RX ORDER — ACETAMINOPHEN 325 MG/1
650 TABLET ORAL EVERY 6 HOURS PRN
Qty: 100 TABLET | Refills: 0 | Status: ON HOLD | OUTPATIENT
Start: 2020-10-14 | End: 2020-10-25

## 2020-10-14 NOTE — PROGRESS NOTES
"Please see \"Imaging\" tab under \"Chart Review\" for details of today's visit.    Geraldine Frederick MD PhD  Maternal Fetal Medicine     "

## 2020-10-14 NOTE — TELEPHONE ENCOUNTER
Type of surgery: ob  Location of surgery: [facility] OR  Date and time of surgery: 10/22/20 830a  Surgeon: Pillo  Pre-Op Appt Date: surgeon to do  Post-Op Appt Date: 10/29/20 and 6 weeks   Packet sent out: Yes  Pre-cert/Authorization completed:  No  Date: 10/14/20  Brigida Piedra, Surgery Coordinator

## 2020-10-14 NOTE — PROGRESS NOTES
37w3d  Active fetal movement. Rare contractions. No leaking, bleeding or abnormal discharge. No headache, no visual change, no swelling.   BP a little high this morning but has not taken nifedipine yet today, forgot this morning.  BG 87 this morning. Reports BG wnl for past week with exception of one high one on . hgb A1c improved from 8.5 to 5.5% but may be falsely low due to new Hgb production in pregnancy.  Reports all babies were in 8lb range, biggest baby was almost 9lb.     US:   EFW 3589g (7lb 15oz), 82%, AC >96%, MVP 8.93  Fetus is tranverse    GBS: Positive  Hemoglobin   Date Value Ref Range Status   10/09/2020 11.4 (L) 11.7 - 15.7 g/dL Final     Resident involvement: discussed and agrees.  OTC postpartum meds: done     Recommend timing of delivery - 38-38w6d weeks for chronic htn on meds  Discussed options for transverse baby - ECV and IOL versus  section. Discussed risks of ECV. Discussed risk of shoulder dystocia in setting of large AC and maternal DM. It is unpredictable but can have serious risks including brachial plexus injury, clavicle or humerus fracture, HIE, death, maternal hemorrhage, worse perineal laceration. Discussed risks of CS - infection that may need antibiotics to treat, injury to other organs, risk of excessive blood loss and potential need for blood transfusion. Patient prefers delivery by  section.  Counseled patient on steps of procedure, recovery.  Patient desires 10/22 if available.    Reviewed labor instructions, kick counts, s/sx pre-eclampsia.    Due to language barrier, a phone  was used during the history-taking, exam and subsequent discussion with this patient.    RTC weekly.  Geraldine Ferro MD

## 2020-10-14 NOTE — PROGRESS NOTES
Gestational Diabetes Follow-up  Patient verbally consented to the telephone visit service today: yes      Subjective/Objective:    Gloria Escobar sent in blood glucose log for review. Last date of communication was: 10/6/20.    Gestational diabetes is being managed with diet, activity and medications    Taking diabetes medications:   yes:     Diabetes Medication(s)     Biguanides       metFORMIN (GLUCOPHAGE-XR) 500 MG 24 hr tablet    TK 2 TS PO BID    Insulin       insulin glargine (LANTUS SOLOSTAR) 100 UNIT/ML pen    Inject 18 Units Subcutaneous every morning     NOVOLOG FLEXPEN 100 UNIT/ML soln    Inject 4 units with breakfast, 5 units before lunch and dinner.          Estimated Date of Delivery: Nov 1, 2020    BG/Food Log:     Date Ketones Fasting Post Breakfast Post Lunch Post Supper   10/7  90 108 118    10/8  88 110 114 120   10/9  92 106 118 125   10/10  89 112 120 130   10/11  90 116 121 123   10/12  88 114 120 118   10/13  85  116 120   10/14  87 110       1.5 hours after eating    Assessment:  Blood sugars in target at current insulin doses no changes recommended.     Fasting blood glucoses: 100% in target.  After breakfast: 100% in target.  After lunch: 100% in target.  After dinner: 100% in target.    Plan/Response:  No changes in the patient's current treatment plan.  Patient to follow up with PCP after delivery if blood sugars elevated    Jada Gunn MS, RD, LD, CDE  Total time: 11 minutes    Any diabetes medication dose changes were made via the CDE Protocol and Collaborative Practice Agreement with the patient's OB/GYN provider. A copy of this encounter was shared with the provider.

## 2020-10-14 NOTE — NURSING NOTE
(Abel) used via ipad for patient's ultrasound and provider visit at [facility] today.

## 2020-10-15 DIAGNOSIS — O09.93 HIGH-RISK PREGNANCY IN THIRD TRIMESTER: ICD-10-CM

## 2020-10-15 DIAGNOSIS — Z01.818 PRE-OP EXAM: Primary | ICD-10-CM

## 2020-10-16 ENCOUNTER — APPOINTMENT (OUTPATIENT)
Dept: INTERPRETER SERVICES | Facility: CLINIC | Age: 40
End: 2020-10-16
Payer: COMMERCIAL

## 2020-10-19 DIAGNOSIS — Z01.818 PRE-OP EXAM: ICD-10-CM

## 2020-10-19 DIAGNOSIS — Z11.59 ENCOUNTER FOR SCREENING FOR OTHER VIRAL DISEASES: ICD-10-CM

## 2020-10-19 DIAGNOSIS — O09.93 HIGH-RISK PREGNANCY IN THIRD TRIMESTER: ICD-10-CM

## 2020-10-19 LAB
ABO + RH BLD: NORMAL
ABO + RH BLD: NORMAL
BLD GP AB SCN SERPL QL: NORMAL
BLOOD BANK CMNT PATIENT-IMP: NORMAL
ERYTHROCYTE [DISTWIDTH] IN BLOOD BY AUTOMATED COUNT: 13.4 % (ref 10–15)
HCT VFR BLD AUTO: 32.9 % (ref 35–47)
HGB BLD-MCNC: 11.2 G/DL (ref 11.7–15.7)
MCH RBC QN AUTO: 30.6 PG (ref 26.5–33)
MCHC RBC AUTO-ENTMCNC: 34 G/DL (ref 31.5–36.5)
MCV RBC AUTO: 90 FL (ref 78–100)
PLATELET # BLD AUTO: 238 10E9/L (ref 150–450)
RBC # BLD AUTO: 3.66 10E12/L (ref 3.8–5.2)
SPECIMEN EXP DATE BLD: NORMAL
WBC # BLD AUTO: 8.2 10E9/L (ref 4–11)

## 2020-10-19 PROCEDURE — 86901 BLOOD TYPING SEROLOGIC RH(D): CPT | Performed by: OBSTETRICS & GYNECOLOGY

## 2020-10-19 PROCEDURE — 99000 SPECIMEN HANDLING OFFICE-LAB: CPT | Performed by: OBSTETRICS & GYNECOLOGY

## 2020-10-19 PROCEDURE — 86900 BLOOD TYPING SEROLOGIC ABO: CPT | Performed by: OBSTETRICS & GYNECOLOGY

## 2020-10-19 PROCEDURE — 36415 COLL VENOUS BLD VENIPUNCTURE: CPT | Performed by: OBSTETRICS & GYNECOLOGY

## 2020-10-19 PROCEDURE — 85027 COMPLETE CBC AUTOMATED: CPT | Performed by: OBSTETRICS & GYNECOLOGY

## 2020-10-19 PROCEDURE — 86780 TREPONEMA PALLIDUM: CPT | Mod: 90 | Performed by: OBSTETRICS & GYNECOLOGY

## 2020-10-19 PROCEDURE — 86850 RBC ANTIBODY SCREEN: CPT | Performed by: OBSTETRICS & GYNECOLOGY

## 2020-10-19 PROCEDURE — U0003 INFECTIOUS AGENT DETECTION BY NUCLEIC ACID (DNA OR RNA); SEVERE ACUTE RESPIRATORY SYNDROME CORONAVIRUS 2 (SARS-COV-2) (CORONAVIRUS DISEASE [COVID-19]), AMPLIFIED PROBE TECHNIQUE, MAKING USE OF HIGH THROUGHPUT TECHNOLOGIES AS DESCRIBED BY CMS-2020-01-R: HCPCS | Performed by: OBSTETRICS & GYNECOLOGY

## 2020-10-20 LAB
SARS-COV-2 RNA SPEC QL NAA+PROBE: NOT DETECTED
SPECIMEN SOURCE: NORMAL
T PALLIDUM AB SER QL: NONREACTIVE

## 2020-10-21 ENCOUNTER — OFFICE VISIT (OUTPATIENT)
Dept: MATERNAL FETAL MEDICINE | Facility: CLINIC | Age: 40
End: 2020-10-21
Attending: OBSTETRICS & GYNECOLOGY
Payer: COMMERCIAL

## 2020-10-21 ENCOUNTER — HOSPITAL ENCOUNTER (OUTPATIENT)
Dept: ULTRASOUND IMAGING | Facility: CLINIC | Age: 40
End: 2020-10-21
Attending: OBSTETRICS & GYNECOLOGY
Payer: COMMERCIAL

## 2020-10-21 ENCOUNTER — APPOINTMENT (OUTPATIENT)
Dept: LAB | Facility: CLINIC | Age: 40
End: 2020-10-21
Payer: COMMERCIAL

## 2020-10-21 ENCOUNTER — ANESTHESIA EVENT (OUTPATIENT)
Dept: OBGYN | Facility: CLINIC | Age: 40
End: 2020-10-21
Payer: COMMERCIAL

## 2020-10-21 DIAGNOSIS — O24.113 PREGNANCY COMPLICATED BY PRE-EXISTING TYPE 2 DIABETES IN THIRD TRIMESTER: ICD-10-CM

## 2020-10-21 DIAGNOSIS — O10.919 CHRONIC HYPERTENSION DURING PREGNANCY, ANTEPARTUM: Primary | ICD-10-CM

## 2020-10-21 DIAGNOSIS — O10.919 CHRONIC HYPERTENSION DURING PREGNANCY, ANTEPARTUM: ICD-10-CM

## 2020-10-21 PROCEDURE — T1013 SIGN LANG/ORAL INTERPRETER: HCPCS | Mod: U3

## 2020-10-21 PROCEDURE — 99207 PR NO CHARGE LOS: CPT | Performed by: OBSTETRICS & GYNECOLOGY

## 2020-10-21 PROCEDURE — 76819 FETAL BIOPHYS PROFIL W/O NST: CPT | Mod: 26 | Performed by: OBSTETRICS & GYNECOLOGY

## 2020-10-21 PROCEDURE — 76819 FETAL BIOPHYS PROFIL W/O NST: CPT

## 2020-10-21 NOTE — ANESTHESIA PREPROCEDURE EVALUATION
"Anesthesia Pre-Procedure Evaluation    Patient: Gloria Escobar   MRN:     [id number] Gender:   female   Age:    40 year old :      1980        Preoperative Diagnosis: Chronic hypertension affecting pregnancy [O10.919]  Transverse lie of fetus, single or unspecified fetus [O32.2XX0]   Procedure(s):   SECTION     HPI:  41 yo  at 38w3d presenting for primary c/s for transverse lie.  PMH of DM2, HTN, obesity.  GBS+    LABS:  CBC:   Lab Results   Component Value Date    WBC 8.2 10/19/2020    WBC 8.2 10/14/2020    HGB 11.2 (L) 10/19/2020    HGB 11.8 10/14/2020    HCT 32.9 (L) 10/19/2020    HCT 34.6 (L) 10/14/2020     10/19/2020     10/14/2020     BMP:   Lab Results   Component Value Date     2018     2007    POTASSIUM 4.0 2018    POTASSIUM 3.9 2007    CHLORIDE 103 2018    CHLORIDE 111 (H) 2007    CO2 24 2018    CO2 19 (L) 2007    BUN 14 2018    BUN 9 2007    CR 0.72 10/14/2020    CR 0.67 10/09/2020     (H) 2018    GLC 82 2007     COAGS: No results found for: PTT, INR, FIBR  POC: No results found for: BGM, HCG, HCGS  OTHER:   Lab Results   Component Value Date    A1C 5.5 10/09/2020    SWATHI 8.7 2018    ALT 15 10/14/2020    AST 16 10/14/2020    TSH 1.25 2020        Preop Vitals    BP Readings from Last 3 Encounters:   10/14/20 (!) 155/85   10/09/20 (!) 146/81   20 131/80    Pulse Readings from Last 3 Encounters:   10/14/20 54   10/09/20 65   20 67      Resp Readings from Last 3 Encounters:   18 16    SpO2 Readings from Last 3 Encounters:   20 97%   18 98%      Temp Readings from Last 1 Encounters:   10/14/20 36.3  C (97.4  F) (Oral)    Ht Readings from Last 1 Encounters:   10/09/20 1.689 m (5' 6.5\")      Wt Readings from Last 1 Encounters:   10/14/20 93.9 kg (207 lb)    Estimated body mass index is 32.91 kg/m  as calculated from the " "following:    Height as of 10/9/20: 1.689 m (5' 6.5\").    Weight as of 10/14/20: 93.9 kg (207 lb).     Current Outpatient Medications   Medication Sig Dispense Refill     acetaminophen (TYLENOL) 325 MG tablet Take 2 tablets (650 mg) by mouth every 6 hours as needed for mild pain Start after Delivery. 100 tablet 0     acetone urine (KETOSTIX) test strip Test first urine of every morning for 1 week. If consistently negative, test once weekly. 50 each 3     aspirin (ASA) 81 MG EC tablet Take 1 tablet (81 mg) by mouth daily 100 tablet 3     blood glucose (ACCU-CHEK GUIDE) test strip Use to test blood sugar 4 times daily (before breakfast and 1 hours after start of each meal). 150 strip 6     blood glucose (NO BRAND SPECIFIED) test strip Use to test blood sugar 4 times daily or as directed. 100 each 4     blood glucose monitoring (ACCU-CHEK FASTCLIX) lancets Use to check blood glucose 4 times daily 204 each 6     Blood Glucose Monitoring Suppl (ACCU-CHEK GUIDE ME) w/Device KIT 1 Device daily 1 kit 0     ibuprofen (ADVIL/MOTRIN) 600 MG tablet Take 1 tablet (600 mg) by mouth every 6 hours as needed for moderate pain Start after delivery 60 tablet 0     insulin glargine (LANTUS SOLOSTAR) 100 UNIT/ML pen Inject 18 Units Subcutaneous every morning 15 mL 3     insulin pen needle (31G X 5 MM) 31G X 5 MM miscellaneous Use 4 pen needles daily or as directed. 200 each 3     metFORMIN (GLUCOPHAGE-XR) 500 MG 24 hr tablet TK 2 TS PO BID       NIFEdipine ER OSMOTIC (PROCARDIA XL) 30 MG 24 hr tablet Take 1 tablet (30 mg) by mouth daily 90 tablet 1     NOVOLOG FLEXPEN 100 UNIT/ML soln Inject 4 units with breakfast, 5 units before lunch and dinner. 15 mL 3     Prenatal Vit-Fe Fumarate-FA (PRENATAL VITAMIN) 27-0.8 MG TABS Take 1 tablet by mouth daily 100 tablet 3     senna-docusate (SENOKOT-S/PERICOLACE) 8.6-50 MG tablet Take 1 tablet by mouth daily Start after delivery. 100 tablet 0        LDA:  Peripheral IV 04/24/18 Left Upper forearm " (Active)   Number of days: 911        Past Medical History:   Diagnosis Date     Hypertension      Type 2 diabetes mellitus (H) .      Past Surgical History:   Procedure Laterality Date     cholesetomy        No Known Allergies     Anesthesia Evaluation     . Pt has had prior anesthetic. Type: General    No history of anesthetic complications          ROS/MED HX    ENT/Pulmonary:  - neg pulmonary ROS     Neurologic:  - neg neurologic ROS     Cardiovascular:     (+) hypertension (on nifedepine )----. : . . . :. .       METS/Exercise Tolerance:     Hematologic: Comments: Hgb 11.2, Plt 238 - neg hematologic  ROS       Musculoskeletal:  - neg musculoskeletal ROS       GI/Hepatic:     (+) cholecystitis/cholelithiasis,       Renal/Genitourinary:  - ROS Renal section negative       Endo:     (+) type II DM (on insulin and metformin ) .      Psychiatric:  - neg psychiatric ROS       Infectious Disease:  - neg infectious disease ROS (+) Other Infectious Disease GBS+      Malignancy:      - no malignancy   Other:    (+) no H/O Chronic Pain,                       PHYSICAL EXAM:   Mental Status/Neuro: A/A/O   Airway: Facies: Feasible  Mallampati: II  Mouth/Opening: Full  TM distance: > 6 cm  Neck ROM: Full   Respiratory: Auscultation: CTAB     Resp. Rate: Normal     Resp. Effort: Normal      CV: Rhythm: Regular  Rate: Age appropriate  Heart: Normal Sounds  Edema: None   Comments:      Dental: Normal Dentition                Assessment:   ASA SCORE: 2    H&P: History and physical reviewed and following examination; no interval change.   Smoking Status:  Non-Smoker/Unknown   NPO Status: NPO Appropriate     Plan:   Anes. Type:  MAC; Spinal; Peripheral Nerve Block; For Post-op pain in coordination with surgeon     Block Details: TAP; Exparel; Single Shot   Pre-Medication: None   Induction:  N/a   Airway: Native Airway   Access/Monitoring: PIV   Maintenance: N/a     Postop Plan:   Postop Pain: Regional; Opioids; NSAID  Postop  Sedation/Airway: Not planned  Disposition: Inpatient/Admit     PONV Management:   Adult Risk Factors: Female, Non-Smoker, Postop Opioids   Prevention: Ondansetron, Dexamethasone     CONSENT: Direct conversation; Via    Plan and risks discussed with: Patient   Blood Products: Consented (ALL Blood Products)       Comments for Plan/Consent:  40 y.o  presenting for scheduled C/S 2/ to breech position. Pregnancy complicated by cHTN, T2DM, AMA, LGA.    Discussed risks of anesthesia including nausea, vomiting, headache, itching, bleeding, infection, cardiopulmonary complications, neurologic complications, and serious complications.                   Timothy Sanders III, MD

## 2020-10-21 NOTE — H&P
L&D History and Physical   2020  Gloria Escobar  [id number]    ** Interview conducted with aid of Estonian ipad  due to language barrier **    HPI: Gloria Escobar is a 40 year old  at 38w4d by 10w3d US who presents for scheduled CS for transverse position. Pregnancy complicated by chronic hypertension and type 2 diabetes.    She states that she is feeling well today.  She denies headache, vision changes, chest pain, shortness of breath, fever, chills, nausea, vomiting or other systemic complaints. She denies vaginal bleeding or loss of fluid and is feeling normal fetal movement.      Her pregnancy is notable for:  - CHTN - on Nifedipine 30; Lisinopril prior to pregnancy; Baseline HELLP labs wnl x3, UPC <0.05 > 0.22 > 0.24  - T2DM - Hgb A1c 8.5% --> 5.5%; Lantus 18U AM, Aspart ; On metformin prior to pregnancy  - Suspected LGA, EFW 3589 (82%) with AC>96% AC > HC  - GBS+  - AMA  - Obesity, BMI 33.5    ROS: No headaches, vision changes, nausea, vomiting, fevers, chills, chest pain, SOB, no abdominal pain, constipation, diarrhea, dysuria, changes in vaginal discharge or edema in extremities noted.     OBHX:   OB History    Para Term  AB Living   4 3 3 0 0 3   SAB TAB Ectopic Multiple Live Births   0 0 0 0 3      # Outcome Date GA Lbr Joao/2nd Weight Sex Delivery Anes PTL Lv   4 Current            3 Term 10/22/09 39w0d  3.629 kg (8 lb) F    BREANNE   2 Term 07 39w0d  3.629 kg (8 lb) F   N BREANNE   1 Term 04 39w0d  3.629 kg (8 lb) F   N BREANNE       Past Medical History:   Diagnosis Date     Hypertension      Type 2 diabetes mellitus (H) .       Past Surgical History:   Procedure Laterality Date     CHOLECYSTECTOMY       cholesetomy         Medications:   No current facility-administered medications on file prior to encounter.        acetone urine (KETOSTIX) test strip, Test first urine of every morning for 1 week. If consistently  negative, test once weekly.       aspirin (ASA) 81 MG EC tablet, Take 1 tablet (81 mg) by mouth daily       blood glucose (ACCU-CHEK GUIDE) test strip, Use to test blood sugar 4 times daily (before breakfast and 1 hours after start of each meal).       blood glucose (NO BRAND SPECIFIED) test strip, Use to test blood sugar 4 times daily or as directed.       blood glucose monitoring (ACCU-CHEK FASTCLIX) lancets, Use to check blood glucose 4 times daily       Blood Glucose Monitoring Suppl (ACCU-CHEK GUIDE ME) w/Device KIT, 1 Device daily       insulin glargine (LANTUS SOLOSTAR) 100 UNIT/ML pen, Inject 18 Units Subcutaneous every morning       insulin pen needle (31G X 5 MM) 31G X 5 MM miscellaneous, Use 4 pen needles daily or as directed.       metFORMIN (GLUCOPHAGE-XR) 500 MG 24 hr tablet, TK 2 TS PO BID       NIFEdipine ER OSMOTIC (PROCARDIA XL) 30 MG 24 hr tablet, Take 1 tablet (30 mg) by mouth daily       NOVOLOG FLEXPEN 100 UNIT/ML soln, Inject 4 units with breakfast, 5 units before lunch and dinner.       Prenatal Vit-Fe Fumarate-FA (PRENATAL VITAMIN) 27-0.8 MG TABS, Take 1 tablet by mouth daily       acetaminophen (TYLENOL) 325 MG tablet, Take 2 tablets (650 mg) by mouth every 6 hours as needed for mild pain Start after Delivery.       ibuprofen (ADVIL/MOTRIN) 600 MG tablet, Take 1 tablet (600 mg) by mouth every 6 hours as needed for moderate pain Start after delivery       senna-docusate (SENOKOT-S/PERICOLACE) 8.6-50 MG tablet, Take 1 tablet by mouth daily Start after delivery.        No Known Allergies    Family History   Problem Relation Age of Onset     Diabetes Mother         type 2      Thyroid Cancer Mother      Diabetes Father         type 2       SocialHX:   Social History     Tobacco Use     Smoking status: Never Smoker     Smokeless tobacco: Never Used   Substance Use Topics     Alcohol use: No     Drug use: No       ROS: 10-point ROS negative except as indicated in HPI.    Physical Exam:  Vitals:     10/22/20 0700   BP: (!) 140/94   Resp: 16   Temp: 98.8  F (37.1  C)   TempSrc: Oral     General: alert, oriented female, resting in bed in NAD  CV: regular rate and rhythm   Lungs: clear bilaterally, no crackles or wheezes.   Abdomen: soft, gravid, non-tender, EFW 8.5#.  Extremities: bilateral lower extremities non-tender with trace edema    SVE: closed, very high, posterior and difficult to reach  Membranes: Intact    Presentation: Cephalic/slightly oblique by BUSUS    FHT: baseline 135, mod variability, accelerations present, decelerations absent, intermittent loss of tracing  [place]: no contractions in ten minutes      Prenatal Labs:   Lab Results   Component Value Date    ABO A 10/19/2020    RH Pos 10/19/2020    AS Neg 10/19/2020    HEPBANG Nonreactive 2020    HGB 11.2 (L) 10/19/2020       GBS Status:   Lab Results   Component Value Date    GBS Positive (A) 10/09/2020       No results found for: PAP    Labs:   Patient Vitals for the past 24 hrs:   BP Temp Temp src Resp   10/22/20 0700 (!) 140/94 98.8  F (37.1  C) Oral 16       Assessment: 40 year old  at 38w3d by 10w US, here for scheduled CS. Pregnancy complicated by unstable fetal lie, chronic hypertension on nifedipine, type 2 DM on insulin and advanced maternal age  Fetus is cephalic today so discussed option of induction of labor versus proceeding with scheduled CS. Discussed induction process with patient, counseled her it could take a few days because fetal head is high and cervix unfavorable. Discussed risk of shoulder dystocia. Discussed surgery, procedure, risks and recovery. Patient prefers delivery by  section. Discussed risks of  section. Risks include but are not limited to infection that may need antibiotics to treat, injury to other organs, risk of excessive blood loss and potential need for blood transfusion. Patient consents to transfusion of blood products if indicated. Discussed risks of anesthesia and DVT.  Verbal and written consent obtained.     Anterior placenta. BMI 32. EFW 8.5lb.       Plan:    #-PNC:     -Rh positive, Rubella immune  - GBS positive. Covered by steph-operative antibiotics.   -Placenta: anteroir    # section:  -Admitted to L&D for scheduled primary  section.  -Consent obtained: The risks, benefits, and alternatives of  section were discussed, including the risks of bleeding, infection, injury to surrounding organs, fetal injury, and remote risks of hysterectomy. She consented to a blood transfusion in the event of a life threatening amount of bleeding. She had time to ask questions and agreed to proceed. Surgical consent was signed. Blood transfusion consent signed.   -Labs: CBC, T&S  -Fen/GI: NPO, IVFs, Crystal. Plan ERAS recovery  -Pain: Spinal per anesthesia.   -ID: Ancef for steph-op antibiotics.  -PPX: SCDs prior to surgery     #Fetal Well Being  -Category I FHT.  Continue EFM.     #Postpartum planning  -Plans:   --Feeding: breast  --Contraception: undecided       # Type 2 DM.   - BG 55, will give D5 in fluids  - postpartum endocrine consult    # chronic htn.  - continue nifedipine XL 30mg   - monitor for s/sx superimposed pre-E    Geraldine Ferro MD

## 2020-10-22 ENCOUNTER — HOSPITAL ENCOUNTER (INPATIENT)
Facility: CLINIC | Age: 40
LOS: 4 days | Discharge: HOME | End: 2020-10-26
Attending: OBSTETRICS & GYNECOLOGY | Admitting: OBSTETRICS & GYNECOLOGY
Payer: COMMERCIAL

## 2020-10-22 ENCOUNTER — ANESTHESIA (OUTPATIENT)
Dept: OBGYN | Facility: CLINIC | Age: 40
End: 2020-10-22
Payer: COMMERCIAL

## 2020-10-22 ENCOUNTER — ALLIED HEALTH/NURSE VISIT (OUTPATIENT)
Dept: INTERPRETER SERVICES | Facility: CLINIC | Age: 40
End: 2020-10-22
Payer: COMMERCIAL

## 2020-10-22 DIAGNOSIS — O10.919 CHRONIC HYPERTENSION AFFECTING PREGNANCY: ICD-10-CM

## 2020-10-22 DIAGNOSIS — O09.522 MULTIGRAVIDA OF ADVANCED MATERNAL AGE IN SECOND TRIMESTER: ICD-10-CM

## 2020-10-22 DIAGNOSIS — O32.2XX0 TRANSVERSE LIE OF FETUS, SINGLE OR UNSPECIFIED FETUS: ICD-10-CM

## 2020-10-22 DIAGNOSIS — O09.93 HIGH-RISK PREGNANCY IN THIRD TRIMESTER: ICD-10-CM

## 2020-10-22 DIAGNOSIS — Z79.4 TYPE 2 DIABETES MELLITUS WITHOUT COMPLICATION, WITH LONG-TERM CURRENT USE OF INSULIN (H): ICD-10-CM

## 2020-10-22 DIAGNOSIS — E11.9 TYPE 2 DIABETES MELLITUS WITHOUT COMPLICATION, WITH LONG-TERM CURRENT USE OF INSULIN (H): ICD-10-CM

## 2020-10-22 DIAGNOSIS — Z98.891 STATUS POST CESAREAN SECTION: Primary | ICD-10-CM

## 2020-10-22 LAB
ALT SERPL W P-5'-P-CCNC: 13 U/L (ref 0–50)
AST SERPL W P-5'-P-CCNC: 15 U/L (ref 0–45)
CREAT SERPL-MCNC: 0.49 MG/DL (ref 0.52–1.04)
GFR SERPL CREATININE-BSD FRML MDRD: >90 ML/MIN/{1.73_M2}
GLUCOSE BLDC GLUCOMTR-MCNC: 109 MG/DL (ref 70–99)
GLUCOSE BLDC GLUCOMTR-MCNC: 121 MG/DL (ref 70–99)
GLUCOSE BLDC GLUCOMTR-MCNC: 55 MG/DL (ref 70–99)
GLUCOSE BLDC GLUCOMTR-MCNC: 68 MG/DL (ref 70–99)
GLUCOSE BLDC GLUCOMTR-MCNC: 68 MG/DL (ref 70–99)
GLUCOSE BLDC GLUCOMTR-MCNC: 69 MG/DL (ref 70–99)
GLUCOSE BLDC GLUCOMTR-MCNC: 79 MG/DL (ref 70–99)
GLUCOSE BLDC GLUCOMTR-MCNC: 95 MG/DL (ref 70–99)

## 2020-10-22 PROCEDURE — 84460 ALANINE AMINO (ALT) (SGPT): CPT | Performed by: OBSTETRICS & GYNECOLOGY

## 2020-10-22 PROCEDURE — 250N000013 HC RX MED GY IP 250 OP 250 PS 637: Performed by: STUDENT IN AN ORGANIZED HEALTH CARE EDUCATION/TRAINING PROGRAM

## 2020-10-22 PROCEDURE — 250N000009 HC RX 250: Performed by: STUDENT IN AN ORGANIZED HEALTH CARE EDUCATION/TRAINING PROGRAM

## 2020-10-22 PROCEDURE — 250N000009 HC RX 250: Performed by: OBSTETRICS & GYNECOLOGY

## 2020-10-22 PROCEDURE — 120N000002 HC R&B MED SURG/OB UMMC

## 2020-10-22 PROCEDURE — 761N000004 HC RECOVERY PHASE 1 LEVEL 2 EA ADDTL HR: Performed by: OBSTETRICS & GYNECOLOGY

## 2020-10-22 PROCEDURE — 250N000013 HC RX MED GY IP 250 OP 250 PS 637

## 2020-10-22 PROCEDURE — 258N000003 HC RX IP 258 OP 636

## 2020-10-22 PROCEDURE — 999N000139 HC STATISTIC PRE-PROCEDURE ASSESSMENT II: Performed by: OBSTETRICS & GYNECOLOGY

## 2020-10-22 PROCEDURE — 250N000011 HC RX IP 250 OP 636: Performed by: STUDENT IN AN ORGANIZED HEALTH CARE EDUCATION/TRAINING PROGRAM

## 2020-10-22 PROCEDURE — 370N000001 HC ANESTHESIA TECHNICAL FEE, 1ST 30 MIN: Performed by: OBSTETRICS & GYNECOLOGY

## 2020-10-22 PROCEDURE — 36415 COLL VENOUS BLD VENIPUNCTURE: CPT | Performed by: OBSTETRICS & GYNECOLOGY

## 2020-10-22 PROCEDURE — 370N000002 HC ANESTHESIA TECHNICAL FEE, EACH ADDTL 15 MIN: Performed by: OBSTETRICS & GYNECOLOGY

## 2020-10-22 PROCEDURE — 84450 TRANSFERASE (AST) (SGOT): CPT | Performed by: OBSTETRICS & GYNECOLOGY

## 2020-10-22 PROCEDURE — 250N000011 HC RX IP 250 OP 636

## 2020-10-22 PROCEDURE — 258N000003 HC RX IP 258 OP 636: Performed by: STUDENT IN AN ORGANIZED HEALTH CARE EDUCATION/TRAINING PROGRAM

## 2020-10-22 PROCEDURE — 250N000011 HC RX IP 250 OP 636: Performed by: OBSTETRICS & GYNECOLOGY

## 2020-10-22 PROCEDURE — 59515 CESAREAN DELIVERY: CPT | Mod: GC | Performed by: OBSTETRICS & GYNECOLOGY

## 2020-10-22 PROCEDURE — 271N000001 HC OR GENERAL SUPPLY NON-STERILE: Performed by: OBSTETRICS & GYNECOLOGY

## 2020-10-22 PROCEDURE — 360N000022 HC SURGERY LEVEL 3 1ST 30 MIN - UMMC: Performed by: OBSTETRICS & GYNECOLOGY

## 2020-10-22 PROCEDURE — 999N001017 HC STATISTIC GLUCOSE BY METER IP

## 2020-10-22 PROCEDURE — C9290 INJ, BUPIVACAINE LIPOSOME: HCPCS | Performed by: STUDENT IN AN ORGANIZED HEALTH CARE EDUCATION/TRAINING PROGRAM

## 2020-10-22 PROCEDURE — 761N000003 HC RECOVERY PHASE 1 LEVEL 2 FIRST HR: Performed by: OBSTETRICS & GYNECOLOGY

## 2020-10-22 PROCEDURE — 250N000013 HC RX MED GY IP 250 OP 250 PS 637: Performed by: OBSTETRICS & GYNECOLOGY

## 2020-10-22 PROCEDURE — 360N000023 HC SURGERY LEVEL 3 EA 15 ADDTL MIN UMMC: Performed by: OBSTETRICS & GYNECOLOGY

## 2020-10-22 PROCEDURE — 82565 ASSAY OF CREATININE: CPT | Performed by: OBSTETRICS & GYNECOLOGY

## 2020-10-22 PROCEDURE — 272N000001 HC OR GENERAL SUPPLY STERILE: Performed by: OBSTETRICS & GYNECOLOGY

## 2020-10-22 PROCEDURE — T1013 SIGN LANG/ORAL INTERPRETER: HCPCS | Mod: U3,TEL

## 2020-10-22 RX ORDER — NALOXONE HYDROCHLORIDE 0.4 MG/ML
.1-.4 INJECTION, SOLUTION INTRAMUSCULAR; INTRAVENOUS; SUBCUTANEOUS
Status: DISCONTINUED | OUTPATIENT
Start: 2020-10-22 | End: 2020-10-26 | Stop reason: HOSPADM

## 2020-10-22 RX ORDER — MORPHINE SULFATE 1 MG/ML
INJECTION, SOLUTION EPIDURAL; INTRATHECAL; INTRAVENOUS PRN
Status: DISCONTINUED | OUTPATIENT
Start: 2020-10-22 | End: 2020-10-22

## 2020-10-22 RX ORDER — ACETAMINOPHEN 325 MG/1
975 TABLET ORAL EVERY 6 HOURS
Status: DISCONTINUED | OUTPATIENT
Start: 2020-10-22 | End: 2020-10-26 | Stop reason: HOSPADM

## 2020-10-22 RX ORDER — OXYTOCIN 10 [USP'U]/ML
10 INJECTION, SOLUTION INTRAMUSCULAR; INTRAVENOUS
Status: DISCONTINUED | OUTPATIENT
Start: 2020-10-22 | End: 2020-10-26 | Stop reason: HOSPADM

## 2020-10-22 RX ORDER — HYDRALAZINE HYDROCHLORIDE 20 MG/ML
5 INJECTION INTRAMUSCULAR; INTRAVENOUS
Status: DISCONTINUED | OUTPATIENT
Start: 2020-10-22 | End: 2020-10-26 | Stop reason: HOSPADM

## 2020-10-22 RX ORDER — ONDANSETRON 2 MG/ML
4 INJECTION INTRAMUSCULAR; INTRAVENOUS EVERY 6 HOURS PRN
Status: DISCONTINUED | OUTPATIENT
Start: 2020-10-22 | End: 2020-10-26 | Stop reason: HOSPADM

## 2020-10-22 RX ORDER — BUPIVACAINE HYDROCHLORIDE 2.5 MG/ML
INJECTION, SOLUTION EPIDURAL; INFILTRATION; INTRACAUDAL PRN
Status: DISCONTINUED | OUTPATIENT
Start: 2020-10-22 | End: 2020-10-22

## 2020-10-22 RX ORDER — LIDOCAINE 40 MG/G
CREAM TOPICAL
Status: DISCONTINUED | OUTPATIENT
Start: 2020-10-22 | End: 2020-10-26 | Stop reason: HOSPADM

## 2020-10-22 RX ORDER — KETOROLAC TROMETHAMINE 30 MG/ML
30 INJECTION, SOLUTION INTRAMUSCULAR; INTRAVENOUS EVERY 6 HOURS
Status: COMPLETED | OUTPATIENT
Start: 2020-10-22 | End: 2020-10-23

## 2020-10-22 RX ORDER — NALOXONE HYDROCHLORIDE 0.4 MG/ML
.1-.4 INJECTION, SOLUTION INTRAMUSCULAR; INTRAVENOUS; SUBCUTANEOUS
Status: DISCONTINUED | OUTPATIENT
Start: 2020-10-22 | End: 2020-10-22

## 2020-10-22 RX ORDER — AMOXICILLIN 250 MG
1 CAPSULE ORAL 2 TIMES DAILY
Status: DISCONTINUED | OUTPATIENT
Start: 2020-10-22 | End: 2020-10-26 | Stop reason: HOSPADM

## 2020-10-22 RX ORDER — OXYTOCIN/0.9 % SODIUM CHLORIDE 30/500 ML
340 PLASTIC BAG, INJECTION (ML) INTRAVENOUS CONTINUOUS PRN
Status: DISCONTINUED | OUTPATIENT
Start: 2020-10-22 | End: 2020-10-26 | Stop reason: HOSPADM

## 2020-10-22 RX ORDER — HYDRALAZINE HYDROCHLORIDE 20 MG/ML
10 INJECTION INTRAMUSCULAR; INTRAVENOUS
Status: DISCONTINUED | OUTPATIENT
Start: 2020-10-22 | End: 2020-10-26 | Stop reason: HOSPADM

## 2020-10-22 RX ORDER — LORAZEPAM 2 MG/ML
2 INJECTION INTRAMUSCULAR
Status: DISCONTINUED | OUTPATIENT
Start: 2020-10-22 | End: 2020-10-22

## 2020-10-22 RX ORDER — EPHEDRINE SULFATE 50 MG/ML
5 INJECTION, SOLUTION INTRAMUSCULAR; INTRAVENOUS; SUBCUTANEOUS
Status: DISCONTINUED | OUTPATIENT
Start: 2020-10-22 | End: 2020-10-26 | Stop reason: HOSPADM

## 2020-10-22 RX ORDER — NIFEDIPINE 30 MG/1
30 TABLET, EXTENDED RELEASE ORAL DAILY
Status: DISCONTINUED | OUTPATIENT
Start: 2020-10-22 | End: 2020-10-22

## 2020-10-22 RX ORDER — SODIUM CHLORIDE, SODIUM LACTATE, POTASSIUM CHLORIDE, CALCIUM CHLORIDE 600; 310; 30; 20 MG/100ML; MG/100ML; MG/100ML; MG/100ML
INJECTION, SOLUTION INTRAVENOUS CONTINUOUS PRN
Status: DISCONTINUED | OUTPATIENT
Start: 2020-10-22 | End: 2020-10-22

## 2020-10-22 RX ORDER — MAGNESIUM HYDROXIDE 1200 MG/15ML
LIQUID ORAL PRN
Status: DISCONTINUED | OUTPATIENT
Start: 2020-10-22 | End: 2020-10-26 | Stop reason: HOSPADM

## 2020-10-22 RX ORDER — CALCIUM GLUCONATE 94 MG/ML
1 INJECTION, SOLUTION INTRAVENOUS
Status: DISCONTINUED | OUTPATIENT
Start: 2020-10-22 | End: 2020-10-26 | Stop reason: HOSPADM

## 2020-10-22 RX ORDER — MAGNESIUM SULFATE HEPTAHYDRATE 40 MG/ML
2 INJECTION, SOLUTION INTRAVENOUS
Status: DISCONTINUED | OUTPATIENT
Start: 2020-10-22 | End: 2020-10-22

## 2020-10-22 RX ORDER — CARBOPROST TROMETHAMINE 250 UG/ML
250 INJECTION, SOLUTION INTRAMUSCULAR
Status: DISCONTINUED | OUTPATIENT
Start: 2020-10-22 | End: 2020-10-26 | Stop reason: HOSPADM

## 2020-10-22 RX ORDER — CEFAZOLIN SODIUM 1 G/3ML
1 INJECTION, POWDER, FOR SOLUTION INTRAMUSCULAR; INTRAVENOUS SEE ADMIN INSTRUCTIONS
Status: DISCONTINUED | OUTPATIENT
Start: 2020-10-22 | End: 2020-10-22

## 2020-10-22 RX ORDER — BISACODYL 10 MG
10 SUPPOSITORY, RECTAL RECTAL DAILY PRN
Status: DISCONTINUED | OUTPATIENT
Start: 2020-10-24 | End: 2020-10-26 | Stop reason: HOSPADM

## 2020-10-22 RX ORDER — DIPHENHYDRAMINE HCL 25 MG
25 CAPSULE ORAL EVERY 6 HOURS PRN
Status: DISCONTINUED | OUTPATIENT
Start: 2020-10-22 | End: 2020-10-26 | Stop reason: HOSPADM

## 2020-10-22 RX ORDER — LABETALOL 20 MG/4 ML (5 MG/ML) INTRAVENOUS SYRINGE
80 EVERY 10 MIN PRN
Status: DISCONTINUED | OUTPATIENT
Start: 2020-10-22 | End: 2020-10-22

## 2020-10-22 RX ORDER — SIMETHICONE 80 MG
80 TABLET,CHEWABLE ORAL 4 TIMES DAILY PRN
Status: DISCONTINUED | OUTPATIENT
Start: 2020-10-22 | End: 2020-10-26 | Stop reason: HOSPADM

## 2020-10-22 RX ORDER — FENTANYL CITRATE 50 UG/ML
INJECTION, SOLUTION INTRAMUSCULAR; INTRAVENOUS PRN
Status: DISCONTINUED | OUTPATIENT
Start: 2020-10-22 | End: 2020-10-22

## 2020-10-22 RX ORDER — DEXTROSE MONOHYDRATE 25 G/50ML
25-50 INJECTION, SOLUTION INTRAVENOUS
Status: DISCONTINUED | OUTPATIENT
Start: 2020-10-22 | End: 2020-10-26 | Stop reason: HOSPADM

## 2020-10-22 RX ORDER — MAGNESIUM SULFATE IN WATER 40 MG/ML
2 INJECTION, SOLUTION INTRAVENOUS CONTINUOUS
Status: DISCONTINUED | OUTPATIENT
Start: 2020-10-22 | End: 2020-10-23

## 2020-10-22 RX ORDER — ONDANSETRON 2 MG/ML
INJECTION INTRAMUSCULAR; INTRAVENOUS PRN
Status: DISCONTINUED | OUTPATIENT
Start: 2020-10-22 | End: 2020-10-22

## 2020-10-22 RX ORDER — DEXTROSE, SODIUM CHLORIDE, SODIUM LACTATE, POTASSIUM CHLORIDE, AND CALCIUM CHLORIDE 5; .6; .31; .03; .02 G/100ML; G/100ML; G/100ML; G/100ML; G/100ML
INJECTION, SOLUTION INTRAVENOUS
Status: COMPLETED
Start: 2020-10-22 | End: 2020-10-22

## 2020-10-22 RX ORDER — NICOTINE POLACRILEX 4 MG
15-30 LOZENGE BUCCAL
Status: DISCONTINUED | OUTPATIENT
Start: 2020-10-22 | End: 2020-10-26 | Stop reason: HOSPADM

## 2020-10-22 RX ORDER — OXYTOCIN/0.9 % SODIUM CHLORIDE 30/500 ML
100 PLASTIC BAG, INJECTION (ML) INTRAVENOUS CONTINUOUS
Status: DISCONTINUED | OUTPATIENT
Start: 2020-10-22 | End: 2020-10-26 | Stop reason: HOSPADM

## 2020-10-22 RX ORDER — MAGNESIUM SULFATE HEPTAHYDRATE 40 MG/ML
INJECTION, SOLUTION INTRAVENOUS
Status: COMPLETED
Start: 2020-10-22 | End: 2020-10-22

## 2020-10-22 RX ORDER — LABETALOL 20 MG/4 ML (5 MG/ML) INTRAVENOUS SYRINGE
40 EVERY 10 MIN PRN
Status: DISCONTINUED | OUTPATIENT
Start: 2020-10-22 | End: 2020-10-22

## 2020-10-22 RX ORDER — MODIFIED LANOLIN
OINTMENT (GRAM) TOPICAL
Status: DISCONTINUED | OUTPATIENT
Start: 2020-10-22 | End: 2020-10-26 | Stop reason: HOSPADM

## 2020-10-22 RX ORDER — CITRIC ACID/SODIUM CITRATE 334-500MG
30 SOLUTION, ORAL ORAL
Status: COMPLETED | OUTPATIENT
Start: 2020-10-22 | End: 2020-10-22

## 2020-10-22 RX ORDER — MAGNESIUM SULFATE IN WATER 40 MG/ML
INJECTION, SOLUTION INTRAVENOUS
Status: DISCONTINUED
Start: 2020-10-22 | End: 2020-10-22 | Stop reason: HOSPADM

## 2020-10-22 RX ORDER — LIDOCAINE 40 MG/G
CREAM TOPICAL
Status: DISCONTINUED | OUTPATIENT
Start: 2020-10-22 | End: 2020-10-22

## 2020-10-22 RX ORDER — CITRIC ACID/SODIUM CITRATE 334-500MG
SOLUTION, ORAL ORAL
Status: COMPLETED
Start: 2020-10-22 | End: 2020-10-22

## 2020-10-22 RX ORDER — FENTANYL CITRATE-0.9 % NACL/PF 10 MCG/ML
PLASTIC BAG, INJECTION (ML) INTRAVENOUS CONTINUOUS PRN
Status: DISCONTINUED | OUTPATIENT
Start: 2020-10-22 | End: 2020-10-22

## 2020-10-22 RX ORDER — EPHEDRINE SULFATE 50 MG/ML
5 INJECTION, SOLUTION INTRAMUSCULAR; INTRAVENOUS; SUBCUTANEOUS
Status: DISCONTINUED | OUTPATIENT
Start: 2020-10-22 | End: 2020-10-22

## 2020-10-22 RX ORDER — DIPHENHYDRAMINE HYDROCHLORIDE 50 MG/ML
25 INJECTION INTRAMUSCULAR; INTRAVENOUS EVERY 6 HOURS PRN
Status: DISCONTINUED | OUTPATIENT
Start: 2020-10-22 | End: 2020-10-26 | Stop reason: HOSPADM

## 2020-10-22 RX ORDER — LABETALOL 20 MG/4 ML (5 MG/ML) INTRAVENOUS SYRINGE
20 EVERY 10 MIN PRN
Status: DISCONTINUED | OUTPATIENT
Start: 2020-10-22 | End: 2020-10-22

## 2020-10-22 RX ORDER — NALBUPHINE HYDROCHLORIDE 20 MG/ML
2.5-5 INJECTION, SOLUTION INTRAMUSCULAR; INTRAVENOUS; SUBCUTANEOUS EVERY 6 HOURS PRN
Status: DISCONTINUED | OUTPATIENT
Start: 2020-10-22 | End: 2020-10-26 | Stop reason: HOSPADM

## 2020-10-22 RX ORDER — HYDROCORTISONE 2.5 %
CREAM (GRAM) TOPICAL 3 TIMES DAILY PRN
Status: DISCONTINUED | OUTPATIENT
Start: 2020-10-22 | End: 2020-10-26 | Stop reason: HOSPADM

## 2020-10-22 RX ORDER — MAGNESIUM SULFATE HEPTAHYDRATE 500 MG/ML
4 INJECTION, SOLUTION INTRAMUSCULAR; INTRAVENOUS
Status: DISCONTINUED | OUTPATIENT
Start: 2020-10-22 | End: 2020-10-22

## 2020-10-22 RX ORDER — SODIUM CHLORIDE, SODIUM LACTATE, POTASSIUM CHLORIDE, CALCIUM CHLORIDE 600; 310; 30; 20 MG/100ML; MG/100ML; MG/100ML; MG/100ML
INJECTION, SOLUTION INTRAVENOUS
Status: DISCONTINUED
Start: 2020-10-22 | End: 2020-10-22 | Stop reason: HOSPADM

## 2020-10-22 RX ORDER — KETOROLAC TROMETHAMINE 30 MG/ML
INJECTION, SOLUTION INTRAMUSCULAR; INTRAVENOUS PRN
Status: DISCONTINUED | OUTPATIENT
Start: 2020-10-22 | End: 2020-10-22

## 2020-10-22 RX ORDER — NALBUPHINE HYDROCHLORIDE 20 MG/ML
2.5-5 INJECTION, SOLUTION INTRAMUSCULAR; INTRAVENOUS; SUBCUTANEOUS EVERY 6 HOURS PRN
Status: DISCONTINUED | OUTPATIENT
Start: 2020-10-22 | End: 2020-10-22

## 2020-10-22 RX ORDER — MAGNESIUM SULFATE HEPTAHYDRATE 40 MG/ML
4 INJECTION, SOLUTION INTRAVENOUS
Status: DISCONTINUED | OUTPATIENT
Start: 2020-10-22 | End: 2020-10-22

## 2020-10-22 RX ORDER — NIFEDIPINE 10 MG/1
10 CAPSULE ORAL
Status: DISCONTINUED | OUTPATIENT
Start: 2020-10-22 | End: 2020-10-26 | Stop reason: HOSPADM

## 2020-10-22 RX ORDER — OXYTOCIN/0.9 % SODIUM CHLORIDE 30/500 ML
PLASTIC BAG, INJECTION (ML) INTRAVENOUS CONTINUOUS PRN
Status: DISCONTINUED | OUTPATIENT
Start: 2020-10-22 | End: 2020-10-22

## 2020-10-22 RX ORDER — DEXTROSE, SODIUM CHLORIDE, SODIUM LACTATE, POTASSIUM CHLORIDE, AND CALCIUM CHLORIDE 5; .6; .31; .03; .02 G/100ML; G/100ML; G/100ML; G/100ML; G/100ML
INJECTION, SOLUTION INTRAVENOUS CONTINUOUS
Status: DISCONTINUED | OUTPATIENT
Start: 2020-10-22 | End: 2020-10-23 | Stop reason: CLARIF

## 2020-10-22 RX ORDER — MAGNESIUM SULFATE HEPTAHYDRATE 40 MG/ML
4 INJECTION, SOLUTION INTRAVENOUS ONCE
Status: COMPLETED | OUTPATIENT
Start: 2020-10-22 | End: 2020-10-22

## 2020-10-22 RX ORDER — BUPIVACAINE HYDROCHLORIDE 7.5 MG/ML
INJECTION, SOLUTION INTRASPINAL PRN
Status: DISCONTINUED | OUTPATIENT
Start: 2020-10-22 | End: 2020-10-22

## 2020-10-22 RX ORDER — OXYCODONE HYDROCHLORIDE 5 MG/1
5 TABLET ORAL EVERY 4 HOURS PRN
Status: DISCONTINUED | OUTPATIENT
Start: 2020-10-22 | End: 2020-10-26 | Stop reason: HOSPADM

## 2020-10-22 RX ORDER — SODIUM CHLORIDE, SODIUM LACTATE, POTASSIUM CHLORIDE, CALCIUM CHLORIDE 600; 310; 30; 20 MG/100ML; MG/100ML; MG/100ML; MG/100ML
10-125 INJECTION, SOLUTION INTRAVENOUS CONTINUOUS
Status: DISCONTINUED | OUTPATIENT
Start: 2020-10-22 | End: 2020-10-26 | Stop reason: HOSPADM

## 2020-10-22 RX ORDER — AMOXICILLIN 250 MG
2 CAPSULE ORAL 2 TIMES DAILY
Status: DISCONTINUED | OUTPATIENT
Start: 2020-10-22 | End: 2020-10-26 | Stop reason: HOSPADM

## 2020-10-22 RX ORDER — IBUPROFEN 800 MG/1
800 TABLET, FILM COATED ORAL EVERY 6 HOURS
Status: DISCONTINUED | OUTPATIENT
Start: 2020-10-23 | End: 2020-10-26 | Stop reason: HOSPADM

## 2020-10-22 RX ORDER — NIFEDIPINE 30 MG/1
30 TABLET, EXTENDED RELEASE ORAL DAILY
Status: DISCONTINUED | OUTPATIENT
Start: 2020-10-22 | End: 2020-10-23

## 2020-10-22 RX ORDER — SODIUM CHLORIDE, SODIUM LACTATE, POTASSIUM CHLORIDE, CALCIUM CHLORIDE 600; 310; 30; 20 MG/100ML; MG/100ML; MG/100ML; MG/100ML
INJECTION, SOLUTION INTRAVENOUS CONTINUOUS
Status: DISCONTINUED | OUTPATIENT
Start: 2020-10-22 | End: 2020-10-22

## 2020-10-22 RX ORDER — CEFAZOLIN SODIUM 2 G/100ML
2 INJECTION, SOLUTION INTRAVENOUS
Status: COMPLETED | OUTPATIENT
Start: 2020-10-22 | End: 2020-10-22

## 2020-10-22 RX ADMIN — SODIUM CHLORIDE, SODIUM LACTATE, POTASSIUM CHLORIDE, CALCIUM CHLORIDE, AND DEXTROSE MONOHYDRATE: 600; 310; 30; 20; 5 INJECTION, SOLUTION INTRAVENOUS at 08:06

## 2020-10-22 RX ADMIN — ACETAMINOPHEN 975 MG: 325 TABLET, FILM COATED ORAL at 20:32

## 2020-10-22 RX ADMIN — MAGNESIUM SULFATE HEPTAHYDRATE 4 G: 40 INJECTION, SOLUTION INTRAVENOUS at 15:30

## 2020-10-22 RX ADMIN — SODIUM CHLORIDE, POTASSIUM CHLORIDE, SODIUM LACTATE AND CALCIUM CHLORIDE 75 ML/HR: 600; 310; 30; 20 INJECTION, SOLUTION INTRAVENOUS at 19:32

## 2020-10-22 RX ADMIN — Medication 2 G: at 09:57

## 2020-10-22 RX ADMIN — SODIUM CHLORIDE, POTASSIUM CHLORIDE, SODIUM LACTATE AND CALCIUM CHLORIDE: 600; 310; 30; 20 INJECTION, SOLUTION INTRAVENOUS at 09:37

## 2020-10-22 RX ADMIN — MAGNESIUM SULFATE IN WATER 4 G: 40 INJECTION, SOLUTION INTRAVENOUS at 15:30

## 2020-10-22 RX ADMIN — SODIUM CITRATE AND CITRIC ACID MONOHYDRATE 30 ML: 500; 334 SOLUTION ORAL at 08:29

## 2020-10-22 RX ADMIN — BUPIVACAINE HYDROCHLORIDE IN DEXTROSE 1.6 ML: 7.5 INJECTION, SOLUTION SUBARACHNOID at 09:54

## 2020-10-22 RX ADMIN — FENTANYL CITRATE 10 MCG: 50 INJECTION, SOLUTION INTRAMUSCULAR; INTRAVENOUS at 09:54

## 2020-10-22 RX ADMIN — ONDANSETRON 4 MG: 2 INJECTION INTRAMUSCULAR; INTRAVENOUS at 19:25

## 2020-10-22 RX ADMIN — BUPIVACAINE HYDROCHLORIDE 20 ML: 2.5 INJECTION, SOLUTION EPIDURAL; INFILTRATION; INTRACAUDAL at 11:18

## 2020-10-22 RX ADMIN — MORPHINE SULFATE 0.15 MG: 1 INJECTION EPIDURAL; INTRATHECAL; INTRAVENOUS at 09:54

## 2020-10-22 RX ADMIN — OXYTOCIN-SODIUM CHLORIDE 0.9% IV SOLN 30 UNIT/500ML 300 ML/HR: 30-0.9/5 SOLUTION at 10:15

## 2020-10-22 RX ADMIN — ONDANSETRON 4 MG: 2 INJECTION INTRAMUSCULAR; INTRAVENOUS at 10:15

## 2020-10-22 RX ADMIN — KETOROLAC TROMETHAMINE 30 MG: 30 INJECTION, SOLUTION INTRAMUSCULAR; INTRAVENOUS at 23:44

## 2020-10-22 RX ADMIN — SODIUM CHLORIDE, POTASSIUM CHLORIDE, SODIUM LACTATE AND CALCIUM CHLORIDE: 600; 310; 30; 20 INJECTION, SOLUTION INTRAVENOUS at 08:05

## 2020-10-22 RX ADMIN — Medication 30 ML: at 08:29

## 2020-10-22 RX ADMIN — KETOROLAC TROMETHAMINE 30 MG: 30 INJECTION, SOLUTION INTRAMUSCULAR at 11:12

## 2020-10-22 RX ADMIN — MAGNESIUM SULFATE HEPTAHYDRATE 2 G/HR: 40 INJECTION, SOLUTION INTRAVENOUS at 16:03

## 2020-10-22 RX ADMIN — NIFEDIPINE 30 MG: 30 TABLET, FILM COATED, EXTENDED RELEASE ORAL at 15:15

## 2020-10-22 RX ADMIN — BUPIVACAINE 20 ML: 13.3 INJECTION, SUSPENSION, LIPOSOMAL INFILTRATION at 11:18

## 2020-10-22 RX ADMIN — Medication 100 ML/HR: at 13:02

## 2020-10-22 RX ADMIN — LABETALOL 20 MG/4 ML (5 MG/ML) INTRAVENOUS SYRINGE 20 MG: at 14:00

## 2020-10-22 RX ADMIN — Medication 50 MCG/MIN: at 09:54

## 2020-10-22 RX ADMIN — ACETAMINOPHEN 975 MG: 325 TABLET, FILM COATED ORAL at 13:12

## 2020-10-22 RX ADMIN — KETOROLAC TROMETHAMINE 30 MG: 30 INJECTION, SOLUTION INTRAMUSCULAR; INTRAVENOUS at 17:03

## 2020-10-22 NOTE — PROGRESS NOTES
Late entry due to ongoing patient cares.    Received update from Tushar Moore RN around 1400 regarding persistently elevated BPs in the severe range. Patient has chronic HTN and did not take antihypertensive medication prior to surgery today.  Discussed dose of IV labetalol now and order for regular dose of nifedipine 30mg XL to be given following.    She did receive labetalol with brief improvement in BPs, but had return to  Severe range BPs. Nifedipine had not been give at that time (1500),and HR was low, so iv hydralazine was ordered by resident and scheduled nifedipine was given at 1515.  MgSo4 was also started at that time due to sustained severe  Range BPs.  Labs ordered at 1400 were wnl. UOP was borderline at about 25cc/hr, so patient  Increased po fluid intake and has improved to about 100cc/hr for the past 2 hours.  Bps now mildly elevated. Will continue to monitor closely.    ELIZABETH KHAN MD

## 2020-10-22 NOTE — ANESTHESIA CARE TRANSFER NOTE
Patient: Gloria Escobar    Procedure(s):   SECTION    Diagnosis: Chronic hypertension affecting pregnancy [O10.919]  Transverse lie of fetus, single or unspecified fetus [O32.2XX0]  Diagnosis Additional Information: No value filed.    Anesthesia Type:   MAC, Spinal, Peripheral Nerve Block, For Post-op pain in coordination with surgeon     Note:  Airway :Room Air  Patient transferred to:PACU  Comments: VSS. Breathing spontaneously at a regular rate with adequate tidal volumes and maintaining O2 sats on room air. Denies nausea or pain. No apparent complications from anesthesia.     Efra Cleveland MD  Anesthesiology, CA-1  Handoff Report: Identifed the Patient, Identified the Reponsible Provider, Reviewed the pertinent medical history, Discussed the surgical course, Reviewed Intra-OP anesthesia mangement and issues during anesthesia, Set expectations for post-procedure period and Allowed opportunity for questions and acknowledgement of understanding      Vitals: (Last set prior to Anesthesia Care Transfer)    CRNA VITALS  10/22/2020 1055 - 10/22/2020 1134      10/22/2020             Pulse:  50    SpO2:  99 %                Electronically Signed By: Efra Cleveland MD  2020  11:34 AM

## 2020-10-22 NOTE — PROVIDER NOTIFICATION
Page to Dr. Cruz that blood sugar is 69 and giving patient aretha crackers, peanut butter and apple juice

## 2020-10-22 NOTE — ANESTHESIA CARE TRANSFER NOTE
Patient: Gloria Escobar    Procedure(s):   SECTION    Diagnosis: Chronic hypertension affecting pregnancy [O10.919]  Transverse lie of fetus, single or unspecified fetus [O32.2XX0]  Diagnosis Additional Information: No value filed.    Anesthesia Type:   MAC, Spinal, Peripheral Nerve Block, For Post-op pain in coordination with surgeon     Note:  Airway :Room Air  Patient transferred to:PACU  Handoff Report: Identifed the Patient, Identified the Reponsible Provider, Reviewed the pertinent medical history, Discussed the surgical course, Reviewed Intra-OP anesthesia mangement and issues during anesthesia, Set expectations for post-procedure period and Allowed opportunity for questions and acknowledgement of understanding      Vitals: (Last set prior to Anesthesia Care Transfer)    CRNA VITALS  10/22/2020 1055 - 10/22/2020 1129      10/22/2020             Pulse:  50    SpO2:  99 %                Electronically Signed By: Efra Cleveland MD  2020  11:29 AM

## 2020-10-22 NOTE — ANESTHESIA PROCEDURE NOTES
Peripheral Nerve Block Procedure Note      Staff -   Anesthesiologist:  Katya Castelan MD  Resident/Fellow: Efra Cleveland MD  Performed By: resident  Procedure performed by resident/CRNA in presence of a teaching physician.    Location: OB and OR AFTER induction  Procedure Start/Stop TImes:     patient identified, IV checked, site marked, risks and benefits discussed, informed consent, monitors and equipment checked, pre-op evaluation, at physician/surgeon's request and post-op pain management      Correct Patient: Yes      Correct Position: Yes      Correct Site: Yes      Correct Procedure: Yes      Correct Laterality:  Yes    Site Marked:  Yes  Procedure details:     Procedure:  TAP    ASA:  2    Laterality:  Bilateral    Position:  Supine    Sterile Prep: chloraprep      Needle:  Insulated    Needle gauge:  21    Needle length (inches):  4    Ultrasound: Yes      Ultrasound used to identify targeted nerve, plexus, or vascular structure and placed a needle adjacent to it      Permanent Image entered into patiient's record      Abnormal pain on injection: No      Blood Aspirated: No      Paresthesias:  No    Bleeding at site: No      Bolus via:  Needle    Infusion Method:  Single Shot    Blood aspirated via catheter: No      Complications:  None  Assessment/Narrative:      Routine bilateral TAP block

## 2020-10-22 NOTE — PROVIDER NOTIFICATION
10/22/20 1130   Provider Notification   Provider Name/Title CARLA Sanders MD   Method of Notification Phone   Request Evaluate-Remote   Notification Reason Vital Signs Change   Patient's heartrate running in mid 40s. Dr Sanders said this is her baseline.

## 2020-10-22 NOTE — PLAN OF CARE
Data: Gloria Escobar transferred to 7122 via cart at 1730. Baby in NICU. Crystal with clear urine. Running pitocin IV and Magnesium. Pt denies pain. TAP block done.   Action: Receiving unit notified of transfer: Yes. Patient and family notified of room change. Report given to Marge KOTHARI. Belongings sent to receiving unit. Accompanied by Registered Nurse. Oriented patient to surroundings.  Response: Patient tolerated transfer and is stable.  went home to see their kids.

## 2020-10-22 NOTE — OP NOTE
[facility]  Operative Note     Surgery Date:  10/22/2020  Surgeon:  Geraldine Ferro MD  Assistants:  Cheo Cruz MD PGY-2      Pre-op Diagnosis:    - Intrauterine pregnancy at 38w4d  - Chronic hypertension  - Type 2 Diabetes Mellitus  - Diabetic fetopathy  - AMA  - Obesity  - GBS+  - unstable fetal lie    Post-op Diagnosis:    - Same   - Liveborn female infant     Procedure:  Primary low-transverse  section with double layer uterine closure via Pfannenstiel incision    Anesthesia: Spinal  EBL:  976 mL  IVF:  800 mL crystalloid  UOP:  50 mL clear urine at the end of the case  Drains: Crystal Catheter   Specimens:  Routine cord blood and segment  Complications: None     Indications:   Gloria Escobar is a 40 year old  at 38w4d admitted for scheduled elective  section. CS initially planned due to breech status, but found to be vertex on admission. However due to type 2 diabetes mellitus, diabetic fetopathy (AC>HC) on ultrasound and risk of shoulder dystocia the patient opted for a primary  section.  The risks, benefits, and alternatives of  section were discussed with the patient, and she agreed to proceed.     Findings:   1. No adhesions  2. Clear amniotic fluid  3. Liveborn female infant in LOT presentation. Apgars 8 at 1 minute & 9 at 5 minutes. Weight 8lb 3.9oz  4. Normal uterus, fallopian tubes, and ovaries.     Procedure Details:   The patient was brought to the OR, where adequate spinal anesthesia was administered.  She was placed in the dorsal supine position with a slight leftward tilt. She was prepped and draped in the usual sterile fashion. A surgical time out was performed. A pfannenstiel skin incision was made with the scalpel, and carried down to the underlying fascia with sharp and blunt dissection. The fascia was incised in the midline, and the incision was extended laterally with the Ndiaye scissors. The superior aspect of  the fascia was grasped with the Kocher clamps and dissected off of the underlying rectus muscles with blunt and sharp dissection. Attention was then turned to the inferior aspect of the fascia, which was similarly dissected off of the underlying rectus muscles. The rectus muscles were  in the midline, and the peritoneum was entered bluntly, and the opening was extended with digital pressure and electrocautery. The bladder blade was placed. A transverse hysterotomy was made with the scalpel in the lower uterine segment, and the incision was extended with digital pressure. The infant was noted to be in the ROT position, and was delivered atraumatically. Single nuchal cord was noted and reduced after delivery. The cord was doubly clamped and cut, and the infant was handed off to the awaiting nursery staff. A segment of cord was cut and sent to the lab. The placenta was delivered with gentle traction on the umbilical cord and uterine massage. Uterine tone was noted to be firm with pitocin given through the running IV and uterine massage.  The hysterotomy was closed with a running locked suture of 0 Vicryl.  The hysterotomy was then imbricated using an 0 Vicryl suture. An area of oozing to the right of midline along the incision was controlled with 2 figure-of-X stitches with 0 Vicryl and cautery. The hysterotomy was noted to be hemostatic. The pericolic gutters were cleared of all clots and debris. The hysterotomy was reexamined and noted to be hemostatic. The fascia and rectus muscles were examined and areas of oozing were controlled with electrocautery. The fascia was closed with a running 0 Vicryl suture. The subcutaneous tissue was irrigated and areas of oozing were controlled with electrocautery. The subcutaneous tissue was greater than 2 cm in thickness, and was therefore closed with a running 2-0 Plain suture. The skin was closed with 4-0 Moncryl and covered with a sterile dressing.    All sponge,  needle, and instrument counts were correct. The patient tolerated the procedure well, and was transferred to recovery in stable condition. Dr. Ferro was present and scrubbed for the entirety of the procedure.     Cheo Cruz MD MPH  OB/Gyn PGY-2  10/22/20 12:11 PM    ATTESTATION:   I was scrubbed and present for the entire procedure. I agree with the above note which I have edited to reflect my findings.   Geraldine Ferro MD

## 2020-10-22 NOTE — ANESTHESIA POSTPROCEDURE EVALUATION
Anesthesia POST Procedure Evaluation    Patient: Gloria Escobar   MRN:     [id number] Gender:   female   Age:    40 year old :      1980        Preoperative Diagnosis: Chronic hypertension affecting pregnancy [O10.919]  Transverse lie of fetus, single or unspecified fetus [O32.2XX0]   Procedure(s):   SECTION   Postop Comments: No value filed.     Anesthesia Type: MAC, Spinal, Peripheral Nerve Block, For Post-op pain in coordination with surgeon       Disposition: Admission   Postop Pain Control: Uneventful            Sign Out: Well controlled pain   PONV: No   Neuro/Psych: Uneventful            Sign Out: Acceptable/Baseline neuro status   Airway/Respiratory: Uneventful            Sign Out: Acceptable/Baseline resp. status   CV/Hemodynamics: Uneventful            Sign Out: Acceptable CV status   Other NRE: NONE   DID A NON-ROUTINE EVENT OCCUR? No    Event details/Postop Comments:  HR high 40s, sinus, asymmptomatic         Last Anesthesia Record Vitals:  CRNA VITALS  10/22/2020 1055 - 10/22/2020 1155      10/22/2020             Pulse:  50    SpO2:  99 %          Last PACU Vitals:  Vitals Value Taken Time   /87 10/22/20 1230   Temp 36.4  C (97.6  F) 10/22/20 1130   Pulse 43 10/22/20 1237   Resp 15 10/22/20 1237   SpO2 100 % 10/22/20 1237   Temp src     NIBP     Pulse     SpO2     Resp     Temp     Ht Rate     Temp 2     Vitals shown include unvalidated device data.      Electronically Signed By: Katya Shaw MD, 2020, 2:14 PM

## 2020-10-22 NOTE — DISCHARGE SUMMARY
DELIVERY DISCHARGE SUMMARY    Gloria Escobar  : 1980  MRN: [id number]    Admit date: 10/22/2020  Discharge date: 10/26/2020    Admit Dx:   - 40 year old  at 38w4d  - Chronic hypertension  - Type 2 DM  - AMA  - Obesity  - GBS +    Discharge Dx:  - Same as above, s/p PLTCS  - Superimposed pre-E w/ severe features    Procedures:  - Primary low transverse  section with double layer closure via Pfannenstiel incision  - Analgesia via spinal epidural  - Magnesium infusion    Admit HPI:  Ms. Gloria Escobar is a 40 year old  at 38w4d who was admitted on 10/22/2020 for a scheduled primary low transvered  section. Please see her admit H&P for full details of her PMH, PSH, Meds, Allergies and exam on admit.    Consults:  - Anesthesia  - Lactation  - Endocrinology  - Social work    Hospital course:  Gloria Escobar is a 40 year old  admitted for scheduled elective  section. This was initially planned due to breech status, but US one day before admission showed cephalic presentation. Due to DM2 and increased risk of shoulder dystocia, patient opted to continue with planned  section.     Operative Findings:  1. No adhesion  2. Clear amniotic fluid  3. Liveborn female infant in LOT presentation. Apgars 8 at 1 minute & 9 at 5 minutes. Weight 8lb 3.9oz  4. Normal uterus, fallopian tubes, and ovaries.     EBL from the delivery was 976 mL. Please see her  Section Operative Note for full details regarding her delivery.    Her postoperative course was complicated by new diagnosis of SI pre-E w/ SF for which she was given IV antihypertensives and started on 24 hours of magnesium. She tolerated this well. Blood pressures were in high mild range on PTA nifedipine 30 mg so this was titrated up on HD#2 to 60 mg daily, and ultimately to 90 mg on HD#4, which she was discharged on. The patient was also evaluated by  endocrinology while inpatient who recommended restarting pre-pregnancy metformin at the time of discharge. On POD#4, she was meeting all of her postpartum goals and deemed stable for discharge. She was voiding without difficulty, tolerating a regular diet without nausea and vomiting, her pain was well controlled on oral pain medicines and her lochia was appropriate. Her hemoglobin prior to delivery was 11.2 and after delivery was 10. Her Rh status was positive and Rhogam was not indicated.      HGB  Recent Labs   Lab 10/23/20  0620   HGB 10.0*       Discharge Medications:     Review of your medicines      START taking      Dose / Directions   oxyCODONE 5 MG tablet  Commonly known as: ROXICODONE  Used for: Status post  section      Dose: 5 mg  Take 1 tablet (5 mg) by mouth every 6 hours as needed for moderate to severe pain  Quantity: 6 tablet  Refills: 0        CONTINUE these medicines which may have CHANGED, or have new prescriptions. If we are uncertain of the size of tablets/capsules you have at home, strength may be listed as something that might have changed.      Dose / Directions   metFORMIN 500 MG 24 hr tablet  Commonly known as: GLUCOPHAGE-XR  This may have changed: See the new instructions.  Used for: Type 2 diabetes mellitus without complication, with long-term current use of insulin (H)      Dose: 500 mg  Take 1 tablet (500 mg) by mouth 2 times daily (with meals)  Quantity: 60 tablet  Refills: 3     * NIFEdipine ER 60 MG 24 hr tablet  Commonly known as: ADALAT CC  This may have changed:     medication strength    how much to take  Used for: Chronic hypertension affecting pregnancy      Dose: 60 mg  Take 1 tablet (60 mg) by mouth daily  Quantity: 60 tablet  Refills: 0     * NIFEdipine ER 90 MG 24 hr tablet  Commonly known as: ADALAT CC  This may have changed: You were already taking a medication with the same name, and this prescription was added. Make sure you understand how and when to take  each.  Used for: Postpartum hypertension      Dose: 90 mg  Take 1 tablet (90 mg) by mouth daily  Quantity: 90 tablet  Refills: 1     senna-docusate 8.6-50 MG tablet  Commonly known as: SENOKOT-S/PERICOLACE  This may have changed:     when to take this    reasons to take this  Used for: High-risk pregnancy in third trimester      Dose: 1 tablet  Take 1 tablet by mouth 2 times daily as needed for constipation Start after delivery.  Quantity: 100 tablet  Refills: 0         * This list has 2 medication(s) that are the same as other medications prescribed for you. Read the directions carefully, and ask your doctor or other care provider to review them with you.            CONTINUE these medicines which have NOT CHANGED      Dose / Directions   Accu-Chek Guide Me w/Device Kit  Used for: Type 2 diabetes mellitus (H)      Dose: 1 Device  1 Device daily  Quantity: 1 kit  Refills: 0     * Accu-Chek Guide test strip  Used for: Type 2 diabetes mellitus without complication, with long-term current use of insulin (H)  Generic drug: blood glucose      Use to test blood sugar 4 times daily (before breakfast and 1 hours after start of each meal).  Quantity: 150 strip  Refills: 6     * blood glucose test strip  Commonly known as: NO BRAND SPECIFIED  Used for: Insulin controlled gestational diabetes mellitus (GDM) in third trimester      Use to test blood sugar 4 times daily or as directed.  Quantity: 100 each  Refills: 4     acetaminophen 325 MG tablet  Commonly known as: TYLENOL  Used for: High-risk pregnancy in third trimester      Dose: 650 mg  Take 2 tablets (650 mg) by mouth every 6 hours as needed for mild pain Start after Delivery.  Quantity: 100 tablet  Refills: 0     acetone urine test strip  Commonly known as: KETOSTIX  Used for: Type 2 diabetes mellitus without complication, with long-term current use of insulin (H)      Test first urine of every morning for 1 week. If consistently negative, test once weekly.  Quantity: 50  each  Refills: 3     blood glucose monitoring lancets  Used for: Type 2 diabetes mellitus without complication, with long-term current use of insulin (H)      Use to check blood glucose 4 times daily  Quantity: 204 each  Refills: 6     ibuprofen 600 MG tablet  Commonly known as: ADVIL/MOTRIN  Used for: High-risk pregnancy in third trimester      Dose: 600 mg  Take 1 tablet (600 mg) by mouth every 6 hours as needed for moderate pain Start after delivery  Quantity: 60 tablet  Refills: 0     insulin pen needle 31G X 5 MM miscellaneous  Commonly known as: 31G X 5 MM  Used for: Type 2 diabetes mellitus without complication, with long-term current use of insulin (H)      Use 4 pen needles daily or as directed.  Quantity: 200 each  Refills: 3     Prenatal Vitamin 27-0.8 MG Tabs  Used for: Multigravida of advanced maternal age in second trimester      Dose: 1 tablet  Take 1 tablet by mouth daily  Quantity: 100 tablet  Refills: 3         * This list has 2 medication(s) that are the same as other medications prescribed for you. Read the directions carefully, and ask your doctor or other care provider to review them with you.            STOP taking    aspirin 81 MG EC tablet  Commonly known as: ASA        insulin glargine 100 UNIT/ML pen  Commonly known as: Lantus SoloStar        NovoLOG FLEXPEN 100 UNIT/ML pen  Generic drug: insulin aspart              Where to get your medicines      These medications were sent to [facility] [place], [place] - [address]  [address] [address]    Phone: [phone]     acetaminophen 325 MG tablet    ibuprofen 600 MG tablet    metFORMIN 500 MG 24 hr tablet    NIFEdipine ER 60 MG 24 hr tablet    NIFEdipine ER 90 MG 24 hr tablet    oxyCODONE 5 MG tablet    Prenatal Vitamin 27-0.8 MG Tabs    senna-docusate 8.6-50 MG tablet         Discharge/Disposition:  Tamaradel Escobar was discharged to home in stable condition with the following  instructions/medications:  1) Call for temperature > 100.4, bright red vaginal bleeding >1 pad an hour x 2 hours, foul smelling vaginal discharge, pain not controlled by usual oral pain meds, persistent nausea and vomiting not controlled on medications, drainage or redness from incision site  2) She desired undecided for contraception.  3) For feeding she decided to pump while baby was in NICU, eventually breast feed.  4) She was instructed to follow-up with her primary OB in 6 weeks for a routine postpartum visit and in 1 week for a blood pressure check.  5) Discharge activity:  No heavy lifting >15 lbs or strenuous activity for 6 weeks, pelvic rest for 6 weeks, no driving or operating machinery while on narcotics.  6 ) Diabetes follow up with Endocrine clinic 1-2 weeks (she thinks she will prefer to follow at [facility] but referral made for [facility] just in case).     Attending for discharge: Dr. Albarran.    Cheo Cruz MD MPH  OB/Gyn PGY-2  10/26/20 8:44 AM

## 2020-10-22 NOTE — PROGRESS NOTES
10/22/20 1349   Spiritual Beliefs   In support of your spiritual health, is there someone we may contact for you? (identify all that apply)   ([facility]/10-22)   Visit Information   Visit Made By Staff    Type of Visit Initial;Staff consultation/triage;On-call   Visited Patient/family not available;Other  (Gloria need interp [facility]   Interventions   Plan of Care Review   With interdisciplinary team   SPIRITUAL HEALTH SERVICES  [facility] ([facility] Pacu - [facility]  ON-CALL VISIT     REFERRAL SOURCE: Hospital  request.      In conversation with RN in PACU learned that Gloria had surgically given birth to her fourth child and would be going to [facility] later today.  Also learned that Gloria needs an .     PLAN: Once Gloria is settled in Peds , I will organize a visit with her nurse.       Jeni White  Staff   Spiritual Health Services  Pgr: [phone]    * [facility] remains available 24/7 for emergent requests/referrals, either by having the switchboard page the on-call  or by entering an ASAP/STAT consult in Epic (this will also page the on-call ).*

## 2020-10-22 NOTE — BRIEF OP NOTE
[facility]  Brief Operative Note     Surgery Date:  10/22/2020  Surgeon:  Geraldine Ferro MD  Assistants:  Cheo Cruz MD PGY-2      Pre-op Diagnosis:    - Intrauterine pregnancy at 38w4d  - Chronic hypertension  - Type 2 Diabetes Mellitus  - Suspected LGA  - AMA  - Obesity  - GBS+    Post-op Diagnosis:    - Same   - Liveborn female infant     Procedure:  Primary low-transverse  section with double layer uterine closure via Pfannenstiel incision    Anesthesia: Spinal  EBL:  976 mL  IVF:  800 mL crystalloid  UOP:  50 mL clear urine at the end of the case  Drains: Crystal Catheter   Specimens:  Routine cord blood and segment  Complications: None     Findings:   1. No adhesion  2. Clear amniotic fluid  3. Liveborn female infant in LOT presentation. Apgars 8 at 1 minute & 9 at 5 minutes. Weight 8lb 3.9oz  4. Normal uterus, fallopian tubes, and ovaries.     Cheo Cruz MD MPH  OB/Gyn PGY-2  10/22/20 11:17 AM

## 2020-10-22 NOTE — PROGRESS NOTES
Updated Dr. Albarran of sustained blood pressures and order from Dr. Flood to give hydralazine d/t lower pulse.  Updated her on the urine output of 125 for 5 hours. Patient has po hydrated about 800 in the last 15 minutes. Updated 4 grams of mag has been started.  Patient continues to decline pre-e symptoms. Tolerating po fluids. Nifedipine xl given to patient at 1500.

## 2020-10-22 NOTE — PROGRESS NOTES
Patient to OR #1 at 0849 pt was sitting on the OR table for her spinal anaesthesia when a STAT  section was called.  The surgical team had to leave to attend that delivery.  Patient brought back to her triage room at 0855 with her souse to await her . 0940 patient brought back to the OR to start her  section.

## 2020-10-22 NOTE — PROGRESS NOTES
Late:  Writer spoke with  Dr. Padilla pgy-3 re: pt increased bp and lower output but have not measured as of yet.  Pt. Sustained bp's.  Order to start mag 4 gm load then 2 gm per hour also to change anti hypertensive to hydralazine d/t lower pulse

## 2020-10-22 NOTE — PLAN OF CARE
Patient arrived to [facility] unit via zoom cart at 1746,with belongings, accompanied by RN, with infant  in NICU. Received report from SANIYA Strange and checked bands. Unit and room orientation completd. Call light within arms reach; no concerns present at this time. Continue with plan of care.

## 2020-10-22 NOTE — OR NURSING
Late: Writer spoke with Dr. Albarran re: pt blood pressures being elevated. Order to start labetalol protocol and give her regular does of nifedipine at 1700.  Pt. Denies symptoms of pre-eclampsia.

## 2020-10-22 NOTE — PROVIDER NOTIFICATION
10/22/20 1226   Provider Notification   Provider Name/Title Dr Cruz   Method of Notification Electronic Page   Request Evaluate - Remote   Notification Reason Lab/Diagnostic Study   PT 2 hr post delivery 68, drank x1 apple juice, repeat BG 15 mins later, still 68. Giving another apple juice

## 2020-10-22 NOTE — ANESTHESIA PROCEDURE NOTES
Spinal/LP Procedure Note    Spinal Block      Staff -   Anesthesiologist:  Katya Castelan MD  Resident/Fellow: Efra Cleveland MD  Performed By: resident  Procedure performed by resident/CRNA in presence of a teaching physician.    Location: OR and OB  Procedure Start/Stop Times:      10/22/2020 9:45 AM     10/22/2020 9:55 AM    patient identified, IV checked, site marked, risks and benefits discussed, informed consent, monitors and equipment checked, pre-op evaluation, at physician/surgeon's request and post-op pain management      Correct Patient: Yes      Correct Position: Yes      Correct Site: Yes      Correct Procedure: Yes      Correct Laterality:  Yes    Site Marked:  Yes  Procedure:     Procedure:  Intrathecal    ASA:  2    Diagnosis:  C section    Position:  Sitting    Sterile Prep: chloraprep      Insertion site:  L3-4    Approach:  Midline    Needle Type:  Maxine    Needle gauge (G):  25    Local Skin Infiltration:  1% lidocaine    amount (ml):  2    Needle Length (in):  3.5    Introducer used: Yes      Introducer gauge:  20 G    Attempts:  1    Redirects:  0    CSF:  Clear    Paresthesias:  No  Assessment/Narrative:      Routine spinal block. No immediate complications.

## 2020-10-23 ENCOUNTER — APPOINTMENT (OUTPATIENT)
Dept: INTERPRETER SERVICES | Facility: CLINIC | Age: 40
End: 2020-10-23
Payer: COMMERCIAL

## 2020-10-23 LAB
ALT SERPL W P-5'-P-CCNC: 14 U/L (ref 0–50)
AST SERPL W P-5'-P-CCNC: 16 U/L (ref 0–45)
CREAT SERPL-MCNC: 0.49 MG/DL (ref 0.52–1.04)
GFR SERPL CREATININE-BSD FRML MDRD: >90 ML/MIN/{1.73_M2}
GLUCOSE BLDC GLUCOMTR-MCNC: 107 MG/DL (ref 70–99)
GLUCOSE BLDC GLUCOMTR-MCNC: 118 MG/DL (ref 70–99)
GLUCOSE BLDC GLUCOMTR-MCNC: 129 MG/DL (ref 70–99)
GLUCOSE BLDC GLUCOMTR-MCNC: 153 MG/DL (ref 70–99)
GLUCOSE BLDC GLUCOMTR-MCNC: 170 MG/DL (ref 70–99)
GLUCOSE BLDC GLUCOMTR-MCNC: 92 MG/DL (ref 70–99)
HGB BLD-MCNC: 10 G/DL (ref 11.7–15.7)
PLATELET # BLD AUTO: 215 10E9/L (ref 150–450)

## 2020-10-23 PROCEDURE — 250N000011 HC RX IP 250 OP 636: Performed by: STUDENT IN AN ORGANIZED HEALTH CARE EDUCATION/TRAINING PROGRAM

## 2020-10-23 PROCEDURE — 85049 AUTOMATED PLATELET COUNT: CPT | Performed by: STUDENT IN AN ORGANIZED HEALTH CARE EDUCATION/TRAINING PROGRAM

## 2020-10-23 PROCEDURE — 85018 HEMOGLOBIN: CPT | Performed by: STUDENT IN AN ORGANIZED HEALTH CARE EDUCATION/TRAINING PROGRAM

## 2020-10-23 PROCEDURE — 84450 TRANSFERASE (AST) (SGOT): CPT | Performed by: STUDENT IN AN ORGANIZED HEALTH CARE EDUCATION/TRAINING PROGRAM

## 2020-10-23 PROCEDURE — 250N000013 HC RX MED GY IP 250 OP 250 PS 637: Performed by: STUDENT IN AN ORGANIZED HEALTH CARE EDUCATION/TRAINING PROGRAM

## 2020-10-23 PROCEDURE — 999N001017 HC STATISTIC GLUCOSE BY METER IP

## 2020-10-23 PROCEDURE — 258N000003 HC RX IP 258 OP 636: Performed by: STUDENT IN AN ORGANIZED HEALTH CARE EDUCATION/TRAINING PROGRAM

## 2020-10-23 PROCEDURE — 84460 ALANINE AMINO (ALT) (SGPT): CPT | Performed by: STUDENT IN AN ORGANIZED HEALTH CARE EDUCATION/TRAINING PROGRAM

## 2020-10-23 PROCEDURE — 99223 1ST HOSP IP/OBS HIGH 75: CPT | Performed by: NURSE PRACTITIONER

## 2020-10-23 PROCEDURE — 82565 ASSAY OF CREATININE: CPT | Performed by: STUDENT IN AN ORGANIZED HEALTH CARE EDUCATION/TRAINING PROGRAM

## 2020-10-23 PROCEDURE — 120N000002 HC R&B MED SURG/OB UMMC

## 2020-10-23 PROCEDURE — 36415 COLL VENOUS BLD VENIPUNCTURE: CPT | Performed by: STUDENT IN AN ORGANIZED HEALTH CARE EDUCATION/TRAINING PROGRAM

## 2020-10-23 RX ORDER — NIFEDIPINE 30 MG/1
60 TABLET, EXTENDED RELEASE ORAL DAILY
Status: DISCONTINUED | OUTPATIENT
Start: 2020-10-23 | End: 2020-10-23

## 2020-10-23 RX ORDER — NIFEDIPINE 30 MG/1
60 TABLET, EXTENDED RELEASE ORAL DAILY
Status: DISCONTINUED | OUTPATIENT
Start: 2020-10-23 | End: 2020-10-26

## 2020-10-23 RX ADMIN — KETOROLAC TROMETHAMINE 30 MG: 30 INJECTION, SOLUTION INTRAMUSCULAR; INTRAVENOUS at 06:04

## 2020-10-23 RX ADMIN — ACETAMINOPHEN 975 MG: 325 TABLET, FILM COATED ORAL at 08:59

## 2020-10-23 RX ADMIN — OXYCODONE HYDROCHLORIDE 5 MG: 5 TABLET ORAL at 15:46

## 2020-10-23 RX ADMIN — DOCUSATE SODIUM AND SENNOSIDES 2 TABLET: 8.6; 5 TABLET ORAL at 07:31

## 2020-10-23 RX ADMIN — ACETAMINOPHEN 975 MG: 325 TABLET, FILM COATED ORAL at 02:07

## 2020-10-23 RX ADMIN — IBUPROFEN 800 MG: 800 TABLET ORAL at 23:59

## 2020-10-23 RX ADMIN — MAGNESIUM SULFATE HEPTAHYDRATE 2 G/HR: 40 INJECTION, SOLUTION INTRAVENOUS at 01:51

## 2020-10-23 RX ADMIN — IBUPROFEN 800 MG: 800 TABLET ORAL at 18:14

## 2020-10-23 RX ADMIN — NIFEDIPINE 60 MG: 30 TABLET, FILM COATED, EXTENDED RELEASE ORAL at 02:07

## 2020-10-23 RX ADMIN — SODIUM CHLORIDE, POTASSIUM CHLORIDE, SODIUM LACTATE AND CALCIUM CHLORIDE 75 ML/HR: 600; 310; 30; 20 INJECTION, SOLUTION INTRAVENOUS at 07:40

## 2020-10-23 RX ADMIN — IBUPROFEN 800 MG: 800 TABLET ORAL at 11:56

## 2020-10-23 RX ADMIN — ACETAMINOPHEN 975 MG: 325 TABLET, FILM COATED ORAL at 14:38

## 2020-10-23 RX ADMIN — DOCUSATE SODIUM AND SENNOSIDES 2 TABLET: 8.6; 5 TABLET ORAL at 20:56

## 2020-10-23 RX ADMIN — ACETAMINOPHEN 975 MG: 325 TABLET, FILM COATED ORAL at 20:56

## 2020-10-23 RX ADMIN — MAGNESIUM SULFATE HEPTAHYDRATE 2 G/HR: 40 INJECTION, SOLUTION INTRAVENOUS at 11:25

## 2020-10-23 NOTE — PROVIDER NOTIFICATION
Resident at bedside: Dr. Roberts notified of moderately elevated blood pressures. Notify if BP > 160/110.

## 2020-10-23 NOTE — CONSULTS
[facility]  MATERNAL CHILD HEALTH   INITIAL NICU PSYCHOSOCIAL ASSESSMENT      DATA:      Presenting Information: Mom is a  who delivered an infant female on 10/22/220 at 38w4d gestation. Baby was admitted to the NICU for hypoglycemia.  SW was consulted to meet with this family per NICU admission of infant.      Living Situation: Parents are Gloria Adair and Haim () and live together in [place], along with their three other daughters, ages 16, 13, and 11.     Social Support: Gloria reports that she has good support.     Insurance: [facility]      Mental Health History: None indicated     History of Postpartum Mood Disorders: None indicated     Chemical Health History: None indicated     Legal/Child Protection Involvement: None indicated     INTERVENTION:        Chart review    Collaboration with team: Mom's RN Fatoumata; Baby's RN Zeke    Conducted Psychosocial Assessment    Introduction to Maternal Child Health SW role and scope of practice    Orientation to the NICU (parking, lodging, meals, visitation)    Validated emotions and provided supportive listening    Provided resources and referrals  ? Weekly parking     ASSESSMENT:      Coping: Mom appears to be coping well. She admits that is has been stressful to not be with her baby immediately after delivery, but she has been visiting regularly and is glad to be able to stay bedside in baby's private room once she herself has discharged.     Risk Factors: Hospitalization during global pandemic, unexpected NICU stay     Resiliency Factors & Strengths: Experienced parent, good support network     PLAN:      SW will continue to follow for supportive intervention.     VEE Salcedo, [facility]  Clinical   Maternal Child Health  [facility]  Direct: [phone]  Pager: [phone]  After Hours SW: [phone]

## 2020-10-23 NOTE — PROGRESS NOTES
[facility] Obstetrics   Postpartum Progress Note    Name: Gloria Escobar  : 1980  MRN: [id number]    POD#1 s/p PLTCS    S: Patient is resting comfortably.  Pain is well controlled.  Bleeding is light. Not yet ambulating and liu still in place due to magnesium infusion.  Tolerating a regular diet without nausea/vomiting.  Denies headache, vision changes, CP, SOB or RUQ pain.  She is pumping, not yet producing milk.     O:  Patient Vitals for the past 24 hrs:   BP Temp Temp src Pulse Resp SpO2   10/23/20 0746 139/80 98.6  F (37  C) Oral 67 18 98 %   10/23/20 0611 139/81 -- -- -- 16 99 %   10/23/20 0300 123/74 -- -- -- 16 97 %   10/22/20 2333 (!) 151/103 98.1  F (36.7  C) Oral -- 16 100 %   10/22/20 1942 -- 97.6  F (36.4  C) Oral -- -- --   10/22/20 1900 (!) 158/92 -- -- 54 16 100 %   10/22/20 1818 (!) 150/96 -- -- (!) 49 16 100 %   10/22/20 1800 (!) 156/87 -- -- 54 16 100 %   10/22/20 1742 (!) 155/92 98.4  F (36.9  C) Oral 54 16 100 %   10/22/20 1656 -- -- -- 54 10 100 %   10/22/20 1650 (!) 148/85 -- -- 50 18 --   10/22/20 1635 (!) 153/85 -- -- (!) 47 14 --   10/22/20 1620 (!) 153/87 -- -- 51 11 100 %   10/22/20 1605 (!) 149/88 -- -- (!) 46 15 --   10/22/20 1600 (!) 150/89 -- -- 54 25 --   10/22/20 1555 (!) 146/88 -- -- 54 29 --   10/22/20 1550 (!) 151/91 -- -- (!) 49 15 --   10/22/20 1545 (!) 157/84 -- -- (!) 46 13 --   10/22/20 1540 (!) 152/87 -- -- (!) 48 11 --   10/22/20 1535 (!) 153/85 -- -- (!) 47 14 --   10/22/20 1525 (!) 164/84 -- -- (!) 44 12 --   10/22/20 1515 (!) 163/89 -- -- 50 16 --   10/22/20 1506 (!) 169/90 -- -- (!) 49 (!) 0 100 %   10/22/20 1500 (!) 154/93 -- -- 50 16 100 %   10/22/20 1450 (!) 162/91 -- -- (!) 49 18 100 %   10/22/20 1440 (!) 159/86 -- -- (!) 47 14 100 %   10/22/20 1430 (!) 148/83 -- -- (!) 45 12 99 %   10/22/20 1420 (!) 158/91 -- -- (!) 43 12 100 %   10/22/20 1416 -- -- -- (!) 40 12 100 %   10/22/20 1415 (!) 158/91 -- -- (!) 44 23 100 %   10/22/20 1410 (!) 150/83 --  -- (!) 47 14 100 %   10/22/20 1400 (!) 145/105 -- -- (!) 45 9 100 %   10/22/20 1350 (!) 163/92 -- -- (!) 45 18 100 %   10/22/20 1345 (!) 163/92 -- -- (!) 43 12 100 %   10/22/20 1330 (!) 155/87 -- -- (!) 47 13 100 %   10/22/20 1318 -- -- -- -- -- 100 %   10/22/20 1315 (!) 163/96 -- -- 50 13 100 %   10/22/20 1300 (!) 164/104 -- -- (!) 47 19 99 %   10/22/20 1245 (!) 152/109 -- -- (!) 44 17 100 %   10/22/20 1230 (!) 147/87 -- -- (!) 43 10 100 %   10/22/20 1215 (!) 147/85 -- -- (!) 47 15 100 %   10/22/20 1200 (!) 140/93 -- -- (!) 42 16 100 %   10/22/20 1145 (!) 148/82 -- -- (!) 46 17 99 %   10/22/20 1130 (!) 144/99 97.6  F (36.4  C) Oral -- -- 99 %       Gen: NAD. Alert, oriented. Resting comfortably in bed.  CV: regular rate  Resp:  CTAB  Abd: Soft, appropriately tender, fundus firm at 2 cm below the umbilicus, non-distended  Incision: Dry pfannenstiel bandage overlying   Ext: trace lower extremity edema bilaterally     Labs:   HGB  Recent Labs   Lab 10/23/20  0620 10/19/20  1400   HGB 10.0* 11.2*     Recent Labs   Lab 10/23/20  0730 10/23/20  0311 10/23/20  0040 10/22/20  1814 10/22/20  1557 10/22/20  1317   * 129* 170* 121* 109* 79       A/P: Gloria Escobar is a 40 year old  female, now POD#1 s/p PLTCS for suspected fetal macrosomia in setting of type 2 DM and preeclampsia with SF.  Stable in the postoperative period.      Postpartum Care  - Continue routine postoperative management  - Rh pos, Rubella immune, Hep BSAg NR   - Pain: Continue ibuprofen/Tylenol scheduled.  Roxicodone PRN for breakthrough.  - ABLA: Hgb 11.2 > +70> AM Hgb pending, asymptomatic. Will discharge with PO Fe if Hgb <10.  - Incision: Remove bandage today   - FEN/GI: regular diet, stool softeners PRN  - Crystal will come out after mag is discontinued at 1014 today  - Contraception: need to discuss  - Pumping  - Baby in NICU with hypoglycemia     Pre-eclampsia with severe features.  - Mag started at prior to CS;  currently running @ 2g/hr  - Mag ordered to discontinue at 24h postpartum (1014 today)  -HELLP labs wnl on admission. Repeat pending this morning.    - Multiple sustained severe range BP yesterday, required IV labetalol 20 mg x1 (1400) and patient had significant bradcrdia with HR 40-50. Will only use IR nifedipine or IV hydralazine for further sustained SR BP.   - BP high mild range overnight, responded to nifedipine at 2 AM, now improved.   - D#1 nifedipine 60 mg daily   -UOP adequate*     T2DM  - PTA Lantus 18U AM, Aspart 4/5/5 held  - MSSI BG, aQC/HS BG checks  - Endocrine consult requested, appreciate recommendations    Dispo: anticipate discharge to home on POD#3.    Yesi Roberts MD  OBGYN PGY-2  10/23/2020 6:47 AM     Physician Attestation   I, Katarina Matthews MD, personally examined and evaluated this patient.  I discussed the patient with the resident/fellow and care team, and agree with the assessment and plan of care as documented in the note of October 23, 2020.      I personally reviewed vital signs, medications and labs.  Hemoglobin   Date Value Ref Range Status   10/23/2020 10.0 (L) 11.7 - 15.7 g/dL Final   10/19/2020 11.2 (L) 11.7 - 15.7 g/dL Final        Key findings: patient is sitting up in bed, ready to eat.  Feeling fine.  She got a dose of nifedipine 60 mg at 2 AM and her BP's have been much improved since then. Will discontinue magnesium today, remove liu and have patient ambulate.  Continue to monitor BP's and titrate med prn. Plan discharge pod 2 or 3.   Katarina Matthews MD  Date of Service (when I saw the patient): 10/23/20

## 2020-10-23 NOTE — PROVIDER NOTIFICATION
10/23/20 0203   Provider Notification   Provider Name/Title Dr. Roberts, Resident   Method of Notification Electronic Page   Request Evaluate-Remote   Notification Reason Other   Can you just clarify for me that you want the nifedipine given now? Thanks

## 2020-10-23 NOTE — PROGRESS NOTES
"Post  Anesthesia Follow Up Note    Patient: Gloria Escobar    Chief complaint: Acute postoperative pain managment    Procedure(s) Performed:   section    Anesthesia type: Spinal Block and transversus abdominus plane (TAP) nerve block        Subjective:   The patient reports good pain control.  Pain Intensity: mild.  Patient reports no weakness, paresthesia, headache, or pain at neuraxial site.  The patient states her lower extremities feel back to normal.     Objective:  Last Vitals: /80   Pulse 67   Temp 37  C (98.6  F) (Oral)   Resp 18   LMP 2020   SpO2 98%   Breastfeeding Unknown     Respiratory Function (RR / SpO2 / Airway Patency): Satisfactory    Cardiac Function (HR / Rhythm / BP): Satisfactory    Neurologic: grossly intact    Strength and sensation lower extremities: Strength 5/5 and grossly symmetric bilateral LE      Assessment and Plan:   Gloria Escobar is a 40 year old female POD #1 s/p   section with intrathecal local anesthetics with opioids and single shot bilateral  TAP nerve block injection with long acting Exparel (liposomal bupivacaine) for postoperative analgesia.      She has no weakness or paresthesias.  No evidence of adverse side effects associated with anesthesia or nerve block injection. I anticipate up to 72 hours of incisional pain control, but that the patient will require opioid/nonopioid analgesics for visceral and muscle pain not controlled with local anesthetic.      - NO other local anesthetic use within 96 hours of liposome bupivacaine (Exparel) long acting  - discussed with patient to expect increase in pain with increased activity initially  - please call if questions or concerns      Timothy \"Jason\" Tommy DRISCOLL MD  Anesthesia Resident  10/23/2020  8:20 AM    If questions or concerns, please contact OB anesthesia resident at [phone] or OB anesthesiologist at [phone] if resident unavailable.               "

## 2020-10-23 NOTE — CONSULTS
"In-Patient Diabetes/Hyperglycemia Management Consult    Chief Complaint:  \"To determine transition plan for glycemic management from labor to postpartum\".  Consult requested by: Piter Cruz MD    History of Present Illness:   Gloria Escobar is a 40 year old  at 38w4d by 10w3d US who presents for scheduled CS for transverse position. Pregnancy complicated by chronic hypertension and type 2 diabetes.  Patient presented to [facility] for delivery and  decided was performed 10/22.  Admitted with concerns for preeclampsia w severe features - mag was started prior to , remains on today.  She denies any focal symptoms. Per OB note - mag to be discontinued today, liu removed and advanced as tolerated with expection of discharge POD#3 (tomorrow).     Diabetes:  Gloria Escobar is a 40 year old female  with Estimated Date of Delivery: 2020 who is now post-partum.  Gloria reports that she was diagnosed with type 2 diabetes last year and she had been taking metformin but discontinued this in March as soon as she learned that she was pregnant.  This was her first pregnancy with diabetes.  She has 3 older daughters now ages 16, 13 and 11 and denies any known elevated blood sugars during her pregnancies with these older children.  Her A1c  at OB intake was far above goal at 8.5.  She established care in our clinic with Dr. Hughes in April but then was lost to follow-up here.  Was seen by clinic diabetes PA-C 10/6/2020.  Fortunately, she also established care with the diabetic education and has continued to follow with them intermittently.  She last saw Jada dias on  and at that time he had her fasting and post breakfast blood sugars at goal, but had some potentially elevated post lunch and dinner blood glucose values. She reports taking fixed dose Novolog 3-4 units with each meal and 18 units of basal glargine each day.    Reports " "her BG fasting <95   Stopped checking 1 hr PP  @ hr PP <120  Reviewed with her the levels reported while in clinic and states they stayed in the same ranges.  80-91 fasting, 110-120 2 hr PP.  No lows.     -  BG at time of consult - 118.     - yesterday range 55 - 170 (170 at 0040) patient reports not eating much but having a sandwich later last evening which is consistent with the BG accordian review.   - Novolog sliding scale insulin ordered - medium intensity  -  She has resumed a regular diet - eating breakfast  -  Dextrose fluids ordered do not appear to be given and not running at time of consult  LOS:  Discharge on POD #3 (tomorrow)    Diabetes:  Recent Labs   Lab 10/23/20  0730 10/23/20  0311 10/23/20  0040 10/22/20  1814 10/22/20  1557 10/22/20  1317   * 129* 170* 121* 109* 79     Diabetes Type:   Type 2 Diabetes  Diabetes Duration: approximately 3/2019    Usual Diabetes Regimen:     Medications: Lantus 18  Novolog 3-4 units fixed dose with meals    Pre-pragnancy:  Metformin  mg BID and tolerates lower dose but not higher dosing    BG monitoring frequency:  TID at least at home while pregnant  Diet: regular, no restrictions  Exercise: light    Ability to [place] Prescribed Regimen: independent    Diabetes Control:   Lab Results   Component Value Date    A1C 5.5 10/09/2020    A1C 8.5 2020     Diabetes Complications: no retinopathy, last dilated eye exam \"august or September this fall\", denies peripheral neuropathy, no nephropathy  History of DKA:   no  Able to Detect Hypoglycemia: yes - feliz  Usual Diabetes Care Provider: Dr. Hughes/Maritza Mendoza PA-C    Factors Impacting Glucose Control: post op , post partum    Review of Systems:  CC:  Denies complaints \"I feel very good today\" \"small pain at my incision but that is small\"    Constitutional:   No fever, no chills  ENT/Mouth:   No hearing changes, no ear pain, no sore throat, no rhinorrhea, no difficulty swallowing  Eyes:  No " eye pain, no discharge, no vision changes  CV:   No CP/SOB, no new edema  Resp:  No cough, no wheezing  GI:   No nausea, no vomiting, denies constipation, no diarrhea - reported nausea yesterday but that has resolved - hungry now  :  Catheter   Musk:  No joint swelling/pain, No back pain  Skin/heme:   No new rashes/bruises/open areas.  No pruritis  Neuro:   No new weakness, no numbness/tingling, no headache  Psych:   No new anxiety, nodepression  Endocrine:  No polyuria, no polydipsia    Past medical, family and social histories are reviewed and updated.    Past Medical History  Past Medical History:   Diagnosis Date     Hypertension      Type 2 diabetes mellitus (H) .       Family History  Family History   Problem Relation Age of Onset     Diabetes Mother         type 2      Thyroid Cancer Mother      Diabetes Father         type 2       Social History  Social History     Socioeconomic History     Marital status:      Spouse name: None     Number of children: None     Years of education: None     Highest education level: None   Occupational History     None   Social Needs     Financial resource strain: None     Food insecurity     Worry: None     Inability: None     Transportation needs     Medical: None     Non-medical: None   Tobacco Use     Smoking status: Never Smoker     Smokeless tobacco: Never Used   Substance and Sexual Activity     Alcohol use: No     Drug use: No     Sexual activity: Yes     Partners: Male   Lifestyle     Physical activity     Days per week: None     Minutes per session: None     Stress: None   Relationships     Social connections     Talks on phone: None     Gets together: None     Attends Restorationism service: None     Active member of club or organization: None     Attends meetings of clubs or organizations: None     Relationship status: None     Intimate partner violence     Fear of current or ex partner: None     Emotionally abused: None     Physically abused: None     Forced  "sexual activity: None   Other Topics Concern     None   Social History Narrative     None     Physical Exam:  /80   Pulse 67   Temp 98.6  F (37  C) (Oral)   Resp 18   LMP 01/20/2020   SpO2 98%   Breastfeeding Unknown     General:  pleasant  resting in bed, in no distress.  Finishing her breakfast  HEENT: NC/AT, PER and anicteric, non-injected, oral mucous membranes moist.   Lungs: WNL respiration, no cough, no SOB  ABD:  Soft, no ridigity  Skin: warm and dry, no obvious lesions  Feet:  CMS intact  MSK:  fluid movement of all extremities, no calf discomfort  Lymp:  no LE edema   Mental status:  alert, oriented x3, communicating clearly with writer through   Psych:  calm, even mood, full affect      Laboratory  Recent Labs   Lab Test 10/23/20  0620 10/22/20  1421 04/24/18  2329 04/24/18  2329   NA  --   --   --  138   POTASSIUM  --   --   --  4.0   CHLORIDE  --   --   --  103   CO2  --   --   --  24   ANIONGAP  --   --   --  11   GLC  --   --   --  393*   BUN  --   --   --  14   CR 0.49* 0.49*   < > 0.55   SWATHI  --   --   --  8.7    < > = values in this interval not displayed.     CBC RESULTS:   Recent Labs   Lab Test 10/23/20  0620 10/19/20  1400   WBC  --  8.2   RBC  --  3.66*   HGB 10.0* 11.2*   HCT  --  32.9*   MCV  --  90   MCH  --  30.6   MCHC  --  34.0   RDW  --  13.4    238       Liver Function Studies -   Recent Labs   Lab Test 10/23/20  0620   AST 16   ALT 14       Active Medications  Current Facility-Administered Medications   Medication     acetaminophen (TYLENOL) tablet 975 mg     [START ON 10/24/2020] bisacodyl (DULCOLAX) Suppository 10 mg     bupivacaine liposome (EXPAREL) LONG ACTING injection was administered into the infiltration site to produce postsurgical analgesia. Duration of action is up to 72 hours, and other \"marizol\" medications should not be given for 96 hours with the exception of the lidocaine 5% patch (LIDODERM) and the lidocaine 10mg in potassium infusions. " This entry is for INFORMATION ONLY.     calcium gluconate 10 % injection 1 g     carboprost (HEMABATE) injection 250 mcg     dextrose 5% in lactated ringers infusion     glucose gel 15-30 g    Or     dextrose 50 % injection 25-50 mL    Or     glucagon injection 1 mg     diphenhydrAMINE (BENADRYL) capsule 25 mg    Or     diphenhydrAMINE (BENADRYL) injection 25 mg     ePHEDrine injection 5 mg     hydrALAZINE (APRESOLINE) algorithm-medication instruction     hydrALAZINE (APRESOLINE) injection 10 mg     hydrALAZINE (APRESOLINE) injection 5 mg     hydrocortisone 2.5 % cream     ibuprofen (ADVIL/MOTRIN) tablet 800 mg     insulin aspart (NovoLOG) injection (RAPID ACTING)     insulin aspart (NovoLOG) injection (RAPID ACTING)     lactated ringers BOLUS 1,000 mL    Or     lactated ringers BOLUS 500 mL     lactated ringers BOLUS 1,000 mL     lactated ringers BOLUS 250 mL     lactated ringers infusion     lanolin cream     lidocaine (LMX4) cream     lidocaine (LMX4) cream     lidocaine 1 % 0.1-1 mL     lidocaine 1 % 0.1-1 mL     magnesium hydroxide (MILK OF MAGNESIA) suspension 30 mL     magnesium sulfate infusion     medication instruction     nalbuphine (NUBAIN) injection 2.5-5 mg     naloxone (NARCAN) injection 0.1-0.4 mg     naloxone (NARCAN) injection 0.1-0.4 mg     NIFEdipine (PROCARDIA) capsule 10 mg     NIFEdipine ER OSMOTIC (PROCARDIA XL) 24 hr tablet 60 mg     No MMR Needed -  Assessment: Patient does not need MMR vaccine     NO Rho (D) immune globulin (RhoGam) needed - mother Rh POSITIVE     No Tdap Needed - Assessment: Patient does not need Tdap vaccine     ondansetron (ZOFRAN) injection 4 mg     Opioid plan postpartum - medication instruction     oxyCODONE (ROXICODONE) tablet 5 mg     oxytocin (PITOCIN) 30 units in 500 mL 0.9% NaCl infusion     oxytocin (PITOCIN) 30 units in 500 mL 0.9% NaCl infusion     oxytocin (PITOCIN) injection 10 Units     senna-docusate (SENOKOT-S/PERICOLACE) 8.6-50 MG per tablet 1 tablet     Or     senna-docusate (SENOKOT-S/PERICOLACE) 8.6-50 MG per tablet 2 tablet     simethicone (MYLICON) chewable tablet 80 mg     sodium chloride (PF) 0.9% PF flush 3 mL     sodium chloride (PF) 0.9% PF flush 3 mL     sodium chloride (PF) 0.9% PF flush 3 mL     sodium chloride (PF) 0.9% PF flush 3 mL     sodium chloride 0.9% (bottle) irrigation     [START ON 10/24/2020] sodium phosphate (FLEET ENEMA) 1 enema     tranexamic acid (CYKLOKAPRON) bolus 1 g vial attach to NaCl 50 or 100 mL bag ADULT     No current outpatient medications on file.       Current Diet  Orders Placed This Encounter      Regular Diet Adult    Assessment:    1)  Type 2 diabetes; poor control A1c was 8.5 to 5.5% but may be falsely low due to new Hgb production in pregnancy.     2)  Postpartum status; POD 2  (10/22)    Plan:      -  Novolog Medium correction sliding scale insulin - ok to continue this scale    -  BG monitoring TID AC and HS     -  Metformin - continue to hold - unlikely she will need imaging, will wait until this evening and is remains stable - will order her HS dose. Creat has been stable - 0.49 this am.     -  Avoid dextrose IV containing fluids - will discuss with primary team about discontinuing the dextrose fluids order and discharge if ok    -  Hypoglycemia protocol    -  Diabetes follow up with Endocrine clinic 1-2 weeks - will place request    -  Will follow    RITA Umanzor CNP   Inpatient Diabetes Management Service  Pager - [phone]  Friday - Monday 0800 - 1600 hrs  Diabetes Management Team job code for on-call resident after hours of 1600: [phone]       I spent a total of 80 minutes bedside and on the inpatient unit managing glycemic care.  Over 50% of my time on the unit was spent counseling the patient and/or coordinating care regarding acute hyperglycemic management.  See note for details.

## 2020-10-23 NOTE — PROGRESS NOTES
Magnesium Check    S:  Denies headache, blurry vision, SOB, RUQ or epigastric pain.    O:  Patient Vitals for the past 24 hrs:   BP Temp Temp src Pulse Resp SpO2   10/23/20 0300 123/74 -- -- -- 16 97 %   10/22/20 2333 (!) 151/103 98.1  F (36.7  C) Oral -- 16 100 %   10/22/20 1942 -- 97.6  F (36.4  C) Oral -- -- --   10/22/20 1900 (!) 158/92 -- -- 54 16 100 %   10/22/20 1818 (!) 150/96 -- -- (!) 49 16 100 %   10/22/20 1800 (!) 156/87 -- -- 54 16 100 %   10/22/20 1742 (!) 155/92 98.4  F (36.9  C) Oral 54 16 100 %   10/22/20 1656 -- -- -- 54 10 100 %   10/22/20 1650 (!) 148/85 -- -- 50 18 --   10/22/20 1635 (!) 153/85 -- -- (!) 47 14 --   10/22/20 1620 (!) 153/87 -- -- 51 11 100 %   10/22/20 1605 (!) 149/88 -- -- (!) 46 15 --   10/22/20 1600 (!) 150/89 -- -- 54 25 --   10/22/20 1555 (!) 146/88 -- -- 54 29 --   10/22/20 1550 (!) 151/91 -- -- (!) 49 15 --   10/22/20 1545 (!) 157/84 -- -- (!) 46 13 --   10/22/20 1540 (!) 152/87 -- -- (!) 48 11 --   10/22/20 1535 (!) 153/85 -- -- (!) 47 14 --   10/22/20 1525 (!) 164/84 -- -- (!) 44 12 --   10/22/20 1515 (!) 163/89 -- -- 50 16 --   10/22/20 1506 (!) 169/90 -- -- (!) 49 (!) 0 100 %   10/22/20 1500 (!) 154/93 -- -- 50 16 100 %   10/22/20 1450 (!) 162/91 -- -- (!) 49 18 100 %   10/22/20 1440 (!) 159/86 -- -- (!) 47 14 100 %   10/22/20 1430 (!) 148/83 -- -- (!) 45 12 99 %   10/22/20 1420 (!) 158/91 -- -- (!) 43 12 100 %   10/22/20 1416 -- -- -- (!) 40 12 100 %   10/22/20 1415 (!) 158/91 -- -- (!) 44 23 100 %   10/22/20 1410 (!) 150/83 -- -- (!) 47 14 100 %   10/22/20 1400 (!) 145/105 -- -- (!) 45 9 100 %   10/22/20 1350 (!) 163/92 -- -- (!) 45 18 100 %   10/22/20 1345 (!) 163/92 -- -- (!) 43 12 100 %   10/22/20 1330 (!) 155/87 -- -- (!) 47 13 100 %   10/22/20 1318 -- -- -- -- -- 100 %   10/22/20 1315 (!) 163/96 -- -- 50 13 100 %   10/22/20 1300 (!) 164/104 -- -- (!) 47 19 99 %   10/22/20 1245 (!) 152/109 -- -- (!) 44 17 100 %   10/22/20 1230 (!) 147/87 -- -- (!) 43 10 100 %    10/22/20 1215 (!) 147/85 -- -- (!) 47 15 100 %   10/22/20 1200 (!) 140/93 -- -- (!) 42 16 100 %   10/22/20 1145 (!) 148/82 -- -- (!) 46 17 99 %   10/22/20 1130 (!) 144/99 97.6  F (36.4  C) Oral -- -- 99 %   10/22/20 0700 (!) 140/94 98.8  F (37.1  C) Oral -- 16 --       General: AAOx3, NAD  CV: RRR, no murmurs, rubs, or gallops  Resp: CTAB, no wheezes, rales, or rhonchi  Ext: Reflexes 1+ brachial bilaterally, no clonus; trace edema on bilateral LE    UOP: 1450 over last 4 hours    Labs:  Hemoglobin   Date Value Ref Range Status   10/19/2020 11.2 (L) 11.7 - 15.7 g/dL Final   10/14/2020 11.8 11.7 - 15.7 g/dL Final   10/09/2020 11.4 (L) 11.7 - 15.7 g/dL Final   2020 13.4 11.7 - 15.7 g/dL Final     Platelet Count   Date Value Ref Range Status   10/19/2020 238 150 - 450 10e9/L Final   10/14/2020 233 150 - 450 10e9/L Final   10/09/2020 229 150 - 450 10e9/L Final   2020 276 150 - 450 10e9/L Final     AST   Date Value Ref Range Status   10/22/2020 15 0 - 45 U/L Final   10/14/2020 16 0 - 45 U/L Final   10/09/2020 16 0 - 45 U/L Final   2020 15 0 - 45 U/L Final     ALT   Date Value Ref Range Status   10/22/2020 13 0 - 50 U/L Final   10/14/2020 15 0 - 50 U/L Final   10/09/2020 18 0 - 50 U/L Final   2020 35 0 - 50 U/L Final     Creatinine   Date Value Ref Range Status   10/22/2020 0.49 (L) 0.52 - 1.04 mg/dL Final   10/14/2020 0.72 0.52 - 1.04 mg/dL Final   10/09/2020 0.67 0.52 - 1.04 mg/dL Final   2020 0.52 0.52 - 1.04 mg/dL Final       A/P: 40 year old  POD#1 s/p PLTCS on magnesium for seizure prophylaxis in the setting of pre-eclampsia with severe features. Pregnancy otherwise notable for T2DM.    # Pre-eclampsia with severe features.  - Mag started at prior to CS; currently running @ 2g/hr  - discontinue Mag 24h postpartum   -HELLP labs wnl on admission. Plan for repeat tomorrow AM or sooner with worsening symptoms  - Multiple sustained severe range BP on admission, required IV labetalol  20 mg x1 (1400) and patient had significant bradycrdia with HR 40-50. Will only use IR nifedipine or IV hydralazine for further sustained SR BP.   - BP normal to mild range  - Medications: She was restarted on her PTA nifedipine 30 mg after her surgery; now D#1 nifedipine 60 mg   -UOP adequate.   -Continue q4hr Mag checks    # T2DM  - PTA Lantus 18U AM, Aspart 4/5/5 held  - [facility] BG    Yesi Roberts MD  Obstetrics & Gynecology, PGY-2  10/23/20 4:15 AM

## 2020-10-23 NOTE — PROGRESS NOTES
Magnesium Check    S: Pt just tried eating for the first time since delivery and vomited after eating.  She no longer feels nauseous and otherwise feels well. Denies headache, blurry vision, SOB, RUQ or epigastric pain.    O:  Patient Vitals for the past 24 hrs:   BP Temp Temp src Pulse Resp SpO2   10/22/20 1818 (!) 150/96 -- -- (!) 49 16 100 %   10/22/20 1800 (!) 156/87 -- -- 54 16 100 %   10/22/20 1742 (!) 155/92 98.4  F (36.9  C) Oral 54 16 100 %   10/22/20 1656 -- -- -- 54 10 100 %   10/22/20 1650 (!) 148/85 -- -- 50 18 --   10/22/20 1635 (!) 153/85 -- -- (!) 47 14 --   10/22/20 1620 (!) 153/87 -- -- 51 11 100 %   10/22/20 1605 (!) 149/88 -- -- (!) 46 15 --   10/22/20 1600 (!) 150/89 -- -- 54 25 --   10/22/20 1555 (!) 146/88 -- -- 54 29 --   10/22/20 1550 (!) 151/91 -- -- (!) 49 15 --   10/22/20 1545 (!) 157/84 -- -- (!) 46 13 --   10/22/20 1540 (!) 152/87 -- -- (!) 48 11 --   10/22/20 1535 (!) 153/85 -- -- (!) 47 14 --   10/22/20 1525 (!) 164/84 -- -- (!) 44 12 --   10/22/20 1515 (!) 163/89 -- -- 50 16 --   10/22/20 1506 (!) 169/90 -- -- (!) 49 (!) 0 100 %   10/22/20 1500 (!) 154/93 -- -- 50 16 100 %   10/22/20 1450 (!) 162/91 -- -- (!) 49 18 100 %   10/22/20 1440 (!) 159/86 -- -- (!) 47 14 100 %   10/22/20 1430 (!) 148/83 -- -- (!) 45 12 99 %   10/22/20 1420 (!) 158/91 -- -- (!) 43 12 100 %   10/22/20 1416 -- -- -- (!) 40 12 100 %   10/22/20 1415 (!) 158/91 -- -- (!) 44 23 100 %   10/22/20 1410 (!) 150/83 -- -- (!) 47 14 100 %   10/22/20 1400 (!) 145/105 -- -- (!) 45 9 100 %   10/22/20 1350 (!) 163/92 -- -- (!) 45 18 100 %   10/22/20 1345 (!) 163/92 -- -- (!) 43 12 100 %   10/22/20 1330 (!) 155/87 -- -- (!) 47 13 100 %   10/22/20 1318 -- -- -- -- -- 100 %   10/22/20 1315 (!) 163/96 -- -- 50 13 100 %   10/22/20 1300 (!) 164/104 -- -- (!) 47 19 99 %   10/22/20 1245 (!) 152/109 -- -- (!) 44 17 100 %   10/22/20 1230 (!) 147/87 -- -- (!) 43 10 100 %   10/22/20 1215 (!) 147/85 -- -- (!) 47 15 100 %   10/22/20 1200  (!) 140/93 -- -- (!) 42 16 100 %   10/22/20 1145 (!) 148/82 -- -- (!) 46 17 99 %   10/22/20 1130 (!) 144/99 97.6  F (36.4  C) Oral -- -- 99 %   10/22/20 0700 (!) 140/94 98.8  F (37.1  C) Oral -- 16 --       General: AAOx3, NAD  CV: RRR, no murmurs, rubs, or gallops  Resp: CTAB, no wheezes, rales, or rhonchi  Ext: Reflexes 1+ brachial bilaterally, no clonus; trace edema on bilateral LE    UOP: 525 over last 4 hours    Labs:  Hemoglobin   Date Value Ref Range Status   10/19/2020 11.2 (L) 11.7 - 15.7 g/dL Final   10/14/2020 11.8 11.7 - 15.7 g/dL Final   10/09/2020 11.4 (L) 11.7 - 15.7 g/dL Final   2020 13.4 11.7 - 15.7 g/dL Final     Platelet Count   Date Value Ref Range Status   10/19/2020 238 150 - 450 10e9/L Final   10/14/2020 233 150 - 450 10e9/L Final   10/09/2020 229 150 - 450 10e9/L Final   2020 276 150 - 450 10e9/L Final     AST   Date Value Ref Range Status   10/22/2020 15 0 - 45 U/L Final   10/14/2020 16 0 - 45 U/L Final   10/09/2020 16 0 - 45 U/L Final   2020 15 0 - 45 U/L Final     ALT   Date Value Ref Range Status   10/22/2020 13 0 - 50 U/L Final   10/14/2020 15 0 - 50 U/L Final   10/09/2020 18 0 - 50 U/L Final   2020 35 0 - 50 U/L Final     Creatinine   Date Value Ref Range Status   10/22/2020 0.49 (L) 0.52 - 1.04 mg/dL Final   10/14/2020 0.72 0.52 - 1.04 mg/dL Final   10/09/2020 0.67 0.52 - 1.04 mg/dL Final   2020 0.52 0.52 - 1.04 mg/dL Final       A/P: 40 year old  POD#0 s/p PLTCS on magnesium for seizure prophylaxis in the setting of pre-eclampsia with severe features. Pregnancy otherwise notable for T2DM.    # Pre-eclampsia with severe features.  - Mag started at prior to CS; currently running @ 2g/hr  - discontinue Mag 24h postpartum  -HELLP labs wnl on admission. Plan for repeat tomorrow AM or sooner with worsening symptoms  - Multiple sustained severe range BP today, required IV labetalol 20 mg x1 (1400) and patient had significant bradcrdia with HR 40-50. Will  only use IR nifedipine or IV hydralazine for further sustained SR BP.   - BP currently high mild range. She was restarted on her PTA nifedipine 30 mg this afternoon. Will likely require uptitration.   -UOP adequate.   -Continue q4hr Mag checks    # T2DM  - PTA Lantus 18U AM, Aspart 4/5/5 held  - [facility]    Yesi Roberts MD  Obstetrics & Gynecology, PGY-2  10/22/20 7:38 PM

## 2020-10-23 NOTE — PROGRESS NOTES
10/23/20 1152   Spiritual Beliefs   In support of your spiritual health, is there someone we may contact for you? (identify all that apply)   (shs/10.22)   Visit Information   Visit Made By Staff    Type of Visit Initial   Visited Patient;Family   Interventions   Basic Spiritual Interventions    introduction/orientation to Spiritual Health Services;Assessment of spiritual needs/resources;Reflective conversation     SPIRITUAL HEALTH SERVICES  SPIRITUAL ASSESSMENT Progress Note  [facility] ([facility] [place]     REFERRAL SOURCE: Patient request for hospital  at admission.    I introduced [facility] to patient and her , Hami, with interpretative services iPad. Patient reflected on her delivery and baby's admission to the NICU. She reported that they are doing well and look forward to going home. They have three other children at home. Patient and family are [employer]. They have no particular spiritual needs at this time but would like to speak with a  if possible.  I told patient that I would let our  know of the request but also explained his limited availability.  She was understanding and appreciative of support if possible.     PLAN: Referral has been given to the . [facility] remains available for the duration of patient's hospitalization.     Dakotah Spencer MDiv.    Pager [phone]

## 2020-10-23 NOTE — PLAN OF CARE
Patient's BP is still moderately elevated. Patient is asymptomatic. Neuro reflexes WDl. Patient continues on 2g/hr magnesium. Plan of care reviewed with patient.

## 2020-10-23 NOTE — PLAN OF CARE
Patients postpartum assessment WDL. Fundus is firm with scant bleeding. Declines headache, dizziness, blurred vision, and epigastric pain. Blood pressures have been elevated for much of the night until 60 mg of nifedipine was given. All other vitals have been stable. Patient has been out of bed to see her baby in the NICU, tolerated well. Pumping but not getting anything. Is taking ibuprofen and tylenol for pain. Knows that she has oxycodone available if needed. Will continue to monitor for adequate pain control.

## 2020-10-23 NOTE — PROGRESS NOTES
Magnesium Check    S: Her nausea has improved and she finished dinner without nausea/vomiting. Reports some GERD. Denies headache, blurry vision, SOB, RUQ or epigastric pain.    O:  Patient Vitals for the past 24 hrs:   BP Temp Temp src Pulse Resp SpO2   10/22/20 2333 (!) 151/103 98.1  F (36.7  C) Oral -- 16 100 %   10/22/20 1942 -- 97.6  F (36.4  C) Oral -- -- --   10/22/20 1900 (!) 158/92 -- -- 54 16 100 %   10/22/20 1818 (!) 150/96 -- -- (!) 49 16 100 %   10/22/20 1800 (!) 156/87 -- -- 54 16 100 %   10/22/20 1742 (!) 155/92 98.4  F (36.9  C) Oral 54 16 100 %   10/22/20 1656 -- -- -- 54 10 100 %   10/22/20 1650 (!) 148/85 -- -- 50 18 --   10/22/20 1635 (!) 153/85 -- -- (!) 47 14 --   10/22/20 1620 (!) 153/87 -- -- 51 11 100 %   10/22/20 1605 (!) 149/88 -- -- (!) 46 15 --   10/22/20 1600 (!) 150/89 -- -- 54 25 --   10/22/20 1555 (!) 146/88 -- -- 54 29 --   10/22/20 1550 (!) 151/91 -- -- (!) 49 15 --   10/22/20 1545 (!) 157/84 -- -- (!) 46 13 --   10/22/20 1540 (!) 152/87 -- -- (!) 48 11 --   10/22/20 1535 (!) 153/85 -- -- (!) 47 14 --   10/22/20 1525 (!) 164/84 -- -- (!) 44 12 --   10/22/20 1515 (!) 163/89 -- -- 50 16 --   10/22/20 1506 (!) 169/90 -- -- (!) 49 (!) 0 100 %   10/22/20 1500 (!) 154/93 -- -- 50 16 100 %   10/22/20 1450 (!) 162/91 -- -- (!) 49 18 100 %   10/22/20 1440 (!) 159/86 -- -- (!) 47 14 100 %   10/22/20 1430 (!) 148/83 -- -- (!) 45 12 99 %   10/22/20 1420 (!) 158/91 -- -- (!) 43 12 100 %   10/22/20 1416 -- -- -- (!) 40 12 100 %   10/22/20 1415 (!) 158/91 -- -- (!) 44 23 100 %   10/22/20 1410 (!) 150/83 -- -- (!) 47 14 100 %   10/22/20 1400 (!) 145/105 -- -- (!) 45 9 100 %   10/22/20 1350 (!) 163/92 -- -- (!) 45 18 100 %   10/22/20 1345 (!) 163/92 -- -- (!) 43 12 100 %   10/22/20 1330 (!) 155/87 -- -- (!) 47 13 100 %   10/22/20 1318 -- -- -- -- -- 100 %   10/22/20 1315 (!) 163/96 -- -- 50 13 100 %   10/22/20 1300 (!) 164/104 -- -- (!) 47 19 99 %   10/22/20 1245 (!) 152/109 -- -- (!) 44 17 100 %    10/22/20 1230 (!) 147/87 -- -- (!) 43 10 100 %   10/22/20 1215 (!) 147/85 -- -- (!) 47 15 100 %   10/22/20 1200 (!) 140/93 -- -- (!) 42 16 100 %   10/22/20 1145 (!) 148/82 -- -- (!) 46 17 99 %   10/22/20 1130 (!) 144/99 97.6  F (36.4  C) Oral -- -- 99 %   10/22/20 0700 (!) 140/94 98.8  F (37.1  C) Oral -- 16 --       General: AAOx3, NAD  CV: RRR, no murmurs, rubs, or gallops  Resp: CTAB, no wheezes, rales, or rhonchi  Ext: Reflexes 1+ brachial bilaterally, no clonus; trace edema on bilateral LE    UOP: 1650 over last 4 hours    Labs:  Hemoglobin   Date Value Ref Range Status   10/19/2020 11.2 (L) 11.7 - 15.7 g/dL Final   10/14/2020 11.8 11.7 - 15.7 g/dL Final   10/09/2020 11.4 (L) 11.7 - 15.7 g/dL Final   2020 13.4 11.7 - 15.7 g/dL Final     Platelet Count   Date Value Ref Range Status   10/19/2020 238 150 - 450 10e9/L Final   10/14/2020 233 150 - 450 10e9/L Final   10/09/2020 229 150 - 450 10e9/L Final   2020 276 150 - 450 10e9/L Final     AST   Date Value Ref Range Status   10/22/2020 15 0 - 45 U/L Final   10/14/2020 16 0 - 45 U/L Final   10/09/2020 16 0 - 45 U/L Final   2020 15 0 - 45 U/L Final     ALT   Date Value Ref Range Status   10/22/2020 13 0 - 50 U/L Final   10/14/2020 15 0 - 50 U/L Final   10/09/2020 18 0 - 50 U/L Final   2020 35 0 - 50 U/L Final     Creatinine   Date Value Ref Range Status   10/22/2020 0.49 (L) 0.52 - 1.04 mg/dL Final   10/14/2020 0.72 0.52 - 1.04 mg/dL Final   10/09/2020 0.67 0.52 - 1.04 mg/dL Final   2020 0.52 0.52 - 1.04 mg/dL Final       A/P: 40 year old  POD#0 s/p PLTCS on magnesium for seizure prophylaxis in the setting of pre-eclampsia with severe features. Pregnancy otherwise notable for T2DM.    # Pre-eclampsia with severe features.  - Mag started at prior to CS; currently running @ 2g/hr  - discontinue Mag 24h postpartum  -HELLP labs wnl on admission. Plan for repeat tomorrow AM or sooner with worsening symptoms  - Multiple sustained  severe range BP today, required IV labetalol 20 mg x1 (1400) and patient had significant bradcrdia with HR 40-50. Will only use IR nifedipine or IV hydralazine for further sustained SR BP.   - BP currently all high mild range.   - Medications: She was restarted on her PTA nifedipine 30 mg after her surgery; increase to nifedipine 60 mg daily now.   -UOP adequate.   -Continue q4hr Mag checks    # T2DM  - PTA Lantus 18U AM, Aspart 4/5/5 held  - [facility]    Yesi Roberts MD  Obstetrics & Gynecology, PGY-2  10/23/20 12:42 AM

## 2020-10-23 NOTE — PLAN OF CARE
Vitals & PP assessments are within normal range.   Lungs clear. Voided after liu removal.   Pumping & visited her baby in NICU.     Magnesium IV infusing % will be d/nohemy @ 1530..   Pt walking in her room.   Will continue to monitor closely.       Magnesium d/nohemy at 11:48 AM per MD order- through Charge RN.

## 2020-10-24 LAB
GLUCOSE BLDC GLUCOMTR-MCNC: 110 MG/DL (ref 70–99)
GLUCOSE BLDC GLUCOMTR-MCNC: 153 MG/DL (ref 70–99)
GLUCOSE BLDC GLUCOMTR-MCNC: 82 MG/DL (ref 70–99)
GLUCOSE BLDC GLUCOMTR-MCNC: 83 MG/DL (ref 70–99)
GLUCOSE SERPL-MCNC: 79 MG/DL (ref 70–99)

## 2020-10-24 PROCEDURE — 250N000013 HC RX MED GY IP 250 OP 250 PS 637: Performed by: STUDENT IN AN ORGANIZED HEALTH CARE EDUCATION/TRAINING PROGRAM

## 2020-10-24 PROCEDURE — 36415 COLL VENOUS BLD VENIPUNCTURE: CPT | Performed by: STUDENT IN AN ORGANIZED HEALTH CARE EDUCATION/TRAINING PROGRAM

## 2020-10-24 PROCEDURE — 999N001017 HC STATISTIC GLUCOSE BY METER IP

## 2020-10-24 PROCEDURE — 120N000002 HC R&B MED SURG/OB UMMC

## 2020-10-24 PROCEDURE — 99233 SBSQ HOSP IP/OBS HIGH 50: CPT | Performed by: NURSE PRACTITIONER

## 2020-10-24 PROCEDURE — 82947 ASSAY GLUCOSE BLOOD QUANT: CPT | Performed by: STUDENT IN AN ORGANIZED HEALTH CARE EDUCATION/TRAINING PROGRAM

## 2020-10-24 RX ADMIN — IBUPROFEN 800 MG: 800 TABLET ORAL at 20:05

## 2020-10-24 RX ADMIN — ACETAMINOPHEN 975 MG: 325 TABLET, FILM COATED ORAL at 03:18

## 2020-10-24 RX ADMIN — DOCUSATE SODIUM AND SENNOSIDES 2 TABLET: 8.6; 5 TABLET ORAL at 20:05

## 2020-10-24 RX ADMIN — ACETAMINOPHEN 975 MG: 325 TABLET, FILM COATED ORAL at 22:20

## 2020-10-24 RX ADMIN — IBUPROFEN 800 MG: 800 TABLET ORAL at 06:02

## 2020-10-24 RX ADMIN — NIFEDIPINE 60 MG: 30 TABLET, FILM COATED, EXTENDED RELEASE ORAL at 07:49

## 2020-10-24 RX ADMIN — DOCUSATE SODIUM AND SENNOSIDES 2 TABLET: 8.6; 5 TABLET ORAL at 07:49

## 2020-10-24 RX ADMIN — IBUPROFEN 800 MG: 800 TABLET ORAL at 12:46

## 2020-10-24 RX ADMIN — SIMETHICONE 80 MG: 80 TABLET, CHEWABLE ORAL at 20:05

## 2020-10-24 RX ADMIN — ACETAMINOPHEN 975 MG: 325 TABLET, FILM COATED ORAL at 09:32

## 2020-10-24 RX ADMIN — ACETAMINOPHEN 975 MG: 325 TABLET, FILM COATED ORAL at 15:12

## 2020-10-24 NOTE — PLAN OF CARE
Vital signs within normal limits. Postpartum checks within normal limits.  Denies SOB, RUQ pain or discomfort, head ache. Normal reflexes, no clonus. Patient eating and drinking normally. Patient able to empty bladder independently and is up ambulating. No apparent signs of infection. Patient performing self cares, pumping independently and visits NICU. Adequate pain control. Will continue to monitor per plan.

## 2020-10-24 NOTE — PROGRESS NOTES
"[facility]     Obstetrics Post-Op / Progress Note    Assessment & Plan   Assessment:  -2 Days Post-Op  Procedure(s):   SECTION    40 year old  on POD 2 s/p PLTCS for diabetic fetopathy at 38w4d. Pregnancy complicated by chronic hypertension, obesity and type 2 DM. Postpartum course complicated by superimposed pre-eclampsia with severe features.     Plan:  Postpartum care:   - continue routine advancements, patient to shower this morning  - pain well controlled with ibuprofen and tylenol. Oxycodone ordered for breakthrough pain, patient has only taken 5mg in past 24h  - acute blood loss anemia: hgb 11.2 > +70 > Hgb 10. Asympomatic. Expected from surgery.   - voiding spontaneously  - pumping breastmilk and breastfeeding for infant in NICU    Chronic htn with superimposed pre-eclampsia with severe features:  - mild range BPs  - s/p mag x24h postpartum for seizure proph  - nifedipine XL 60mg today  - no s/sx worsening disease    Type 2 DM:  - appreciate endocrine recs     - \"Patient to discharge tomorrow      -  Novolog Medium correction sliding scale insulin      -  BG monitoring TID AC and HS      -  Metformin - continue to hold - can re-start at home    -  Will sign off  - discharge information placed in Discharge Navigator.  Don't hesitate to reach out with questions/needs  Plan for home:    -  Metformin - to re-start at home.  Asked re: refills. Primary team please place discharge order for Metformin  mg BID    -  Will continue to check BG at home at least BID and journal (recommended TID AC and HS)    -  Diabetes follow up with Endocrine clinic 1-2 weeks - will place request (she thinks she will prefer to follow at [facility] but wants referral just in case)  -     Outpatient diabetes follow up: 1-2 weeks at [facility] - clinic appt request sent\"      Anticipate discharge to home (or room in with infant in NICU) tomorrow.    Geraldine " "Veronica Ferro MD    Interval History   Doing well.  Pain is controlled.  No fevers.  No history of wound drainage, warmth or significant erythema.  Good appetite. No flatus yet. Voiding without difficulty.  Denies chest pain, shortness of breath, nausea or vomiting.  Breastfeeding and pumping for infant in NICU>    Medications     bupivacaine liposome (EXPAREL) LONG ACTING injection was administered into the infiltration site to produce postsurgical analgesia. Duration of action is up to 72 hours, and other \"marizol\" medications should not be given for 96 hours with the exception of the lidocaine 5% patch (LIDODERM) and the lidocaine 10mg in potassium infusions. This entry is for INFORMATION ONLY.       hydrALAZINE (APRESOLINE) algorithm-medication instruction       lactated ringers Stopped (10/23/20 1148)     - MEDICATION INSTRUCTIONS -       - MEDICATION INSTRUCTIONS -       NO Rho (D) immune globulin (RhoGam) needed - mother Rh POSITIVE       - MEDICATION INSTRUCTIONS -       - MEDICATION INSTRUCTIONS -       oxytocin in 0.9% NaCl 75 mL/hr (10/22/20 1530)     oxytocin in 0.9% NaCl         acetaminophen  975 mg Oral Q6H     ibuprofen  800 mg Oral Q6H     insulin aspart  1-7 Units Subcutaneous TID AC     insulin aspart  1-5 Units Subcutaneous At Bedtime     NIFEdipine ER OSMOTIC  60 mg Oral Daily     senna-docusate  1 tablet Oral BID    Or     senna-docusate  2 tablet Oral BID     sodium chloride (PF)  3 mL Intracatheter Q8H     sodium chloride (PF)  3 mL Intracatheter Q8H       Physical Exam   Temp: 98.5  F (36.9  C) Temp src: Oral BP: (!) 145/93 Pulse: 76   Resp: 20 SpO2: 99 %      Vitals:    10/23/20 0951 10/24/20 0400   Weight: 86 kg (189 lb 11.2 oz) 88.2 kg (194 lb 6 oz)     Vitals:    10/24/20 0000 10/24/20 0400 10/24/20 0746 10/24/20 1120   BP: (!) 148/85 (!) 141/90 (!) 154/94 (!) 145/93   Pulse: 54 53 55 76   Resp:  17 18 20   Temp:  98.3  F (36.8  C) 98.5  F (36.9  C)    TempSrc:  Oral Oral    SpO2:     "   Weight:  88.2 kg (194 lb 6 oz)         Vital Signs with Ranges  Temp:  [97.4  F (36.3  C)-98.5  F (36.9  C)] 98.5  F (36.9  C)  Pulse:  [53-76] 76  Resp:  [16-20] 20  BP: (131-165)/() 145/93  SpO2:  [99 %] 99 %  I/O last 3 completed shifts:  In: 3300 [P.O.:3300]  Out: 4800 [Urine:4800]    Well appearing  Uterine fundus is firm, non-tender and at the level of the umbilicus  Incision dressing in place - c/d/i  Extremities no edema, Non-tender    Data   Recent Labs   Lab Test 10/19/20  1401   ABO A   RH Pos   AS Neg     Recent Labs   Lab Test 10/23/20  0620 10/19/20  1400   HGB 10.0* 11.2*     Recent Labs   Lab Test 03/24/20  1031   RUQIGG 78

## 2020-10-24 NOTE — PROVIDER NOTIFICATION
10/23/20 2346   Provider Notification   Provider Name/Title Dr Arriaza G2   Method of Notification Electronic Page   Request Evaluate-Remote   Notification Reason Vital Signs Change        10/23/20 2346   Provider Notification   Provider Name/Title Dr Arriaza G2   Method of Notification Electronic Page   Request Evaluate-Remote   Notification Reason Vital Signs Change   Pt BP were 165/93, 15 min recheck was 146/107.  continue to watch or action recommended?

## 2020-10-24 NOTE — DISCHARGE INSTRUCTIONS
Preeclampsia   Call your doctor right away if you have any of the following:  - Edema (swelling) in your face or hands  - Rapid weight gain-about 1 pound or more in a day  - Headache  - Abdominal pain on your right side  - Vision problems (flashes or spots)  - You have questions or concerns once you return home. Birth Discharge Instructions: [place]  Actividad    No levante más de 10 libras annalisa las 6 semanas posteriores a harmon cirugía. Pida a los miembros de harmon arturo y amigos que la ayuden cuando lo necesite.    No conduzca si está tomando píldoras para el dolor recetadas por harmon médico. Puede conducir si está tomando píldoras de venta clemente para el dolor.    No chinyere ejercicio ni actividades intensas por 6 semanas. No chinyere nada que requiera un esfuerzo en el sitio de harmon cirugía.    No chinyere fuerza al usar el baño. Harmon equipo de atención podría recetarle un laxante si tiene problemas para  el intestino (estreñimiento).    Para cuidar de harmon incisión:    Mantenga la incisión limpia y seca    No empape harmon incisión en agua. No nade ni use la keila ni jacuzzis hasta que harmon incisión haya cicatrizado por completo. Puede sentarse en la keila si el nivel del agua está por debajo de harmon incisión.    Después de lavarse, seque lupe harmon incisión. Incluya la piel que podría entrar en contacto con harmon incisión.    No use agua oxigenada, gel, cremas, lociones ni ungüentos sobre harmon incisión.    Ajuste harmon ropa para evitar la presión en el sitio de harmon cirugía (compruebe el elástico en harmon ropa interior, por ejemplo)    Si tiene Steri-Strips, deje las pequeñas tiras de cinta en harmon sitio. Se caerán solas o puede quitárselas después de bandar semana.    Podría christine bandar pequeña cantidad de secreción transparente o rosada y esto es normal. Consulte con harmon proveedor de atención médica:    Si el drenaje aumenta o tiene olor.    Si tiene hinchazón, enrojecimiento o bandar erupción alrededor de la incisión.    Si tiene más dolor y harmon  medicamento para el dolor no ayuda    Si tiene fiebre de 100.4  F (38  C) o más (temperatura tomada bajo harmon lengua) con o sin escalofríos     El área alrededor de harmon incisión (herida de la cirugía) se sentirá adormecida. Bentley es normal. El adormecimiento debería desaparecer en menos de un año.       Mantenga cristela jorge alberto limpias:  Siempre lávese las jorge alberto antes de tocar harmon incisión. Bentley ayuda a reducir harmon riesgo de infección. Si cristela jorge alberto no están sucias, puede usar un gel de alcohol para limpiarse las jorge alberto. Mantenga cristela uñas cortas y limpias.   Llame a harmon proveedor de atención médica si tiene alguno de estos síntomas:    Empapa bandar toalla femenina con lashanda en el correr de 1 hora o ve coágulos más grandes que bandar pelota de golf.    Sangrado que dura más de 6 semanas.    Tiene bandar secreción vaginal que huele mal.    Dolor, calambres o sensibilidad graves en la región inferior de harmon vientre.    Necesidad más frecuente o urgente de orinar (hacer pis), o ardor al hacerlo.    Náuseas y vómitos    Enrojecimiento, hinchazón o dolor alrededor de bandar vena en harmon pierna.    Problemas para amamantar o un área enrojecida o dolorosa en harmon pecho.    Si tiene dolor en el pecho y tos o dificultad para respirar.    Problemas para manejar la tristeza, ansiedad o depresión.     Si le preocupa hacerse daño o hacerle daño al bebé, llame al médico de inmediato.    Tiene preguntas o inquietudes después de regresar a casa.       Birth Discharge Instructions  Activity    Do not lift more than 10 pounds for 6 weeks after surgery. Ask family and friends for help when you need it.    Do not drive while taking pain pills prescribed by your doctor. You may drive if taking over-the-counter pain pills.    No heavy exercise or activity for 6 weeks. Don t do anything that will put a strain on your surgery site.    Don t strain when using the toilet. Your care team may prescribe a stool softener if you have problems with your bowel movements  (constipation).    To care for your incision:    Keep the incision clean and dry    Do not soak your incision in water. No swimming or hot tubs until your incision has fully healed. You may soak in the bathtub if the water level is below your incision.    After washing, dry your incision well. Include the skin that may come in contact with your incision.    Do not use any peroxide, gel, cream, lotion, or ointment on your incision.     Adjust your clothes to avoid pressure on your surgery site (check the elastic in your underwear for example)    If you have Steri-Strips, leave the small strips of tape in place. They will fall off on their own, or you can remove them after one week.    You may see a small amount of clear or pink drainage and this is normal. Check with your health care provider:    If the drainage increases or has an odor.    If you have swelling, redness, or a rash around the incision.    If you have increased pain and your pain medicine doesn t help    If you have a fever of 100.4  F (38  C) or higher (temperature taken under your tongue), with or without chills   The area around your incision (surgery wound) will feel numb. This is normal. The numbness should go away in less than a year.   Keep your hands clean:   Always wash your hands before touching your incision. This helps reduce your risk of infection. If your hands aren t dirty, you may use an alcohol hand-rub to clean your hands. Keep your nails clean and short.     Call your health care provider if you have any of these symptoms:    You soak a sanitary pad with blood within 1 hour, or you see blood clots larger than a golf ball.    Bleeding that lasts more than 6 weeks.    You have vaginal discharge that smells bad.    Severe pain, cramping or tenderness in your lower belly area.    A more frequent or urgent need to urinate (pee), or it burns when you pee.    Nausea and vomiting.    Redness, swelling or pain around a vein in your  leg.    Problems breastfeeding, or a red or painful area on your breast.    If you have chest pain and cough or are gasping for air.    Problems coping with sadness, anxiety, or depression.     If you have any concerns about hurting yourself of the baby, call your doctor right away.    You have questions or concerns after you return home.Postop  Birth Instructions    Activity       Do not lift more than 10 pounds for 6 weeks after surgery.  Ask family and friends for help when you need it.    No driving until you have stopped taking your pain medications (usually two weeks after surgery).    No heavy exercise or activity for 6 weeks.  Don't do anything that will put a strain on your surgery site.    Don't strain when using the toilet.  Your care team may prescribe a stool softener if you have problems with your bowel movements.     To care for your incision:       Keep the incision clean and dry.    Do not soak your incision in water. No swimming or hot tubs until it has fully healed. You may soak in the bathtub if the water level is below your incision.    Do not use peroxide, gel, cream, lotion, or ointment on your incision.    Adjust your clothes to avoid pressure on your surgery site (check the elastic in your underwear for example).     You may see a small amount of clear or pink drainage and this is normal.  Check with your health care provider:       If the drainage increases or has an odor.    If the incision reddens, you have swelling, or develop a rash.    If you have increased pain and the medicine we prescribed doesn't help.    If you have a fever above 100.4 F (38 C) with or without chills when placing thermometer under your tongue.   The area around your incision (surgery wound), will feel numb.  This is normal. The numbness should go away in less than a year.     Keep your hands clean:  Always wash your hands before touching your incision (surgery wound). This helps reduce your risk of  infection. If your hands aren't dirty, you may use an alcohol hand-rub to clean your hands. Keep your nails clean and short.    Call your healthcare provider if you have any of these symptoms:       You soak a sanitary pad with blood within 1 hour, or you see blood clots larger than a golf ball.    Bleeding that lasts more than 6 weeks.    Vaginal discharge that smells bad.    Severe pain, cramping or tenderness in your lower belly area.    A need to urinate more frequently (use the toilet more often), more urgently (use the toilet very quickly), or it burns when you urinate.    Nausea and vomiting.    Redness, swelling or pain around a vein in your leg.    Problems breastfeeding or a red or painful area on your breast.    Chest pain and cough or are gasping for air.    Problems with coping with sadness, anxiety or depression. If you have concerns about hurting yourself or the baby, call your provider immediately.      You have questions or concerns after you return home.              IP Diabetes Management Team Discharge Instructions    Glucose Control Regimen: Resume home Metformin  mg twice daily    Blood Glucose Checks: three times daily before meals, and at bedtime. Keep a log for follow up at clinic  Endocrinology Outpatient follow up: An appointment request was sent to the [facility] coordinator to schedule your outpatient diabetes appointment 1-2 weeks from discharge. Please call the clinic at [phone] if you do not have an appointment scheduled on discharge, or if you have non-urgent questions regarding your blood sugars or insulin.     If you have urgent questions or concerns regarding your blood sugars or insulin, you may contact [phone] (the main hospital ). Ask to speak with the endocrinologist on call.    Thank you for letting the Diabetes Management Team be involved in your care!

## 2020-10-24 NOTE — LACTATION NOTE
"This note was copied from a baby's chart.  D:  I met with Gloria via .  She takes Nifedipine (Hale L2) for hypertension (which provider may changed to a different medication this admission), has diet controlled diabetes/using insulin in pregnancy, and history of Metformin use (Hale L1). She has no history of breast/chest surgery or trauma.  She has already started to pump.   I:  I gave her a folder of introductory materials and went over pumping guidelines.  I reviewed physiology of colostrum and milk production, pumping guidelines, and I gave her a log and encouraged her to use it.   I explained how to access the videos \"Hand Expression\" and \"Maximizing Milk Production\"; as well as other helpful books and websites.   We discussed hands-on pumping techniques and usefulness of a hands-free pumping bra.  We discussed skin to skin holding and how to reach your breastfeeding goals.  She verbalized understanding via teachback. She requests a pump before discharge.   A:  Mom has information she needs to initiate her supply.   P:  Will continue to provide lactation support.   IRAIDA Min, RN, IBCLC      "

## 2020-10-24 NOTE — PLAN OF CARE
Vitals & post mag assessments are within normal range. Lungs clear.   Reflexes normal & has no clonus. Incision clean & dry.   Pt denies HA, visual changes & URQ  discomfort.   Voiding large amounts. Passing flatus. No BM as of yet.   Pumping & visiting infant in NICU.   Continue on supportive cares.

## 2020-10-24 NOTE — PLAN OF CARE
Data: Vital signs except BP still high and Postpartum checks WDL  Patient eating and drinking normally. Patient able to empty bladder independently and is up ambulating.  Patient performing self cares and is able to visit for infant in NICU. Pumping but not getting any.  Action: Patient medicated during the shift for pain with Tylenol and Ibuprofen with relief after 1 hour. Patient education done see education record.  Response: Support persons not present.    Plan: Continue with the plan of care

## 2020-10-24 NOTE — PROGRESS NOTES
Diabetes Consult Daily  Progress Note          Assessment/Plan:     HPI:  Gloria Escobar is a 40 year old post partum, T2DM who presented to [facility] for delivery on 10/22.  Admitted with concerns for preeclampsia w severe features - mag was started prior to  which was discontinued yesterday on POD #2.    Assessment:     1)  Type 2 diabetes; poor control A1c was 8.5 to 5.5% but may be falsely low due to new Hgb production in pregnancy.      2)  Postpartum status; POD #3   (10/22)     Plan:       -  Patient to discharge tomorrow      -  Novolog Medium correction sliding scale insulin      -  BG monitoring TID AC and HS      -  Metformin - continue to hold - can re-start at home    -  Will sign off  - discharge information placed in Discharge Navigator.  Don't hesitate to reach out with questions/needs     Plan for home:      -  Metformin - to re-start at home.  Asked re: refills. Primary team please place discharge order for Metformin  mg BID    -  Will continue to check BG at home at least BID and journal (recommended TID AC and HS)      -  Diabetes follow up with Endocrine clinic 1-2 weeks - will place request (she thinks she will prefer to follow at [place] but wants referral just in case)     Outpatient diabetes follow up: 1-2 weeks at [facility] - clinic appt request sent    Plan discussed with patient, bedside RN, and primary team.           Interval History:   The last 24 hours progress and nursing notes reviewed.  No acute events this interval.  BG trending on lower end 79 - 107 with no insulin or metformin. Will have patient re-start at home. Wants a refill (pharmacy ID as [facility] on ).  Has been eating well.   She is ready to go home but OB would like her to stay another day and she is fine with this.    Recent Labs   Lab 10/24/20  0756 10/24/20  0621 10/23/20  2339 10/23/20  1818 10/23/20  1155 10/23/20  0730 10/23/20  0311    GLC  --  79  --   --   --   --   --    BGM 83  --  107* 92 153* 118* 129*           Nutrition:     Orders Placed This Encounter      Regular Diet Adult          PTA Regimen:     Metformin 500 mg XR BID          Review of Systems:   CC: mild throat discomfort and incisional pain at  site    Constitutional:   No fever, no chills  ENT/Mouth:   No hearing changes, no ear pain, no rhinorrhea, no difficulty swallowing  Eyes:  No eye pain, no discharge, no vision changes  CV:   No CP/SOB, no new edema  Resp:  No cough, no wheezing  GI:   No nausea or vomiting, yes - mild constipation, no diarrhea  :  No dysuria, no frequency, no hematuria  Musk:  No joint swelling/pain, No back pain  Skin/heme:   No new rashes/bruises/open areas.  No pruritis  Neuro:   No new weakness, denies numbness/tingling, no headache  Psych:   No new anxiety, no depression  Endocrine:  No polyuria, no polydipsia         Medications:   Steroid planning:  No       Physical Exam:   BP (!) 154/94   Pulse 55   Temp 98.5  F (36.9  C) (Oral)   Resp 18   Wt 88.2 kg (194 lb 6 oz)   LMP 2020   SpO2 99%   Breastfeeding Unknown   BMI 30.90 kg/m      General:  pleasant, in no acute distress. Sitting comfortably in bed. Jiust finished breakfast  HEENT:  NC/AT. MMM, sclera not injected  Lungs:  unremarkable, no new cough, no SOB  ABD:   soft  Skin:  warm and dry, no obvious lesions/rash  Feet:    CMS intact, PPP  MSK:   moving all extremities  Lymp:   trace LE edema  Mental Status:  Alert and oriented x3  Psych:   Cooperative, good eye contact, full affect          Data:     Lab Results   Component Value Date    A1C 5.5 10/09/2020    A1C 8.5 2020        CBC RESULTS:   Recent Labs   Lab Test 10/23/20  0620 10/19/20  1400   WBC  --  8.2   RBC  --  3.66*   HGB 10.0* 11.2*   HCT  --  32.9*   MCV  --  90   MCH  --  30.6   MCHC  --  34.0   RDW  --  13.4    238     Recent Labs   Lab Test 10/24/20  0621 10/23/20  0620 10/22/20  1421  04/24/18 2329 04/24/18 2329   NA  --   --   --   --  138   POTASSIUM  --   --   --   --  4.0   CHLORIDE  --   --   --   --  103   CO2  --   --   --   --  24   ANIONGAP  --   --   --   --  11   GLC 79  --   --   --  393*   BUN  --   --   --   --  14   CR  --  0.49* 0.49*   < > 0.55   SWATHI  --   --   --   --  8.7    < > = values in this interval not displayed.     Liver Function Studies -   Recent Labs   Lab Test 10/23/20  0620   AST 16   ALT 14     RITA Umanzor CNP   Inpatient Diabetes Management Service  Pager - [phone]  Friday - Monday 0800 - 1600 hrs  Diabetes Management Team job code: [id number]     I spent a total of 35 minutes bedside and on the inpatient unit managing glycemic care.  Over 50% of my time on the unit was spent counseling the patient and/or coordinating care regarding acute hyperglycemic management.  See note for details.

## 2020-10-25 LAB
GLUCOSE BLDC GLUCOMTR-MCNC: 110 MG/DL (ref 70–99)
GLUCOSE BLDC GLUCOMTR-MCNC: 121 MG/DL (ref 70–99)
GLUCOSE BLDC GLUCOMTR-MCNC: 124 MG/DL (ref 70–99)
GLUCOSE BLDC GLUCOMTR-MCNC: 99 MG/DL (ref 70–99)

## 2020-10-25 PROCEDURE — 250N000013 HC RX MED GY IP 250 OP 250 PS 637: Performed by: OBSTETRICS & GYNECOLOGY

## 2020-10-25 PROCEDURE — 120N000002 HC R&B MED SURG/OB UMMC

## 2020-10-25 PROCEDURE — 250N000013 HC RX MED GY IP 250 OP 250 PS 637: Performed by: STUDENT IN AN ORGANIZED HEALTH CARE EDUCATION/TRAINING PROGRAM

## 2020-10-25 PROCEDURE — 999N001017 HC STATISTIC GLUCOSE BY METER IP

## 2020-10-25 RX ORDER — PNV NO.95/FERROUS FUM/FOLIC AC 28MG-0.8MG
1 TABLET ORAL DAILY
Qty: 100 TABLET | Refills: 3 | Status: SHIPPED | OUTPATIENT
Start: 2020-10-25

## 2020-10-25 RX ORDER — METFORMIN HCL 500 MG
500 TABLET, EXTENDED RELEASE 24 HR ORAL 2 TIMES DAILY WITH MEALS
Qty: 60 TABLET | Refills: 3 | Status: SHIPPED | OUTPATIENT
Start: 2020-10-25

## 2020-10-25 RX ORDER — OXYCODONE HYDROCHLORIDE 5 MG/1
5 TABLET ORAL EVERY 6 HOURS PRN
Qty: 6 TABLET | Refills: 0 | Status: SHIPPED | OUTPATIENT
Start: 2020-10-25 | End: 2020-12-30

## 2020-10-25 RX ORDER — AMOXICILLIN 250 MG
1 CAPSULE ORAL 2 TIMES DAILY PRN
Qty: 100 TABLET | Refills: 0 | Status: SHIPPED | OUTPATIENT
Start: 2020-10-25 | End: 2020-12-30

## 2020-10-25 RX ORDER — IBUPROFEN 600 MG/1
600 TABLET, FILM COATED ORAL EVERY 6 HOURS PRN
Qty: 60 TABLET | Refills: 0 | Status: SHIPPED | OUTPATIENT
Start: 2020-10-25

## 2020-10-25 RX ORDER — ACETAMINOPHEN 325 MG/1
650 TABLET ORAL EVERY 6 HOURS PRN
Qty: 100 TABLET | Refills: 0 | Status: SHIPPED | OUTPATIENT
Start: 2020-10-25

## 2020-10-25 RX ADMIN — METFORMIN HYDROCHLORIDE 500 MG: 500 TABLET ORAL at 18:05

## 2020-10-25 RX ADMIN — IBUPROFEN 800 MG: 800 TABLET ORAL at 07:46

## 2020-10-25 RX ADMIN — NIFEDIPINE 60 MG: 30 TABLET, FILM COATED, EXTENDED RELEASE ORAL at 07:46

## 2020-10-25 RX ADMIN — DOCUSATE SODIUM AND SENNOSIDES 2 TABLET: 8.6; 5 TABLET ORAL at 07:46

## 2020-10-25 RX ADMIN — ACETAMINOPHEN 975 MG: 325 TABLET, FILM COATED ORAL at 05:23

## 2020-10-25 RX ADMIN — IBUPROFEN 800 MG: 800 TABLET ORAL at 02:11

## 2020-10-25 RX ADMIN — ACETAMINOPHEN 975 MG: 325 TABLET, FILM COATED ORAL at 12:38

## 2020-10-25 RX ADMIN — SIMETHICONE 80 MG: 80 TABLET, CHEWABLE ORAL at 19:16

## 2020-10-25 RX ADMIN — DOCUSATE SODIUM AND SENNOSIDES 2 TABLET: 8.6; 5 TABLET ORAL at 19:16

## 2020-10-25 RX ADMIN — IBUPROFEN 800 MG: 800 TABLET ORAL at 17:16

## 2020-10-25 RX ADMIN — ACETAMINOPHEN 975 MG: 325 TABLET, FILM COATED ORAL at 18:05

## 2020-10-25 NOTE — PLAN OF CARE
Data: Postpartum checks WDL. BP still slightly elevated but no complain of HA or vision problem.  Patient eating and drinking normally. Patient able to empty bladder independently and is up ambulating.  I&O done. Wt is done and decreasing. Patient performing self cares and is able to care for infant in NICU. Pumping independently.   Action: Patient medicated during the shift for pain with Tylenol and Ibuprofen with relief after 1 hour. Patient education done see education record.  Response: Support persons not present.    Plan: Continue with the plan of care

## 2020-10-25 NOTE — LACTATION NOTE
D:  I met with mom with  iPad.  I:  I dispensed a Pump in Style and instructed her in its use.  I reviewed the new handout from the [facility] on keeping breast pumps/parts clean.  She is making large puddles on Maintain setting.  She would like to work on latching.  A:  Mom has information and equipment she needs to initiate her supply and has updated cleaning guidelines  P:  Will continue to provide lactation support.    Wilma Castanon, RNC, IBCLC

## 2020-10-25 NOTE — PLAN OF CARE
Vital signs within normal limits. Postpartum checks within normal limits  Patient eating and drinking normally. Patient able to empty bladder independently and is up ambulating. Adequate pain control. Deangelo DARDEN, RUQ pain/discomfort, SOB. Normal reflexes, no clonus. Pumping independently. Visits infant at the NICU. Will continue to monitor per plan.

## 2020-10-25 NOTE — PLAN OF CARE
Stable postpartum. Incision pain managed with medication. Denies HA, blurred  vision. Ambulating in room and urinating without issue.

## 2020-10-25 NOTE — PROGRESS NOTES
[facility] Obstetrics Post-Op  Section Progress Note     Postop day # 3    SUBJECTIVE:   No complaints.  Baby is still in the NICU. She is feeling well. Pumping for baby.   Bleeding is appropriate.  No HA.          Physical Exam:     Vitals:    10/25/20 0523 10/25/20 0738 10/25/20 0740 10/25/20 1042   BP: (!) 149/90 (!) 156/91 (!) 151/86 133/87   Pulse: 61 58  69   Resp: 18 18     Temp: 98.8  F (37.1  C) 97.9  F (36.6  C)     TempSrc: Oral Oral     SpO2:       Weight: 86 kg (189 lb 8 oz)         Gen:  NAD,   normal respiratory and cardiovascular effort   ABD:  Soft, NT   Incision: dry and intact  Uterine fundus is firm, non-tender and at the level of the umbilicus  bilateral LE's: trace edema, nontender    Hemoglobin   Date Value Ref Range Status   10/23/2020 10.0 (L) 11.7 - 15.7 g/dL Final   10/19/2020 11.2 (L) 11.7 - 15.7 g/dL Final              Assessment and Plan:    POD # 3 with chronic htn and preE with severe features -- BP still elevated but mostly MR, will monitor today and titrate nifedipine up prn   Routine postop cares   Plan D/C  Later today or tomorrow pending BP's.     Katarina Matthews MD

## 2020-10-25 NOTE — PROGRESS NOTES
[facility] Obstetrics   Postpartum Progress Note    Name: Gloria Escobar  : 1980  MRN: [id number]    POD#4 s/p PLTCS    S: Patient is resting comfortably.  Pain is well controlled.  Bleeding is light. Ambulating without difficulty.  Tolerating a regular diet without nausea/vomiting.  Denies headache, vision changes, CP, SOB or RUQ pain.  She is pumping because baby is in NICU. Regarding birth control, the patient would like to wait and talk to her partner first about it.      O:  Patient Vitals for the past 24 hrs:   BP Temp Temp src Pulse Resp Weight   10/26/20 0500 -- -- -- -- -- 85.5 kg (188 lb 7.9 oz)   10/26/20 0400 (!) 155/100 98.9  F (37.2  C) Oral 63 16 --   10/26/20 0000 (!) 148/89 98.1  F (36.7  C) Oral 68 16 --   10/25/20 2117 (!) 148/85 98.5  F (36.9  C) Oral 65 18 --   10/25/20 1828 (!) 147/89 -- -- 67 20 --   10/25/20 1708 (!) 144/94 98.5  F (36.9  C) Oral 69 18 --   10/25/20 1042 133/87 -- -- 69 -- --   10/25/20 0740 (!) 151/86 -- -- -- -- --   10/25/20 0738 (!) 156/91 97.9  F (36.6  C) Oral 58 18 --       Gen: NAD. Alert, oriented. Resting comfortably in bed.  CV: regular rate  Resp:  CTAB  Abd: Soft, appropriately tender, fundus firm at 2 cm below the umbilicus, non-distended  Incision: Steri-strips in place, C/D/I, no drainage or surrounding erythema   Ext: trace lower extremity edema bilaterally     Labs:   HGB  Recent Labs   Lab 10/23/20  0620 10/19/20  1400   HGB 10.0* 11.2*     Recent Labs   Lab 10/25/20  2211 10/25/20  1705 10/25/20  1232 10/25/20  0745 10/5 10/24/20  1851 10/24/20  0621 10/24/20  0621   GLC  --   --   --   --   --   --   --  79   * 124* 121* 99 153* 110*   < >  --     < > = values in this interval not displayed.       A/P: Gloria Escobar is a 40 year old  female, now POD#4 s/p PLTCS for suspected fetal macrosomia in setting of type 2 DM and preeclampsia with SF. Postpartum course complicated by increasing blood pressure  control needs.       Postpartum Care  - Rh pos, Rubella immune, Hep BSAg NR   - Pain: Continue ibuprofen/Tylenol scheduled.  Roxicodone PRN for breakthrough.   - ABLA: Hgb 11.2 > +70> POD#1 Hgb 10.  - Incision: C/D/I   - FEN/GI: regular diet, stool softeners PRN  - : voiding spontaneously  - Contraception: planning to wait for 6 week post partum visit; reviewed recommendations for 18 month inter-pregnancy interval   - Pumping  - Baby in NICU with hypoglycemia     Pre-eclampsia with severe features.  - s/p 24 h magnesium   - HELLP labs wnl, last 10/23  - No sustained SR since admission, has had 2 high mild range blood pressures in last 24 hours  - D#4 nifedipine 60 mg daily, plan to increase to 90 mg daily today   - UOP 3.9 L yesterday, diuresing adequately; weight 2 kg down from POD#1 to POD#3 (no POD#0 weight)       T2DM  - MSSI BG, aQC/HS BG checks while inpatient   - S/p endocrine consult, recommended restarting metformin  mg BID at time of discharge with plan for follow up in 2 weeks     Dispo: anticipate discharge to home pending blood pressure control.     Yesi Roberts MD  Obstetrics & Gynecology, PGY-2  10/26/20 7:07 AM     Patient seen and examined by me, agree with above resident note. Overall doing well.  BPs remained in the high midl range with rare severe range.  Nifedipine increased to 90mg today.  She strongly desires discharge home today, and is otherwise meeting all goals.  Will monitor BPs this am and if normal or mild range on 90mg, will discharge home later today.  Instructions given.  RTC 1-2 weeks for BP check.    ELIZABETH KHAN MD

## 2020-10-26 ENCOUNTER — ALLIED HEALTH/NURSE VISIT (OUTPATIENT)
Dept: INTERPRETER SERVICES | Facility: CLINIC | Age: 40
End: 2020-10-26
Payer: COMMERCIAL

## 2020-10-26 VITALS
OXYGEN SATURATION: 97 % | TEMPERATURE: 98 F | DIASTOLIC BLOOD PRESSURE: 81 MMHG | RESPIRATION RATE: 16 BRPM | BODY MASS INDEX: 29.97 KG/M2 | SYSTOLIC BLOOD PRESSURE: 126 MMHG | WEIGHT: 188.49 LBS | HEART RATE: 71 BPM

## 2020-10-26 LAB
GLUCOSE BLDC GLUCOMTR-MCNC: 111 MG/DL (ref 70–99)
GLUCOSE BLDC GLUCOMTR-MCNC: 113 MG/DL (ref 70–99)

## 2020-10-26 PROCEDURE — 999N001017 HC STATISTIC GLUCOSE BY METER IP

## 2020-10-26 PROCEDURE — 250N000013 HC RX MED GY IP 250 OP 250 PS 637: Performed by: STUDENT IN AN ORGANIZED HEALTH CARE EDUCATION/TRAINING PROGRAM

## 2020-10-26 PROCEDURE — 250N000013 HC RX MED GY IP 250 OP 250 PS 637: Performed by: OBSTETRICS & GYNECOLOGY

## 2020-10-26 PROCEDURE — T1013 SIGN LANG/ORAL INTERPRETER: HCPCS | Mod: U3,TEL

## 2020-10-26 RX ORDER — NIFEDIPINE 90 MG/1
90 TABLET, FILM COATED, EXTENDED RELEASE ORAL DAILY
Qty: 90 TABLET | Refills: 1 | Status: SHIPPED | OUTPATIENT
Start: 2020-10-27 | End: 2020-12-30

## 2020-10-26 RX ORDER — NIFEDIPINE 30 MG/1
90 TABLET, EXTENDED RELEASE ORAL DAILY
Status: DISCONTINUED | OUTPATIENT
Start: 2020-10-26 | End: 2020-10-26 | Stop reason: HOSPADM

## 2020-10-26 RX ADMIN — ACETAMINOPHEN 975 MG: 325 TABLET, FILM COATED ORAL at 06:06

## 2020-10-26 RX ADMIN — IBUPROFEN 800 MG: 800 TABLET ORAL at 12:51

## 2020-10-26 RX ADMIN — IBUPROFEN 800 MG: 800 TABLET ORAL at 06:06

## 2020-10-26 RX ADMIN — ACETAMINOPHEN 975 MG: 325 TABLET, FILM COATED ORAL at 00:08

## 2020-10-26 RX ADMIN — NIFEDIPINE 90 MG: 30 TABLET, FILM COATED, EXTENDED RELEASE ORAL at 08:53

## 2020-10-26 RX ADMIN — METFORMIN HYDROCHLORIDE 500 MG: 500 TABLET ORAL at 08:56

## 2020-10-26 RX ADMIN — ACETAMINOPHEN 975 MG: 325 TABLET, FILM COATED ORAL at 12:50

## 2020-10-26 RX ADMIN — IBUPROFEN 800 MG: 800 TABLET ORAL at 00:08

## 2020-10-26 RX ADMIN — DOCUSATE SODIUM AND SENNOSIDES 2 TABLET: 8.6; 5 TABLET ORAL at 08:04

## 2020-10-26 NOTE — PROVIDER NOTIFICATION
10/26/20 0419   Provider Notification   Provider Name/Title Dr Roberts   Method of Notification Electronic Page   Request Evaluate in Person   Notification Reason Vital Signs Change       FYI: 0400 /100. Thanks, Pastora

## 2020-10-26 NOTE — PLAN OF CARE
Pt vital signs and assessment findings normal. BP WDL with initial check. Pt denies headache or change in vision. Slight pain at incision but manageable. Bleeding scant, fundus firm. Incision open to air with steri strips.  before meal. Up ad alyssa. Supported by spouse. Pumping independently. Discharge meds reviewed with pt. AVS reviewed and signed. Family gift given. Adequate for discharge.

## 2020-10-26 NOTE — PLAN OF CARE
Data: Vital signs within normal limits. BP elevated. 155/100. Dr. Roberts, Resident MD notified. Denies pre-e symptoms. Postpartum checks within normal limits - see flow record. Patient eating and drinking normally. Blood glucose stable. Checked before meals and at bedtime. Patient able to empty bladder independently and is up ambulating. No apparent signs of infection. Incision healing well. Steri strips intact. Patient performing self cares.  Action: Patient medicated during the shift for pain with Tylenol and Ibuprofen. See MAR. Patient reassessed within 1 hour after each medication and pain was improved - patient stated she was comfortable. Patient education done about pain management and frequency of vitals/assessments. See flow record.  Response: Baby in NICU. Utilizing breast pump independently. No support persons present overnight.  Plan: Anticipate discharge on 10/26/20. Continue POC.

## 2020-10-26 NOTE — PROGRESS NOTES
Patient still doing well and desires discharge home.  Bps stable in the normal to low mild range since increase in nifedipine to 90mg daily this am.  Ok to discharge home.  Plan for BP check in 1 week in office.    ELIZABETH KHAN MD

## 2020-10-26 NOTE — CONSULTS
Consult for diabetes education was received for Gloria Escobar, age 40, s/p low transverse [id number] with type 2 DM and obesity.  The [facility] Inpatient Diabetes [facility] is recommending:  BID glucose monitoring  Resumption of Metformin  daily  F/U with provider 2 weeks  Healthy eating/healthy actiivity    Chrissie Gomez, Diabetes CNS, [phone]

## 2020-10-26 NOTE — PROVIDER NOTIFICATION
10/26/20 1450   Provider Notification   Provider Name/Title Dr. Albarran   Method of Notification Electronic Page   Request Evaluate-Remote   Notification Reason Other      Pt interested in discharging

## 2020-10-26 NOTE — PLAN OF CARE
Vital signs and postpartum checks within normal limits. Has normal reflexes and no clonus. Pt is up in the room and did go down to NICU to visit baby. Pt is breastfeeding baby in NICU, pumping and getting up to 10 ml of breast milk. Complain of mild incisional pain. Pt has a discharge order now to go home after two blood pressure checks per MD order. Medicated pt with Ibuprofen and Tylenol for pain control. The evening RN will be reviewing the discharge instructions with pt. Continue cares.

## 2020-10-26 NOTE — PLAN OF CARE
Vital signs within normal limits. Postpartum checks within normal limits. Patient eating and drinking normally. Patient able to empty bladder independently and is up ambulating. No apparent signs of infection. Adequate pain controlled. Pumping independently. Visits infant at the NICU. Anticipates to discharge home in [place].

## 2020-10-28 PROBLEM — O99.820 GROUP B STREPTOCOCCUS CARRIER, +RV CULTURE, CURRENTLY PREGNANT: Status: RESOLVED | Noted: 2020-10-11 | Resolved: 2020-10-28

## 2020-10-28 PROBLEM — O09.529 AMA (ADVANCED MATERNAL AGE) MULTIGRAVIDA 35+: Status: RESOLVED | Noted: 2020-07-14 | Resolved: 2020-10-28

## 2020-10-29 ENCOUNTER — OFFICE VISIT (OUTPATIENT)
Dept: OBGYN | Facility: CLINIC | Age: 40
End: 2020-10-29
Payer: COMMERCIAL

## 2020-10-29 VITALS
TEMPERATURE: 98.2 F | SYSTOLIC BLOOD PRESSURE: 116 MMHG | HEART RATE: 89 BPM | BODY MASS INDEX: 29.48 KG/M2 | DIASTOLIC BLOOD PRESSURE: 71 MMHG | WEIGHT: 185.4 LBS

## 2020-10-29 DIAGNOSIS — E11.9 TYPE 2 DIABETES MELLITUS WITHOUT COMPLICATION, WITH LONG-TERM CURRENT USE OF INSULIN (H): ICD-10-CM

## 2020-10-29 DIAGNOSIS — Z79.4 TYPE 2 DIABETES MELLITUS WITHOUT COMPLICATION, WITH LONG-TERM CURRENT USE OF INSULIN (H): ICD-10-CM

## 2020-10-29 PROCEDURE — T1013 SIGN LANG/ORAL INTERPRETER: HCPCS | Mod: U3 | Performed by: OBSTETRICS & GYNECOLOGY

## 2020-10-29 PROCEDURE — 99213 OFFICE O/P EST LOW 20 MIN: CPT | Mod: 24 | Performed by: OBSTETRICS & GYNECOLOGY

## 2020-10-29 RX ORDER — NIFEDIPINE 30 MG
60 TABLET, EXTENDED RELEASE ORAL DAILY
Qty: 60 TABLET | Refills: 1 | Status: SHIPPED | OUTPATIENT
Start: 2020-10-29 | End: 2020-11-06

## 2020-10-29 NOTE — PROGRESS NOTES
CC:  Blood pressure check.    HPI:  Gloria Escobar is a 40 year old female who had primary  on 10/22/2020 for diabetic fetopathy and AC>HC, who presents today for BP check due to post-partum pre-eclampsia with severe features.  She was discharged with Nifedipine ER 90mg daily.  Since discharge, she reports she has been feeling well.  No particular questions or concerns.    ROS:  C: NEGATIVE for fever, chills, change in weight  E: NEGATIVE for vision changes or irritation  R: NEGATIVE for significant cough or SOB  CV: NEGATIVE for chest pain, palpitations or peripheral edema  GI: NEGATIVE for nausea, abdominal pain, heartburn, or change in bowel habits  N: NEGATIVE for HA    EXAM:  Blood pressure 116/71, pulse 89, temperature 98.2  F (36.8  C), temperature source Oral, weight 84.1 kg (185 lb 6.4 oz), currently breastfeeding.   BMI= Body mass index is 29.48 kg/m .  General - pleasant female in no acute distress.  Lungs -no increased work of breathing or audible wheezing  Heart -normal pulse.  No cyanosis.  Rectovaginal - deferred.  Musculoskeletal - no gross deformities.  Neurological - normal strength, sensation, and mental status.      Assessment:    Gloria Escobar is a 40 year old  who presents today to discuss blood pressure.  In the OR.    Plan:  1. Pre-eclampsia, delivered  Was discharged with nifedipine 90 mg daily.  Given improvement in blood pressure, will decrease dose to 60 mg daily.  We mutually agreed that she would take to 30 mg tablets, so she had the appropriate dosage when we decrease further.  - NIFEdipine ER (ADALAT CC) 30 MG 24 hr tablet; Take 2 tablets (60 mg) by mouth daily  Dispense: 60 tablet; Refill: 1    2. Type 2 diabetes mellitus without complication, with long-term current use of insulin (H)  Endocrine recommended restarting Metformin for her, but it was not given to her in her discharge meds despite being ordered.  Refill provided today.   Instructed her to call her endocrinologist to schedule follow-up.  - metFORMIN (GLUCOPHAGE) 500 MG tablet; Take 1 tablet (500 mg) by mouth daily (with breakfast)  Dispense: 60 tablet; Refill: 1    RTC 1w for BP check.      Geraldine Sol MD

## 2020-11-02 ENCOUNTER — APPOINTMENT (OUTPATIENT)
Dept: INTERPRETER SERVICES | Facility: CLINIC | Age: 40
End: 2020-11-02
Payer: COMMERCIAL

## 2020-11-02 NOTE — PROGRESS NOTES
"    Week of__/__/__ Date  __/_27_  Date  __/__ Date  ___29_ Date  ___30_ Date  __/__ Date  __/__ Date  __/__    Time BG Time BG Time BG Time BG Time BG Time BG Time BG     90  99  92  85  90  103  87     110  105  102  115  108  113  101     116  111  95  118  111  119  118     120  117  110    122    123       Gloria Escobar is a 40 year old female who is being evaluated via a billable telephone visit.      The patient has been notified of following:     \"This telephone visit will be conducted via a call between you and your physician/provider. We have found that certain health care needs can be provided without the need for a physical exam.  This service lets us provide the care you need with a short phone conversation.  If a prescription is necessary we can send it directly to your pharmacy.  If lab work is needed we can place an order for that and you can then stop by our lab to have the test done at a later time.    Telephone visits are billed at different rates depending on your insurance coverage. During this emergency period, for some insurers they may be billed the same as an in-person visit.  Please reach out to your insurance provider with any questions.    If during the course of the call the physician/provider feels a telephone visit is not appropriate, you will not be charged for this service.\"    Patient has given verbal consent for Telephone visit?  Yes    What phone number would you like to be contacted at? [phone]    How would you like to obtain your AVS? Mail a copy    Phone call duration: 30 minutes        Diabetes Consult Note    Gloria Escobar is a 40 year old female   who delivered via  at 38w4d on 10/22/2020 who was been referred for evaluation and management of type 2 diabetes in pregnancy.  Gloria reports that she was diagnosed with type 2 diabetes last year and he had been taking metformin but discontinued this in March as soon as " she learned that she was pregnant.  This is her first pregnancy with diabetes.  She has 3 older daughters now ages 16, 13 and 11 and denies any known elevated blood sugars during her pregnancies with these older children.  Her A1c  at OB intake was far above goal at 8.5.  She established care in our clinic with Dr. Hughes in April but then was lost to follow-up here.  Fortunately she had established care with the diabetic education and has continued to follow with them intermittently.  She last saw Jada Francis on  and at that time he had her fasting and post breakfast blood sugars at goal, but had some potentially elevated post lunch and dinner blood glucose values.  I spoke with Gloria on 10/6 and her BG were at goal on Lantus 16 units every morning and NovoLog 6 units each meal with careful diet and exercise as able.      Today Gloria tells me that she and baby are doing well at home.  Baby had low BG initially, but resolved and at recent check -up back to birth weight of 8 lbs.  She is breast feeding though supplementing a bit with formula as it took a few days for her milk to come in.   She is taking Metformin 500mg twice daily and still meeting pregnancy blood glucose goals.  She is wondering about safety of Metformin breastfeeding if should continue taking.  She  feels she is recovering well from  is continuing to take care with her diet and be active as able per OB instructions, post C=section.  She is hopeful of staying home with baby until at least January.  She found with her older children 6 weeks just wasn't quite enough.      Weight now at 185, down at least 15 pounds from when diagnosed with DM2 last year.        History of Diabetes monitoring and complications/ prevention:  CAD: No  Last eye exam results: : Not Found  Last dental exam: Prior to pregnancy  Microalbuminuria:No results found for: MICROALBUMIN  HTN: Yes, Nifedipine decreased from 90 to 60 mg at OB follow-up  yesterday.    On statin: No, lactating  On ASA: Yes  Depression: Denies      Gloria's  has a past medical history of Hypertension and Type 2 diabetes mellitus (H) (.).  Immunization History   Administered Date(s) Administered     DTaP, Unspecified 04/27/2018     HepB-Adult 04/14/2004, 06/07/2004, 04/27/2018     Influenza (IIV3) PF 10/19/2004, 10/17/2006, 10/19/2009     TDAP Vaccine (Adacel) 08/18/2020     Current Outpatient Medications   Medication     acetaminophen (TYLENOL) 325 MG tablet     acetone urine (KETOSTIX) test strip     blood glucose (ACCU-CHEK GUIDE) test strip     blood glucose (NO BRAND SPECIFIED) test strip     blood glucose monitoring (ACCU-CHEK FASTCLIX) lancets     Blood Glucose Monitoring Suppl (ACCU-CHEK GUIDE ME) w/Device KIT     ibuprofen (ADVIL/MOTRIN) 600 MG tablet     insulin pen needle (31G X 5 MM) 31G X 5 MM miscellaneous     metFORMIN (GLUCOPHAGE) 500 MG tablet     metFORMIN (GLUCOPHAGE-XR) 500 MG 24 hr tablet     NIFEdipine ER (ADALAT CC) 30 MG 24 hr tablet     NIFEdipine ER OSMOTIC (ADALAT CC) 60 MG 24 hr tablet     NIFEdipine ER OSMOTIC (ADALAT CC) 90 MG 24 hr tablet     oxyCODONE (ROXICODONE) 5 MG tablet     Prenatal Vit-Fe Fumarate-FA (PRENATAL VITAMIN) 27-0.8 MG TABS     senna-docusate (SENOKOT-S/PERICOLACE) 8.6-50 MG tablet     No current facility-administered medications for this visit.          Gloria's SH: Gloria is .  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    FH: Her mother had thyroid cancer.  Both her mother and father had diabetes.  Gloria's family history includes Diabetes in her father and mother; Thyroid Cancer in her mother.    ROS:   Patient denies any fevers, chills or sweats as well as any changes in vision, problems with floaters or field cuts in vision, pain or problems with dentition, DENES ANY  headaches.  Patient denies symptoms of hypo and hyperglycemia except as above.   Patients denies marked fatigue, cough, shortness of  "breath, chest pain or pressure.  Patient also denies current difficulties with depressed mood, anhedonia or worrying too much.        Exam:    There were no vitals taken for this visit.    General: Pleasant,  female in NAD.   Psych:  Mood is \"good,\" affect is appropriate.  Thought form and content are fluid and coherent.  Displays good insight.  HEENT: No hoarse voice is appreciated.  Resp: Easy and unlabored breathing.  No cough or sneezing.  Neuro: Alert and oriented, communicating clearly.  Rate of speech him in flexion are all within normal limits.  Exam is limited as this is a phone visit conducted to limit spread of COVID virus.    Visit conducted in [place]ian.    Wt Readings from Last 10 Encounters:   10/29/20 84.1 kg (185 lb 6.4 oz)   10/26/20 85.5 kg (188 lb 7.9 oz)   10/14/20 93.9 kg (207 lb)   10/09/20 95.6 kg (210 lb 12.8 oz)   09/22/20 93.4 kg (206 lb)   09/08/20 91.4 kg (201 lb 6.4 oz)   08/18/20 89.8 kg (198 lb)   07/21/20 89.8 kg (198 lb)   06/25/20 87.5 kg (193 lb)   05/05/20 87.6 kg (193 lb 3.2 oz)       Data:        GFR Estimate   Date Value Ref Range Status   10/23/2020 >90 >60 mL/min/[1.73_m2] Final     Comment:     Non  GFR Calc  Starting 12/18/2018, serum creatinine based estimated GFR (eGFR) will be   calculated using the Chronic Kidney Disease Epidemiology Collaboration   (CKD-EPI) equation.     10/22/2020 >90 >60 mL/min/[1.73_m2] Final     Comment:     Non  GFR Calc  Starting 12/18/2018, serum creatinine based estimated GFR (eGFR) will be   calculated using the Chronic Kidney Disease Epidemiology Collaboration   (CKD-EPI) equation.     10/14/2020 >90 >60 mL/min/[1.73_m2] Final     Comment:     Non  GFR Calc  Starting 12/18/2018, serum creatinine based estimated GFR (eGFR) will be   calculated using the Chronic Kidney Disease Epidemiology Collaboration   (CKD-EPI) equation.           Lab Results   Component Value Date    A1C 5.5 10/09/2020    A1C " 8.5 (H) 2020       Most recent eye exam date: : Not Found       Assessment/Plan:      Gloria is a 40 year old female  with a history of obesity, DM2 and hypertension who delivered via  at 38w4d on 10/22/2020.      Her diabetes was well controlled in pregnancy and remains so following with fasting and post prandial BG throughout the day at or below goal. She has lost 8.5% of her body weight since diagnosis and it is possible that her diabetes could go into remission.    At this point we discussed the evidence for weight loss and DM remission and studies for reducing obesity and diabetes in offspring and I reccommended that she continue the healthy lifestyle including physical activity and heart healthy diet that she has adapted in pregnancy.      At this time, she will reduce Metformin to 500 mg daily and continue checking BG twice daily.  If BG remains at goal she can discuss forgoing Metformin until done lactating with her primary care provider who she will see 6 weeks post partum. Guidelines suggest a 2 hour OGTT 6-12 weeks post partum, but considering COVID she can effectively do this at home by checking BG 2 hours after larger meals.  If at 6 weeks BG is normal, partuicularly if she is having hypoglycemia with nursing, I would recommend she hold Metformin until she is done lactating and continue good lifestyle management, likely with support from nutrtion.      Discussed that diabetes only goes into remission with weight loss and lifestyle management and that is our goal.   understands will continue to need A1C every 3 months, until advised less frequent monitoring  is appropriate.      She is currently due for eye exam and should have microalbumin and lipid panel check in 6-12 months when done lactating as AceI/ARB or statin therapy should be considered.      HTN: Follow with OB , PCP    Obesity: as above.      All patient questions answered to her satisfaction.      >50% of 30 minute  visit spent in face to face counseling, education and coordination of care related to options for better glycemic control as well as preventing, detecting, and treating hypoglycemia.      It is my privilege to be involved in the care of the above patient.     Sandi Mendoza PA-C, MPAS  [facility]  Diabetes, Endocrinology, and Metabolism  [phone] Appointments/Nurse  [phone] pager  [phone] nurse line    This note was completed in part using Dragon voice recognition, and may contain word and grammatical errors.

## 2020-11-03 ENCOUNTER — VIRTUAL VISIT (OUTPATIENT)
Dept: ENDOCRINOLOGY | Facility: CLINIC | Age: 40
End: 2020-11-03
Payer: COMMERCIAL

## 2020-11-03 DIAGNOSIS — Z79.4 TYPE 2 DIABETES MELLITUS WITHOUT COMPLICATION, WITH LONG-TERM CURRENT USE OF INSULIN (H): ICD-10-CM

## 2020-11-03 DIAGNOSIS — E11.9 TYPE 2 DIABETES MELLITUS WITHOUT COMPLICATION, WITH LONG-TERM CURRENT USE OF INSULIN (H): ICD-10-CM

## 2020-11-03 PROCEDURE — 99443 PR PHYSICIAN TELEPHONE EVALUATION 21-30 MIN: CPT | Performed by: PHYSICIAN ASSISTANT

## 2020-11-03 RX ORDER — BLOOD SUGAR DIAGNOSTIC
STRIP MISCELLANEOUS
Qty: 150 STRIP | Refills: 6 | Status: SHIPPED | OUTPATIENT
Start: 2020-11-03 | End: 2020-12-30

## 2020-11-03 NOTE — LETTER
"11/3/2020       RE: Gloria Escobar  [address] [facility] [address]     Dear Colleague,    Thank you for referring your patient, Gloria Escobar, to the [facility] END[facility]CRIN[facility] [place] [facility]. Please see a copy of my visit note below.        Week of__/__/__ Date  __/_27_  Date  __/_28_ Date  __/_29_ Date  __/_30_ Date  __/_31_ Date  __/1__ Date  __/_287_    Time BG Time BG Time BG Time BG Time BG Time BG Time BG     90  99  92  85  90  103  87     110  105  102  115  108  113  101     116  111  95  118  111  119  118     120  117  110    122    123       Gloria Escobar is a 40 year old female who is being evaluated via a billable telephone visit.      The patient has been notified of following:     \"This telephone visit will be conducted via a call between you and your physician/provider. We have found that certain health care needs can be provided without the need for a physical exam.  This service lets us provide the care you need with a short phone conversation.  If a prescription is necessary we can send it directly to your pharmacy.  If lab work is needed we can place an order for that and you can then stop by our lab to have the test done at a later time.    Telephone visits are billed at different rates depending on your insurance coverage. During this emergency period, for some insurers they may be billed the same as an in-person visit.  Please reach out to your insurance provider with any questions.    If during the course of the call the physician/provider feels a telephone visit is not appropriate, you will not be charged for this service.\"    Patient has given verbal consent for Telephone visit?  Yes    What phone number would you like to be contacted at? [phone]    How would you like to obtain your AVS? Mail a copy    Phone call duration: 30 minutes        Diabetes Consult Note    Gloria " Mana Escobar is a 40 year old female   who delivered via  at 38w4d on 10/22/2020 who was been referred for evaluation and management of type 2 diabetes in pregnancy.  Gloria reports that she was diagnosed with type 2 diabetes last year and he had been taking metformin but discontinued this in March as soon as she learned that she was pregnant.  This is her first pregnancy with diabetes.  She has 3 older daughters now ages 16, 13 and 11 and denies any known elevated blood sugars during her pregnancies with these older children.  Her A1c  at OB intake was far above goal at 8.5.  She established care in our clinic with Dr. Hughes in April but then was lost to follow-up here.  Fortunately she had established care with the diabetic education and has continued to follow with them intermittently.  She last saw Jada Tito on  and at that time he had her fasting and post breakfast blood sugars at goal, but had some potentially elevated post lunch and dinner blood glucose values.  I spoke with Gloria on 10/6 and her BG were at goal on Lantus 16 units every morning and NovoLog 6 units each meal with careful diet and exercise as able.      Today Gloria tells me that she and baby are doing well at home.  Baby had low BG initially, but resolved and at recent check -up back to birth weight of 8 lbs.  She is breast feeding though supplementing a bit with formula as it took a few days for her milk to come in.   She is taking Metformin 500mg twice daily and still meeting pregnancy blood glucose goals.  She is wondering about safety of Metformin breastfeeding if should continue taking.  She  feels she is recovering well from  is continuing to take care with her diet and be active as able per OB instructions, post C=section.  She is hopeful of staying home with baby until at least January.  She found with her older children 6 weeks just wasn't quite enough.      Weight now at 185,  down at least 15 pounds from when diagnosed with DM2 last year.        History of Diabetes monitoring and complications/ prevention:  CAD: No  Last eye exam results: : Not Found  Last dental exam: Prior to pregnancy  Microalbuminuria:No results found for: MICROALBUMIN  HTN: Yes, Nifedipine decreased from 90 to 60 mg at OB follow-up yesterday.    On statin: No, lactating  On ASA: Yes  Depression: Denies      Gloria's  has a past medical history of Hypertension and Type 2 diabetes mellitus (H) (.).  Immunization History   Administered Date(s) Administered     DTaP, Unspecified 04/27/2018     HepB-Adult 04/14/2004, 06/07/2004, 04/27/2018     Influenza (IIV3) PF 10/19/2004, 10/17/2006, 10/19/2009     TDAP Vaccine (Adacel) 08/18/2020     Current Outpatient Medications   Medication     acetaminophen (TYLENOL) 325 MG tablet     acetone urine (KETOSTIX) test strip     blood glucose (ACCU-CHEK GUIDE) test strip     blood glucose (NO BRAND SPECIFIED) test strip     blood glucose monitoring (ACCU-CHEK FASTCLIX) lancets     Blood Glucose Monitoring Suppl (ACCU-CHEK GUIDE ME) w/Device KIT     ibuprofen (ADVIL/MOTRIN) 600 MG tablet     insulin pen needle (31G X 5 MM) 31G X 5 MM miscellaneous     metFORMIN (GLUCOPHAGE) 500 MG tablet     metFORMIN (GLUCOPHAGE-XR) 500 MG 24 hr tablet     NIFEdipine ER (ADALAT CC) 30 MG 24 hr tablet     NIFEdipine ER OSMOTIC (ADALAT CC) 60 MG 24 hr tablet     NIFEdipine ER OSMOTIC (ADALAT CC) 90 MG 24 hr tablet     oxyCODONE (ROXICODONE) 5 MG tablet     Prenatal Vit-Fe Fumarate-FA (PRENATAL VITAMIN) 27-0.8 MG TABS     senna-docusate (SENOKOT-S/PERICOLACE) 8.6-50 MG tablet     No current facility-administered medications for this visit.          Gloria's SH: Gloria is .  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    FH: Her mother had thyroid cancer.  Both her mother and father had diabetes.  Gloria's family history includes Diabetes in her father and mother; Thyroid  "Cancer in her mother.    ROS:   Patient denies any fevers, chills or sweats as well as any changes in vision, problems with floaters or field cuts in vision, pain or problems with dentition, DENES ANY  headaches.  Patient denies symptoms of hypo and hyperglycemia except as above.   Patients denies marked fatigue, cough, shortness of breath, chest pain or pressure.  Patient also denies current difficulties with depressed mood, anhedonia or worrying too much.        Exam:    There were no vitals taken for this visit.    General: Pleasant,  female in NAD.   Psych:  Mood is \"good,\" affect is appropriate.  Thought form and content are fluid and coherent.  Displays good insight.  HEENT: No hoarse voice is appreciated.  Resp: Easy and unlabored breathing.  No cough or sneezing.  Neuro: Alert and oriented, communicating clearly.  Rate of speech him in flexion are all within normal limits.  Exam is limited as this is a phone visit conducted to limit spread of COVID virus.    Visit conducted in [place].    Wt Readings from Last 10 Encounters:   10/29/20 84.1 kg (185 lb 6.4 oz)   10/26/20 85.5 kg (188 lb 7.9 oz)   10/14/20 93.9 kg (207 lb)   10/09/20 95.6 kg (210 lb 12.8 oz)   09/22/20 93.4 kg (206 lb)   09/08/20 91.4 kg (201 lb 6.4 oz)   08/18/20 89.8 kg (198 lb)   07/21/20 89.8 kg (198 lb)   06/25/20 87.5 kg (193 lb)   05/05/20 87.6 kg (193 lb 3.2 oz)       Data:        GFR Estimate   Date Value Ref Range Status   10/23/2020 >90 >60 mL/min/[1.73_m2] Final     Comment:     Non  GFR Calc  Starting 12/18/2018, serum creatinine based estimated GFR (eGFR) will be   calculated using the Chronic Kidney Disease Epidemiology Collaboration   (CKD-EPI) equation.     10/22/2020 >90 >60 mL/min/[1.73_m2] Final     Comment:     Non  GFR Calc  Starting 12/18/2018, serum creatinine based estimated GFR (eGFR) will be   calculated using the Chronic Kidney Disease Epidemiology Collaboration   (CKD-EPI) " equation.     10/14/2020 >90 >60 mL/min/[1.73_m2] Final     Comment:     Non  GFR Calc  Starting 2018, serum creatinine based estimated GFR (eGFR) will be   calculated using the Chronic Kidney Disease Epidemiology Collaboration   (CKD-EPI) equation.           Lab Results   Component Value Date    A1C 5.5 10/09/2020    A1C 8.5 (H) 2020       Most recent eye exam date: : Not Found       Assessment/Plan:      Gloria is a 40 year old female  with a history of obesity, DM2 and hypertension who delivered via  at 38w4d on 10/22/2020.      Her diabetes was well controlled in pregnancy and remains so following with fasting and post prandial BG throughout the day at or below goal. She has lost 8.5% of her body weight since diagnosis and it is possible that her diabetes could go into remission.    At this point we discussed the evidence for weight loss and DM remission and studies for reducing obesity and diabetes in offspring and I reccommended that she continue the healthy lifestyle including physical activity and heart healthy diet that she has adapted in pregnancy.      At this time, she will reduce Metformin to 500 mg daily and continue checking BG twice daily.  If BG remains at goal she can discuss forgoing Metformin until done lactating with her primary care provider who she will see 6 weeks post partum. Guidelines suggest a 2 hour OGTT 6-12 weeks post partum, but considering COVID she can effectively do this at home by checking BG 2 hours after larger meals.  If at 6 weeks BG is normal, partuicularly if she is having hypoglycemia with nursing, I would recommend she hold Metformin until she is done lactating and continue good lifestyle management, likely with support from nutrtion.      Discussed that diabetes only goes into remission with weight loss and lifestyle management and that is our goal.  Se understands will continue to need A1C every 3 months, until advised less  frequent monitoring  is appropriate.      She is currently due for eye exam and should have microalbumin and lipid panel check in 6-12 months when done lactating as AceI/ARB or statin therapy should be considered.      HTN: Follow with OB , PCP    Obesity: as above.      All patient questions answered to her satisfaction.      >50% of 30 minute visit spent in face to face counseling, education and coordination of care related to options for better glycemic control as well as preventing, detecting, and treating hypoglycemia.      It is my privilege to be involved in the care of the above patient.     Sandi Mendoza PA-C, [facility]  [facility]  Diabetes, Endocrinology, and Metabolism  [phone] Appointments/Nurse  [phone] pager  [phone] nurse line    This note was completed in part using Dragon voice recognition, and may contain word and grammatical errors.      Sandi Mendoza PA-C

## 2020-11-06 ENCOUNTER — APPOINTMENT (OUTPATIENT)
Dept: INTERPRETER SERVICES | Facility: CLINIC | Age: 40
End: 2020-11-06
Payer: COMMERCIAL

## 2020-11-06 ENCOUNTER — OFFICE VISIT (OUTPATIENT)
Dept: OBGYN | Facility: CLINIC | Age: 40
End: 2020-11-06
Payer: COMMERCIAL

## 2020-11-06 VITALS
HEART RATE: 89 BPM | TEMPERATURE: 97.1 F | DIASTOLIC BLOOD PRESSURE: 67 MMHG | SYSTOLIC BLOOD PRESSURE: 103 MMHG | BODY MASS INDEX: 29.57 KG/M2 | WEIGHT: 186 LBS

## 2020-11-06 DIAGNOSIS — Z98.891 STATUS POST CESAREAN SECTION: ICD-10-CM

## 2020-11-06 DIAGNOSIS — E11.9 TYPE 2 DIABETES MELLITUS WITHOUT COMPLICATION, WITH LONG-TERM CURRENT USE OF INSULIN (H): ICD-10-CM

## 2020-11-06 DIAGNOSIS — Z79.4 TYPE 2 DIABETES MELLITUS WITHOUT COMPLICATION, WITH LONG-TERM CURRENT USE OF INSULIN (H): ICD-10-CM

## 2020-11-06 PROCEDURE — 99212 OFFICE O/P EST SF 10 MIN: CPT | Mod: 24 | Performed by: OBSTETRICS & GYNECOLOGY

## 2020-11-06 PROCEDURE — T1013 SIGN LANG/ORAL INTERPRETER: HCPCS | Mod: U4 | Performed by: OBSTETRICS & GYNECOLOGY

## 2020-11-06 RX ORDER — NIFEDIPINE 30 MG
30 TABLET, EXTENDED RELEASE ORAL DAILY
Qty: 60 TABLET | Refills: 1
Start: 2020-11-06 | End: 2020-12-30

## 2020-11-06 NOTE — PROGRESS NOTES
OB GYN CLINIC VISIT  2020  CC: postpartum blood pressure check    SUBJECTIVE:  Gloria Escobar is a 40 year old female  here for a postpartum blood pressure check. She had a  Section  on 10/22 delivering a healthy baby girl weighing 8 lbs 3 oz.  Postpartum course complicated by pre-eclampsia with severe features by BP criteria. She was discharged from the hospital on 10/26 taking nifedipine XL 90mg PO daily. She was seen in clinic 10/29 at which time her BP was 116/71. Nifedipine dose was decreased to 60mg PO daily. Today she presents for a recheck.     She denies headache, visual changes, right upper quadrant pain, chest pain.  She has no swelling.  She reports some discomfort along her incision but overall pain is well controlled.  Incision is healing without any drainage, redness or increased pain.  She has breast and bottlefeeding her infant.    Current Outpatient Medications   Medication     acetaminophen (TYLENOL) 325 MG tablet     acetone urine (KETOSTIX) test strip     blood glucose (ACCU-CHEK GUIDE) test strip     blood glucose (NO BRAND SPECIFIED) test strip     blood glucose monitoring (ACCU-CHEK FASTCLIX) lancets     Blood Glucose Monitoring Suppl (ACCU-CHEK GUIDE ME) w/Device KIT     ibuprofen (ADVIL/MOTRIN) 600 MG tablet     insulin pen needle (31G X 5 MM) 31G X 5 MM miscellaneous     metFORMIN (GLUCOPHAGE) 500 MG tablet     metFORMIN (GLUCOPHAGE-XR) 500 MG 24 hr tablet     NIFEdipine ER (ADALAT CC) 30 MG 24 hr tablet     NIFEdipine ER OSMOTIC (ADALAT CC) 60 MG 24 hr tablet     NIFEdipine ER OSMOTIC (ADALAT CC) 90 MG 24 hr tablet     oxyCODONE (ROXICODONE) 5 MG tablet     Prenatal Vit-Fe Fumarate-FA (PRENATAL VITAMIN) 27-0.8 MG TABS     senna-docusate (SENOKOT-S/PERICOLACE) 8.6-50 MG tablet     No current facility-administered medications for this visit.      OB History    Para Term  AB Living   4 4 4 0 0 4   SAB TAB Ectopic Multiple Live Births   0 0 0 0  4      # Outcome Date GA Lbr Joao/2nd Weight Sex Delivery Anes PTL Lv   4 Term 10/22/20 38w4d  3.74 kg (8 lb 3.9 oz) F CS-LTranv Spinal N BREANNE      Name: AUDREY TURNER,FEMALE-DANNY      Apgar1: 8  Apgar5: 9   3 Term 10/22/09 39w0d  3.629 kg (8 lb) F    BREANNE   2 Term 07 39w0d  3.629 kg (8 lb) F   N BREANNE   1 Term 04 39w0d  3.629 kg (8 lb) F   N BREANNE     Past Medical History:   Diagnosis Date     Hypertension      Type 2 diabetes mellitus (H) .     Past Surgical History:   Procedure Laterality Date      SECTION N/A 10/22/2020    Procedure:  SECTION;  Surgeon: Geraldine Ferro MD;  Location: [facility] L[facility]     CHOLECYSTECTOMY       cholesetomy           OBJECTIVE:  Blood pressure 103/67, pulse 89, temperature 97.1  F (36.2  C), temperature source Oral, weight 84.4 kg (186 lb), currently breastfeeding.   General - pleasant female in no acute distress.  Resp - normal respiratory effort  CV - normal pulses, no edema  Abdomen - soft, nontender, nondistended, fundus firm below umbilicus  Incision -well approximated, no erythema and no drainage.  Extremities-warm well perfused, no edema, no nontender    ASSESSMENT:  40 year old  with history of postpartum pre-eclampsia with severe features who is 2 weeks postpartum here for blood pressure check.    PLAN:  1. Pre-eclampsia, delivered  Recommend decrease nifedipine form 60mg PO daily to 30mg PO daily.   - NIFEdipine ER (ADALAT CC) 30 MG 24 hr tablet; Take 1 tablet (30 mg) by mouth daily  Dispense: 60 tablet; Refill: 1    2. Type 2 diabetes mellitus without complication, with long-term current use of insulin (H)  Per endocrine. Taking metformin, no insulin. BG wnl    3. Status post  section  Postpartum and postop recovery going well. Return to clinic in 4 weeks for routine postpartum visit, sooner PRN.      Geraldine Ferro MD

## 2020-12-07 ENCOUNTER — APPOINTMENT (OUTPATIENT)
Dept: INTERPRETER SERVICES | Facility: CLINIC | Age: 40
End: 2020-12-07
Payer: COMMERCIAL

## 2020-12-07 NOTE — PROGRESS NOTES
"Gloria Escobar is a 40 year old female who is being evaluated via a billable telephone visit.      The patient has been notified of following:     \"This telephone visit will be conducted via a call between you and your physician/provider. We have found that certain health care needs can be provided without the need for a physical exam.  This service lets us provide the care you need with a short phone conversation.  If a prescription is necessary we can send it directly to your pharmacy.  If lab work is needed we can place an order for that and you can then stop by our lab to have the test done at a later time.    Telephone visits are billed at different rates depending on your insurance coverage. During this emergency period, for some insurers they may be billed the same as an in-person visit.  Please reach out to your insurance provider with any questions.    If during the course of the call the physician/provider feels a telephone visit is not appropriate, you will not be charged for this service.\"    Patient has given verbal consent for Telephone visit?  {YES-NO  Default Yes:4444::\"Yes\"}    What phone number would you like to be contacted at? [phone]     How would you like to obtain your AVS? {AVS Preference:595365}    Subjective     Gloria Escobar is a 40 year old female who presents via phone visit today for the following health issues:    HPI       Concern for COVID-19  About how many days ago did these symptoms start? ***  Is this your first visit for this illness? {Yes_No branch:473784}  In the 14 days before your symptoms started, have you had close contact with someone with COVID-19 (Coronavirus)? {COVID19 CLOSE CONTACT:729269}  Do you have a fever or chills? {COVID19 FEVER:251242}  Are you having new or worsening difficulty breathing? {COVID19 DIFFICULTY BREATHIN}  Do you have new or worsening cough? {COVID19 COUGH YES:168501}  Have you had any new or " "unexplained body aches? {YES_No_***:919844}    Have you experienced any of the following NEW symptoms?    Headache: {YES_No_***:864780}    Sore throat: {YES_No_***:212304}    Loss of taste or smell: {YES_No_***:546562}    Chest pain: {YES_No_***:877032}    Diarrhea: {YES_No_***:907300}    Rash: {YES_No_***:011666}  What treatments have you tried? ***  Who do you live with? ***  Are you, or a household member, a healthcare worker or a ? {YES_No_***:623636}  Do you live in a nursing home, group home, or shelter? {YES_No_***:207460}  Do you have a way to get food/medications if quarantined? {COVID19 Quarantine Assistance:315223}    {Provider  Link to COVID SmartSet :957198}      {PEDS Chronic and Acute Problems (Optional):809132}     {additonal problems for provider to add (Optional):757784}    Review of Systems   {ROS COMP (Optional):630174}       Objective          Vitals:  No vitals were obtained today due to virtual visit.    {GENERAL APPEARANCE:50::\"healthy\",\"alert\",\"no distress\"}  PSYCH: Alert and oriented times 3; coherent speech, normal   rate and volume, able to articulate logical thoughts, able   to abstract reason, no tangential thoughts, no hallucinations   or delusions  Her affect is { :9052062::\"normal\"}  RESP: No cough, no audible wheezing, able to talk in full sentences  Remainder of exam unable to be completed due to telephone visits    {Diagnostic Test Results (Optional):315922}        Assessment/Plan:    {PROVIDER CHARTING PREFERENCE SOAPO:324871}    Phone call duration:  *** minutes              "

## 2020-12-08 ENCOUNTER — VIRTUAL VISIT (OUTPATIENT)
Dept: FAMILY MEDICINE | Facility: CLINIC | Age: 40
End: 2020-12-08
Payer: COMMERCIAL

## 2020-12-08 DIAGNOSIS — Z53.9 NO SHOW: Primary | ICD-10-CM

## 2020-12-28 ENCOUNTER — TELEPHONE (OUTPATIENT)
Dept: OBGYN | Facility: CLINIC | Age: 40
End: 2020-12-28

## 2020-12-28 NOTE — TELEPHONE ENCOUNTER
Patient missed her post partum appt. Can she get in this week before her insurance expires. She also needs diabetes test strips ordered. Please advise. Ok to leave a detailed message.  Citizen of [place] interp. Needed for call.

## 2020-12-29 ENCOUNTER — TELEPHONE (OUTPATIENT)
Dept: ENDOCRINOLOGY | Facility: CLINIC | Age: 40
End: 2020-12-29

## 2020-12-29 NOTE — TELEPHONE ENCOUNTER
Pharmacy was called and they confirm they have refills available and will prepare for her now.      Kathleen M Doege RN

## 2020-12-29 NOTE — TELEPHONE ENCOUNTER
[facility] Call Center    Phone Message    May a detailed message be left on voicemail: yes     Reason for Call: Medication Refill Request    Has the patient contacted the pharmacy for the refill? Yes   Name of medication being requested: test strips  Provider who prescribed the medication: Sandi Mendoza  Pharmacy: [facility]  Date medication is needed: 12/29/20         Action Taken: Message routed to:  [facility] [place]

## 2020-12-30 ENCOUNTER — PRENATAL OFFICE VISIT (OUTPATIENT)
Dept: OBGYN | Facility: CLINIC | Age: 40
End: 2020-12-30
Payer: COMMERCIAL

## 2020-12-30 VITALS
DIASTOLIC BLOOD PRESSURE: 84 MMHG | HEART RATE: 60 BPM | BODY MASS INDEX: 30.28 KG/M2 | WEIGHT: 192.9 LBS | SYSTOLIC BLOOD PRESSURE: 138 MMHG | TEMPERATURE: 97.3 F | HEIGHT: 67 IN

## 2020-12-30 DIAGNOSIS — O10.919 CHRONIC HYPERTENSION AFFECTING PREGNANCY: ICD-10-CM

## 2020-12-30 DIAGNOSIS — E11.9 TYPE 2 DIABETES MELLITUS WITHOUT COMPLICATION, WITH LONG-TERM CURRENT USE OF INSULIN (H): ICD-10-CM

## 2020-12-30 DIAGNOSIS — Z79.4 TYPE 2 DIABETES MELLITUS WITHOUT COMPLICATION, WITH LONG-TERM CURRENT USE OF INSULIN (H): ICD-10-CM

## 2020-12-30 PROCEDURE — 99207 PR POST PARTUM EXAM: CPT | Performed by: OBSTETRICS & GYNECOLOGY

## 2020-12-30 PROCEDURE — T1013 SIGN LANG/ORAL INTERPRETER: HCPCS | Mod: U3 | Performed by: OBSTETRICS & GYNECOLOGY

## 2020-12-30 RX ORDER — NIFEDIPINE 30 MG
30 TABLET, EXTENDED RELEASE ORAL DAILY
Qty: 60 TABLET | Refills: 1 | Status: SHIPPED | OUTPATIENT
Start: 2020-12-30

## 2020-12-30 RX ORDER — BLOOD SUGAR DIAGNOSTIC
STRIP MISCELLANEOUS
Qty: 150 STRIP | Refills: 6 | Status: SHIPPED | OUTPATIENT
Start: 2020-12-30

## 2020-12-30 ASSESSMENT — MIFFLIN-ST. JEOR: SCORE: 1569.68

## 2020-12-30 NOTE — PROGRESS NOTES
"Chief Complaint   Patient presents with     Post Partum Exam       Initial BP (!) 142/77 (BP Location: Left arm, Patient Position: Sitting, Cuff Size: Adult Regular)   Pulse 60   Temp 97.3  F (36.3  C) (Oral)   Ht 1.689 m (5' 6.5\")   Wt 87.5 kg (192 lb 14.4 oz)   Breastfeeding Yes   BMI 30.67 kg/m   Estimated body mass index is 30.67 kg/m  as calculated from the following:    Height as of this encounter: 1.689 m (5' 6.5\").    Weight as of this encounter: 87.5 kg (192 lb 14.4 oz).  BP completed using cuff size:large    Questioned patient about current smoking habits.  Pt. has never smoked.          The following HM Due: NONE      The following patient reported/Care Every where data was sent to:  P ABSTRACT QUALITY INITIATIVES [[id number]]  n/a      n/a and patient has appointment for today       SUBJECTIVE:  Danny Turner is a 40 year old female   here for a postpartum visit.  She had a  Section  on 10/22/20 delivering a healthy baby girl  at term.  Previous deliveries were vaginal so this is her first  section.   She has no complaints today.  Happy.      Pregnancy was complicated by type 2 DM on insulin and chronic htn.    She is taking nifedipine 30 mg daily. Was discharged on this.   She was checking her BG in the morning but ran out of strips. Would like more strips.  [facility].  Will go back there for ongoing management of her DM and Chronic Htn.       OB History    Para Term  AB Living   4 4 4 0 0 4   SAB TAB Ectopic Multiple Live Births   0 0 0 0 4      # Outcome Date GA Lbr Joao/2nd Weight Sex Delivery Anes PTL Lv   4 Term 10/22/20 38w4d  3.74 kg (8 lb 3.9 oz) F CS-LTranv Spinal N BREANNE      Name: AUDREY TURNER,FEMALE-DANNY      Apgar1: 8  Apgar5: 9   3 Term 10/22/09 39w0d  3.629 kg (8 lb) F    BREANNE   2 Term 07 39w0d  3.629 kg (8 lb) F   N BREANNE   1 Term 04 39w0d  3.629 kg (8 lb) F   N " "BREANNE        Has 4 daughters now.     delivery complications:  No  breast feeding:  Yes, and supplementing   bladder problems:  No  bowel problems/hemorrhoids:  No  episiotomy/laceration/incision healed? Yes  vaginal flow:  None, no period yet.   contraception:  Declined, will use condoms for now and will discuss later with her primary doc at [facility]   emotional adjustment:  doing well and happy       OBJECTIVE:  Blood pressure 138/84, pulse 60, temperature 97.3  F (36.3  C), temperature source Oral, height 1.689 m (5' 6.5\"), weight 87.5 kg (192 lb 14.4 oz), currently breastfeeding.   General - pleasant female in no acute distress.  Breast - no nodularity, asymmetry or nipple discharge bilaterally.  Abdomen - soft, nontender, nondistended, no hepatosplenomegaly.  Incision well healed   Pelvic - EG: normal adult female, BUS: within normal limits, Vagina: well rugated, no discharge, Cervix: no lesions or CMT, Uterus: firm, normal sized and nontender, Adnexae: no masses or tenderness.  Rectovaginal - deferred.    ASSESSMENT:  Encounter Diagnoses   Name Primary?     Routine postpartum follow-up Yes     Type 2 diabetes mellitus without complication, with long-term current use of insulin (H)      Pre-eclampsia, delivered      Chronic hypertension affecting pregnancy       PLAN:  Discussed need to avoid another pregnancy for minimum of 12 months.  She will use condoms for now.   May resume normal activities without restrictions  Pap smear was not  done today  She will stay on her nifedipine until she sees her primary doc.   Glc strips refilled today.  All questions answered.     Katarina Matthews MD   "

## 2022-06-13 ENCOUNTER — HOSPITAL ENCOUNTER (EMERGENCY)
Facility: CLINIC | Age: 42
Discharge: HOME | End: 2022-06-13
Attending: EMERGENCY MEDICINE | Admitting: EMERGENCY MEDICINE
Payer: MEDICAID

## 2022-06-13 VITALS
DIASTOLIC BLOOD PRESSURE: 83 MMHG | HEART RATE: 83 BPM | BODY MASS INDEX: 31.65 KG/M2 | OXYGEN SATURATION: 99 % | RESPIRATION RATE: 18 BRPM | WEIGHT: 190 LBS | SYSTOLIC BLOOD PRESSURE: 143 MMHG | TEMPERATURE: 98.3 F | HEIGHT: 65 IN

## 2022-06-13 DIAGNOSIS — K61.0 PERIANAL ABSCESS: ICD-10-CM

## 2022-06-13 PROCEDURE — 250N000009 HC RX 250

## 2022-06-13 PROCEDURE — 99284 EMERGENCY DEPT VISIT MOD MDM: CPT | Mod: 25 | Performed by: EMERGENCY MEDICINE

## 2022-06-13 PROCEDURE — 46050 I&D PERIANAL ABSCESS SUPFC: CPT | Performed by: EMERGENCY MEDICINE

## 2022-06-13 PROCEDURE — 250N000013 HC RX MED GY IP 250 OP 250 PS 637: Performed by: EMERGENCY MEDICINE

## 2022-06-13 PROCEDURE — 76857 US EXAM PELVIC LIMITED: CPT | Mod: 26 | Performed by: EMERGENCY MEDICINE

## 2022-06-13 PROCEDURE — 76857 US EXAM PELVIC LIMITED: CPT | Performed by: EMERGENCY MEDICINE

## 2022-06-13 RX ORDER — SULFAMETHOXAZOLE/TRIMETHOPRIM 800-160 MG
1 TABLET ORAL ONCE
Status: COMPLETED | OUTPATIENT
Start: 2022-06-13 | End: 2022-06-13

## 2022-06-13 RX ORDER — CEPHALEXIN 500 MG/1
500 CAPSULE ORAL ONCE
Status: COMPLETED | OUTPATIENT
Start: 2022-06-13 | End: 2022-06-13

## 2022-06-13 RX ORDER — CEPHALEXIN 500 MG/1
500 CAPSULE ORAL 4 TIMES DAILY
Qty: 28 CAPSULE | Refills: 0 | Status: ON HOLD | OUTPATIENT
Start: 2022-06-13 | End: 2022-06-17

## 2022-06-13 RX ORDER — SULFAMETHOXAZOLE/TRIMETHOPRIM 800-160 MG
1 TABLET ORAL 2 TIMES DAILY
Qty: 14 TABLET | Refills: 0 | Status: SHIPPED | OUTPATIENT
Start: 2022-06-13 | End: 2022-06-20

## 2022-06-13 RX ORDER — LIDOCAINE HYDROCHLORIDE AND EPINEPHRINE 10; 10 MG/ML; UG/ML
INJECTION, SOLUTION INFILTRATION; PERINEURAL
Status: COMPLETED
Start: 2022-06-13 | End: 2022-06-13

## 2022-06-13 RX ADMIN — LIDOCAINE HYDROCHLORIDE AND EPINEPHRINE: 10; 10 INJECTION, SOLUTION INFILTRATION; PERINEURAL at 05:11

## 2022-06-13 RX ADMIN — CEPHALEXIN 500 MG: 500 CAPSULE ORAL at 05:23

## 2022-06-13 RX ADMIN — SULFAMETHOXAZOLE AND TRIMETHOPRIM 1 TABLET: 800; 160 TABLET ORAL at 05:23

## 2022-06-13 NOTE — ED PROVIDER NOTES
ED Provider Note  [facility]      History     Chief Complaint   Patient presents with     Foot Pain     lump on L foot, limp     Mass     Butt cheek, feels pressure     HPI  Gloria Escobar is a 42 year old female who has a PMHx of HTN, DM2 presenting with pain in her buttocks.  This is the reason she came in.  She also had some feeling that her left foot was asleep however this is gone.  Regarding the buttock pain this is been present since Wednesday.  Denies fevers, chills, nausea, vomiting.  Denies drainage of the area.  Denies trouble defecating, pain with defecating, black or bloody stools.  She is otherwise been feeling well.  Has been taking her medications.  Denies chest pain, shortness of breath or abdominal pain.  She has never had cellulitis or an abscess that she knows of.    Past Medical History  Past Medical History:   Diagnosis Date     Hypertension      Type 2 diabetes mellitus (H) .     Past Surgical History:   Procedure Laterality Date      SECTION N/A 10/22/2020    Procedure:  SECTION;  Surgeon: Geraldine Ferro MD;  Location: [facility]     CHOLECYSTECTOMY       cholesetomy       cephALEXin (KEFLEX) 500 MG capsule  sulfamethoxazole-trimethoprim (BACTRIM DS) 800-160 MG tablet  acetaminophen (TYLENOL) 325 MG tablet  blood glucose (ACCU-CHEK GUIDE) test strip  blood glucose (NO BRAND SPECIFIED) test strip  blood glucose monitoring (ACCU-CHEK FASTCLIX) lancets  Blood Glucose Monitoring Suppl (ACCU-CHEK GUIDE ME) w/Device KIT  ibuprofen (ADVIL/MOTRIN) 600 MG tablet  insulin pen needle (31G X 5 MM) 31G X 5 MM miscellaneous  metFORMIN (GLUCOPHAGE-XR) 500 MG 24 hr tablet  NIFEdipine ER (ADALAT CC) 30 MG 24 hr tablet  Prenatal Vit-Fe Fumarate-FA (PRENATAL VITAMIN) 27-0.8 MG TABS      No Known Allergies  Family History  Family History   Problem Relation Age of Onset     Diabetes Mother         type 2      Thyroid Cancer Mother      Diabetes  "Father         type 2     Social History   Social History     Substance Use Topics     Alcohol use: No     Drug use: No      Past medical history, past surgical history, medications, allergies, family history, and social history were reviewed with the patient. No additional pertinent items.       Review of Systems  A complete review of systems was performed with pertinent positives and negatives noted in the HPI, and all other systems negative.    Physical Exam   BP: (!) 143/83  Pulse: 83  Temp: 98.3  F (36.8  C)  Resp: 18  Height: 165.1 cm (5' 5\")  Weight: 86.2 kg (190 lb)  SpO2: 99 %  Physical Exam  Physical Exam   Constitutional: oriented to person, place, and time. appears well-developed and well-nourished.   HENT:   Head: Normocephalic and atraumatic.   Neck: Normal range of motion.   Pulmonary/Chest: Effort normal. No respiratory distress.   Cardiac: No murmurs, rubs, gallops. RRR.  Abdominal: Abdomen soft, nontender, nondistended. No rebound tenderness.  MSK: Long bones without deformity or evidence of trauma  Neurological: alert and oriented to person, place, and time.   Skin: Skin is warm and dry.   Psychiatric:  normal mood and affect.  behavior is normal. Thought content normal.   : There is an area of erythema and fluctuance in the right perianal area.  Hemorrhoid present.  Erythema extends up to buttock on the right side.    ED [facility]    PROCEDURE: -Incision/Drainage    Date/Time: 6/13/2022 5:29 AM  Performed by: Filiberto Arevalo MD  Authorized by: Filiberto Arevalo MD     Risks, benefits and alternatives discussed.      LOCATION:      Type:  Abscess    Location:  Anogenital    Anogenital location:  Perianal    PRE-PROCEDURE DETAILS:     Skin preparation:  Betadine    PROCEDURE TYPE:     Complexity:  Simple    ANESTHESIA (see MAR for exact dosages):     Anesthesia method:  Local infiltration    Local anesthetic:  Lidocaine 1% " WITH epi    PROCEDURE DETAILS:     Needle aspiration: no      Incision types:  Cruciate    Incision depth:  Dermal    Scalpel blade:  11    Wound management:  Probed and deloculated    Drainage:  Purulent    Drainage amount:  Moderate    Wound treatment:  Wound left open    Packing materials:  None    PROCEDURE    Patient Tolerance:  Patient tolerated the procedure well with no immediate complications         No results found for any visits on 06/13/22.  Medications   sulfamethoxazole-trimethoprim (BACTRIM DS) 800-160 MG per tablet 1 tablet (1 tablet Oral Given 6/13/22 0523)   cephALEXin (KEFLEX) capsule 500 mg (500 mg Oral Given 6/13/22 0523)   lidocaine 1% with EPINEPHrine 1:100,000 1 %-1:[id number] injection (  Given by Other 6/13/22 0511)        Assessments & Plan (with Medical Decision Making)   MDM   patient presenting with concern for pain in her buttock.  She does have a perianal abscess.  There is not appear to be deep infection on examination.  This is incised and drained at the bedside, please see note above.  This is not a very large abscess I did not pack it.  Due to surrounding erythema and cellulitis on the buttock she was placed on antibiotics.  She is given information regarding cleaning.  The patient will follow up with primary care provider or return if worsening.  She is otherwise appears quite well, has no symptoms of systemic infection and vitals are stable.  Do not feel imaging or labs are necessary at this point further than what I have done today.    I have reviewed the nursing notes. I have reviewed the findings, diagnosis, plan and need for follow up with the patient.    New Prescriptions    No medications on file       Final diagnoses:   Perianal abscess       --  Filiberto Arevalo  [facility] EMERGENCY DEPARTMENT  6/13/2022     Filiberto Arevalo MD  06/13/22 0535

## 2022-06-13 NOTE — DISCHARGE INSTRUCTIONS
Please make an appointment to follow up with Your Primary Care Provider in 2-3 days.    Return to the emergency department if you start to develop fevers, chills, nausea, vomiting or if you have any other further concerns    If Gloria has discomfort from fever or other pain, she can have:  Acetaminophen (Tylenol) every 6 hours as needed. Her dose is:    2 regular strength tabs (650 mg)                                     (43.2+ kg/96+ lb)    NOTE: If your acetaminophen (Tylenol) came with a dropper marked with 0.4 and 0.8 ml, call us ([phone]) or check with your doctor about the dose before using it.     AND/OR      Ibuprofen (Advil, Motrin) every 6 hours as needed. His/her dose is:    2 regular strength tabs (400 mg)                                                                         (40-60 kg/ lb)

## 2022-06-14 ENCOUNTER — HOSPITAL ENCOUNTER (INPATIENT)
Facility: CLINIC | Age: 42
LOS: 2 days | Discharge: HOME | End: 2022-06-17
Attending: EMERGENCY MEDICINE | Admitting: SURGERY
Payer: MEDICAID

## 2022-06-14 DIAGNOSIS — M79.89 NECROTIZING SOFT TISSUE INFECTION: ICD-10-CM

## 2022-06-14 DIAGNOSIS — Z20.822 LAB TEST NEGATIVE FOR COVID-19 VIRUS: ICD-10-CM

## 2022-06-14 LAB
ANION GAP SERPL CALCULATED.3IONS-SCNC: 10 MMOL/L (ref 3–14)
BASOPHILS # BLD AUTO: 0.1 10E3/UL (ref 0–0.2)
BASOPHILS NFR BLD AUTO: 1 %
BUN SERPL-MCNC: 14 MG/DL (ref 7–30)
CALCIUM SERPL-MCNC: 8.9 MG/DL (ref 8.5–10.1)
CHLORIDE BLD-SCNC: 99 MMOL/L (ref 94–109)
CO2 SERPL-SCNC: 24 MMOL/L (ref 20–32)
CREAT SERPL-MCNC: 0.64 MG/DL (ref 0.52–1.04)
EOSINOPHIL # BLD AUTO: 0.1 10E3/UL (ref 0–0.7)
EOSINOPHIL NFR BLD AUTO: 1 %
ERYTHROCYTE [DISTWIDTH] IN BLOOD BY AUTOMATED COUNT: 11.9 % (ref 10–15)
GFR SERPL CREATININE-BSD FRML MDRD: >90 ML/MIN/1.73M2
GLUCOSE BLD-MCNC: 229 MG/DL (ref 70–99)
HCG UR QL: NEGATIVE
HCT VFR BLD AUTO: 33.6 % (ref 35–47)
HGB BLD-MCNC: 11.7 G/DL (ref 11.7–15.7)
IMM GRANULOCYTES # BLD: 0.5 10E3/UL
IMM GRANULOCYTES NFR BLD: 2 %
INTERNAL QC OK POCT: NORMAL
LYMPHOCYTES # BLD AUTO: 2.8 10E3/UL (ref 0.8–5.3)
LYMPHOCYTES NFR BLD AUTO: 12 %
MCH RBC QN AUTO: 31.4 PG (ref 26.5–33)
MCHC RBC AUTO-ENTMCNC: 34.8 G/DL (ref 31.5–36.5)
MCV RBC AUTO: 90 FL (ref 78–100)
MONOCYTES # BLD AUTO: 1.3 10E3/UL (ref 0–1.3)
MONOCYTES NFR BLD AUTO: 5 %
NEUTROPHILS # BLD AUTO: 19.4 10E3/UL (ref 1.6–8.3)
NEUTROPHILS NFR BLD AUTO: 79 %
NRBC # BLD AUTO: 0 10E3/UL
NRBC BLD AUTO-RTO: 0 /100
PLATELET # BLD AUTO: 287 10E3/UL (ref 150–450)
POCT KIT EXPIRATION DATE: 0
POCT KIT LOT NUMBER: 0
POTASSIUM BLD-SCNC: 3.2 MMOL/L (ref 3.4–5.3)
RBC # BLD AUTO: 3.73 10E6/UL (ref 3.8–5.2)
SODIUM SERPL-SCNC: 133 MMOL/L (ref 133–144)
WBC # BLD AUTO: 24.3 10E3/UL (ref 4–11)

## 2022-06-14 PROCEDURE — 85025 COMPLETE CBC W/AUTO DIFF WBC: CPT | Performed by: EMERGENCY MEDICINE

## 2022-06-14 PROCEDURE — 83605 ASSAY OF LACTIC ACID: CPT | Performed by: EMERGENCY MEDICINE

## 2022-06-14 PROCEDURE — 36415 COLL VENOUS BLD VENIPUNCTURE: CPT | Performed by: EMERGENCY MEDICINE

## 2022-06-14 PROCEDURE — 87040 BLOOD CULTURE FOR BACTERIA: CPT | Performed by: EMERGENCY MEDICINE

## 2022-06-14 PROCEDURE — 250N000011 HC RX IP 250 OP 636: Performed by: EMERGENCY MEDICINE

## 2022-06-14 PROCEDURE — 258N000003 HC RX IP 258 OP 636: Performed by: EMERGENCY MEDICINE

## 2022-06-14 PROCEDURE — 81025 URINE PREGNANCY TEST: CPT | Performed by: EMERGENCY MEDICINE

## 2022-06-14 PROCEDURE — 82550 ASSAY OF CK (CPK): CPT | Performed by: EMERGENCY MEDICINE

## 2022-06-14 PROCEDURE — 80048 BASIC METABOLIC PNL TOTAL CA: CPT | Performed by: EMERGENCY MEDICINE

## 2022-06-14 RX ORDER — HYDROMORPHONE HYDROCHLORIDE 1 MG/ML
0.5 INJECTION, SOLUTION INTRAMUSCULAR; INTRAVENOUS; SUBCUTANEOUS
Status: COMPLETED | OUTPATIENT
Start: 2022-06-14 | End: 2022-06-14

## 2022-06-14 RX ORDER — ONDANSETRON 2 MG/ML
4 INJECTION INTRAMUSCULAR; INTRAVENOUS EVERY 30 MIN PRN
Status: DISCONTINUED | OUTPATIENT
Start: 2022-06-14 | End: 2022-06-15 | Stop reason: DRUGHIGH

## 2022-06-14 RX ADMIN — HYDROMORPHONE HYDROCHLORIDE 0.5 MG: 1 INJECTION, SOLUTION INTRAMUSCULAR; INTRAVENOUS; SUBCUTANEOUS at 23:04

## 2022-06-14 RX ADMIN — SODIUM CHLORIDE 1000 ML: 9 INJECTION, SOLUTION INTRAVENOUS at 23:17

## 2022-06-14 ASSESSMENT — ENCOUNTER SYMPTOMS: WOUND: 1

## 2022-06-15 ENCOUNTER — ANESTHESIA (OUTPATIENT)
Dept: SURGERY | Facility: CLINIC | Age: 42
End: 2022-06-15
Payer: MEDICAID

## 2022-06-15 ENCOUNTER — ANESTHESIA EVENT (OUTPATIENT)
Dept: SURGERY | Facility: CLINIC | Age: 42
End: 2022-06-15
Payer: MEDICAID

## 2022-06-15 ENCOUNTER — APPOINTMENT (OUTPATIENT)
Dept: CT IMAGING | Facility: CLINIC | Age: 42
End: 2022-06-15
Attending: EMERGENCY MEDICINE
Payer: MEDICAID

## 2022-06-15 PROBLEM — M79.89 NECROTIZING SOFT TISSUE INFECTION: Status: ACTIVE | Noted: 2022-06-15

## 2022-06-15 LAB
ABO/RH(D): NORMAL
ANTIBODY SCREEN: NEGATIVE
CK SERPL-CCNC: 114 U/L (ref 30–225)
CREAT SERPL-MCNC: 0.54 MG/DL (ref 0.52–1.04)
GFR SERPL CREATININE-BSD FRML MDRD: >90 ML/MIN/1.73M2
GLUCOSE BLDC GLUCOMTR-MCNC: 194 MG/DL (ref 70–99)
GLUCOSE BLDC GLUCOMTR-MCNC: 227 MG/DL (ref 70–99)
GLUCOSE BLDC GLUCOMTR-MCNC: 230 MG/DL (ref 70–99)
GLUCOSE BLDC GLUCOMTR-MCNC: 235 MG/DL (ref 70–99)
GLUCOSE BLDC GLUCOMTR-MCNC: 250 MG/DL (ref 70–99)
GLUCOSE BLDC GLUCOMTR-MCNC: 283 MG/DL (ref 70–99)
GRAM STAIN RESULT: ABNORMAL
HOLD SPECIMEN: NORMAL
INR PPP: 1.14 (ref 0.85–1.15)
LACTATE SERPL-SCNC: 0.9 MMOL/L (ref 0.7–2)
RADIOLOGIST FLAGS: ABNORMAL
SARS-COV-2 RNA RESP QL NAA+PROBE: NEGATIVE
SPECIMEN EXPIRATION DATE: NORMAL

## 2022-06-15 PROCEDURE — 0J990ZZ DRAINAGE OF BUTTOCK SUBCUTANEOUS TISSUE AND FASCIA, OPEN APPROACH: ICD-10-PCS | Performed by: SURGERY

## 2022-06-15 PROCEDURE — 86901 BLOOD TYPING SEROLOGIC RH(D): CPT | Performed by: EMERGENCY MEDICINE

## 2022-06-15 PROCEDURE — 85610 PROTHROMBIN TIME: CPT | Performed by: EMERGENCY MEDICINE

## 2022-06-15 PROCEDURE — 258N000003 HC RX IP 258 OP 636: Performed by: EMERGENCY MEDICINE

## 2022-06-15 PROCEDURE — 250N000013 HC RX MED GY IP 250 OP 250 PS 637: Performed by: STUDENT IN AN ORGANIZED HEALTH CARE EDUCATION/TRAINING PROGRAM

## 2022-06-15 PROCEDURE — 250N000012 HC RX MED GY IP 250 OP 636 PS 637: Performed by: STUDENT IN AN ORGANIZED HEALTH CARE EDUCATION/TRAINING PROGRAM

## 2022-06-15 PROCEDURE — 82565 ASSAY OF CREATININE: CPT | Performed by: STUDENT IN AN ORGANIZED HEALTH CARE EDUCATION/TRAINING PROGRAM

## 2022-06-15 PROCEDURE — 11042 DBRDMT SUBQ TIS 1ST 20SQCM/<: CPT | Mod: XS | Performed by: STUDENT IN AN ORGANIZED HEALTH CARE EDUCATION/TRAINING PROGRAM

## 2022-06-15 PROCEDURE — 250N000011 HC RX IP 250 OP 636: Performed by: STUDENT IN AN ORGANIZED HEALTH CARE EDUCATION/TRAINING PROGRAM

## 2022-06-15 PROCEDURE — 87075 CULTR BACTERIA EXCEPT BLOOD: CPT | Performed by: SURGERY

## 2022-06-15 PROCEDURE — U0005 INFEC AGEN DETEC AMPLI PROBE: HCPCS | Performed by: STUDENT IN AN ORGANIZED HEALTH CARE EDUCATION/TRAINING PROGRAM

## 2022-06-15 PROCEDURE — 99291 CRITICAL CARE FIRST HOUR: CPT | Mod: 25 | Performed by: EMERGENCY MEDICINE

## 2022-06-15 PROCEDURE — 999N000141 HC STATISTIC PRE-PROCEDURE NURSING ASSESSMENT: Performed by: SURGERY

## 2022-06-15 PROCEDURE — 86850 RBC ANTIBODY SCREEN: CPT | Performed by: EMERGENCY MEDICINE

## 2022-06-15 PROCEDURE — 36415 COLL VENOUS BLD VENIPUNCTURE: CPT | Performed by: STUDENT IN AN ORGANIZED HEALTH CARE EDUCATION/TRAINING PROGRAM

## 2022-06-15 PROCEDURE — 120N000002 HC R&B MED SURG/OB UMMC

## 2022-06-15 PROCEDURE — 99285 EMERGENCY DEPT VISIT HI MDM: CPT | Mod: 25 | Performed by: EMERGENCY MEDICINE

## 2022-06-15 PROCEDURE — 96365 THER/PROPH/DIAG IV INF INIT: CPT | Mod: 59 | Performed by: EMERGENCY MEDICINE

## 2022-06-15 PROCEDURE — 72193 CT PELVIS W/DYE: CPT

## 2022-06-15 PROCEDURE — 87077 CULTURE AEROBIC IDENTIFY: CPT | Performed by: SURGERY

## 2022-06-15 PROCEDURE — C9803 HOPD COVID-19 SPEC COLLECT: HCPCS | Performed by: EMERGENCY MEDICINE

## 2022-06-15 PROCEDURE — 250N000009 HC RX 250: Performed by: EMERGENCY MEDICINE

## 2022-06-15 PROCEDURE — 258N000003 HC RX IP 258 OP 636: Performed by: STUDENT IN AN ORGANIZED HEALTH CARE EDUCATION/TRAINING PROGRAM

## 2022-06-15 PROCEDURE — 710N000010 HC RECOVERY PHASE 1, LEVEL 2, PER MIN: Performed by: SURGERY

## 2022-06-15 PROCEDURE — 370N000017 HC ANESTHESIA TECHNICAL FEE, PER MIN: Performed by: SURGERY

## 2022-06-15 PROCEDURE — 36415 COLL VENOUS BLD VENIPUNCTURE: CPT | Performed by: EMERGENCY MEDICINE

## 2022-06-15 PROCEDURE — 87205 SMEAR GRAM STAIN: CPT | Performed by: SURGERY

## 2022-06-15 PROCEDURE — 99222 1ST HOSP IP/OBS MODERATE 55: CPT | Mod: 57 | Performed by: SURGERY

## 2022-06-15 PROCEDURE — 87070 CULTURE OTHR SPECIMN AEROBIC: CPT | Performed by: SURGERY

## 2022-06-15 PROCEDURE — 250N000011 HC RX IP 250 OP 636: Performed by: EMERGENCY MEDICINE

## 2022-06-15 PROCEDURE — 11004 DBRDMT SKIN XTRNL GENT&PER: CPT | Mod: GC | Performed by: STUDENT IN AN ORGANIZED HEALTH CARE EDUCATION/TRAINING PROGRAM

## 2022-06-15 PROCEDURE — 250N000009 HC RX 250: Performed by: STUDENT IN AN ORGANIZED HEALTH CARE EDUCATION/TRAINING PROGRAM

## 2022-06-15 PROCEDURE — 360N000075 HC SURGERY LEVEL 2, PER MIN: Performed by: SURGERY

## 2022-06-15 PROCEDURE — 272N000001 HC OR GENERAL SUPPLY STERILE: Performed by: SURGERY

## 2022-06-15 RX ORDER — ONDANSETRON 2 MG/ML
4 INJECTION INTRAMUSCULAR; INTRAVENOUS EVERY 6 HOURS PRN
Status: DISCONTINUED | OUTPATIENT
Start: 2022-06-15 | End: 2022-06-17 | Stop reason: HOSPADM

## 2022-06-15 RX ORDER — PIPERACILLIN SODIUM, TAZOBACTAM SODIUM 3; .375 G/15ML; G/15ML
3.38 INJECTION, POWDER, LYOPHILIZED, FOR SOLUTION INTRAVENOUS ONCE
Status: COMPLETED | OUTPATIENT
Start: 2022-06-15 | End: 2022-06-15

## 2022-06-15 RX ORDER — PROCHLORPERAZINE MALEATE 5 MG
10 TABLET ORAL EVERY 6 HOURS PRN
Status: DISCONTINUED | OUTPATIENT
Start: 2022-06-15 | End: 2022-06-17 | Stop reason: HOSPADM

## 2022-06-15 RX ORDER — PIPERACILLIN SODIUM, TAZOBACTAM SODIUM 3; .375 G/15ML; G/15ML
3.38 INJECTION, POWDER, LYOPHILIZED, FOR SOLUTION INTRAVENOUS EVERY 6 HOURS
Status: DISCONTINUED | OUTPATIENT
Start: 2022-06-15 | End: 2022-06-17

## 2022-06-15 RX ORDER — PROPOFOL 10 MG/ML
INJECTION, EMULSION INTRAVENOUS CONTINUOUS PRN
Status: DISCONTINUED | OUTPATIENT
Start: 2022-06-15 | End: 2022-06-15

## 2022-06-15 RX ORDER — HYDROMORPHONE HYDROCHLORIDE 1 MG/ML
0.4 INJECTION, SOLUTION INTRAMUSCULAR; INTRAVENOUS; SUBCUTANEOUS
Status: DISCONTINUED | OUTPATIENT
Start: 2022-06-15 | End: 2022-06-17

## 2022-06-15 RX ORDER — LISINOPRIL 20 MG/1
20 TABLET ORAL DAILY
COMMUNITY

## 2022-06-15 RX ORDER — NALOXONE HYDROCHLORIDE 0.4 MG/ML
0.4 INJECTION, SOLUTION INTRAMUSCULAR; INTRAVENOUS; SUBCUTANEOUS
Status: DISCONTINUED | OUTPATIENT
Start: 2022-06-15 | End: 2022-06-17 | Stop reason: HOSPADM

## 2022-06-15 RX ORDER — HYDROMORPHONE HYDROCHLORIDE 1 MG/ML
0.2 INJECTION, SOLUTION INTRAMUSCULAR; INTRAVENOUS; SUBCUTANEOUS EVERY 5 MIN PRN
Status: DISCONTINUED | OUTPATIENT
Start: 2022-06-15 | End: 2022-06-15 | Stop reason: HOSPADM

## 2022-06-15 RX ORDER — LIDOCAINE HYDROCHLORIDE 20 MG/ML
INJECTION, SOLUTION INFILTRATION; PERINEURAL PRN
Status: DISCONTINUED | OUTPATIENT
Start: 2022-06-15 | End: 2022-06-15

## 2022-06-15 RX ORDER — LIDOCAINE 40 MG/G
CREAM TOPICAL
Status: DISCONTINUED | OUTPATIENT
Start: 2022-06-15 | End: 2022-06-17 | Stop reason: HOSPADM

## 2022-06-15 RX ORDER — OXYCODONE HYDROCHLORIDE 5 MG/1
5 TABLET ORAL EVERY 4 HOURS PRN
Status: DISCONTINUED | OUTPATIENT
Start: 2022-06-15 | End: 2022-06-17 | Stop reason: HOSPADM

## 2022-06-15 RX ORDER — HYDROMORPHONE HYDROCHLORIDE 1 MG/ML
0.2 INJECTION, SOLUTION INTRAMUSCULAR; INTRAVENOUS; SUBCUTANEOUS
Status: DISCONTINUED | OUTPATIENT
Start: 2022-06-15 | End: 2022-06-17

## 2022-06-15 RX ORDER — FENTANYL CITRATE 50 UG/ML
25 INJECTION, SOLUTION INTRAMUSCULAR; INTRAVENOUS
Status: DISCONTINUED | OUTPATIENT
Start: 2022-06-15 | End: 2022-06-15 | Stop reason: HOSPADM

## 2022-06-15 RX ORDER — ONDANSETRON 4 MG/1
4 TABLET, ORALLY DISINTEGRATING ORAL EVERY 6 HOURS PRN
Status: DISCONTINUED | OUTPATIENT
Start: 2022-06-15 | End: 2022-06-17 | Stop reason: HOSPADM

## 2022-06-15 RX ORDER — NALOXONE HYDROCHLORIDE 0.4 MG/ML
0.2 INJECTION, SOLUTION INTRAMUSCULAR; INTRAVENOUS; SUBCUTANEOUS
Status: DISCONTINUED | OUTPATIENT
Start: 2022-06-15 | End: 2022-06-17 | Stop reason: HOSPADM

## 2022-06-15 RX ORDER — IOPAMIDOL 755 MG/ML
100 INJECTION, SOLUTION INTRAVASCULAR ONCE
Status: COMPLETED | OUTPATIENT
Start: 2022-06-15 | End: 2022-06-15

## 2022-06-15 RX ORDER — FENTANYL CITRATE 50 UG/ML
25 INJECTION, SOLUTION INTRAMUSCULAR; INTRAVENOUS EVERY 5 MIN PRN
Status: DISCONTINUED | OUTPATIENT
Start: 2022-06-15 | End: 2022-06-15 | Stop reason: HOSPADM

## 2022-06-15 RX ORDER — AMOXICILLIN 250 MG
1 CAPSULE ORAL 2 TIMES DAILY
Status: DISCONTINUED | OUTPATIENT
Start: 2022-06-15 | End: 2022-06-17 | Stop reason: HOSPADM

## 2022-06-15 RX ORDER — ENOXAPARIN SODIUM 100 MG/ML
40 INJECTION SUBCUTANEOUS EVERY 24 HOURS
Status: DISCONTINUED | OUTPATIENT
Start: 2022-06-15 | End: 2022-06-17 | Stop reason: HOSPADM

## 2022-06-15 RX ORDER — ONDANSETRON 2 MG/ML
INJECTION INTRAMUSCULAR; INTRAVENOUS PRN
Status: DISCONTINUED | OUTPATIENT
Start: 2022-06-15 | End: 2022-06-15

## 2022-06-15 RX ORDER — ONDANSETRON 2 MG/ML
4 INJECTION INTRAMUSCULAR; INTRAVENOUS EVERY 30 MIN PRN
Status: DISCONTINUED | OUTPATIENT
Start: 2022-06-15 | End: 2022-06-15 | Stop reason: HOSPADM

## 2022-06-15 RX ORDER — BISACODYL 10 MG
10 SUPPOSITORY, RECTAL RECTAL DAILY PRN
Status: DISCONTINUED | OUTPATIENT
Start: 2022-06-15 | End: 2022-06-17 | Stop reason: HOSPADM

## 2022-06-15 RX ORDER — SODIUM CHLORIDE, SODIUM LACTATE, POTASSIUM CHLORIDE, CALCIUM CHLORIDE 600; 310; 30; 20 MG/100ML; MG/100ML; MG/100ML; MG/100ML
INJECTION, SOLUTION INTRAVENOUS CONTINUOUS PRN
Status: DISCONTINUED | OUTPATIENT
Start: 2022-06-15 | End: 2022-06-15

## 2022-06-15 RX ORDER — CLINDAMYCIN PHOSPHATE 900 MG/50ML
900 INJECTION, SOLUTION INTRAVENOUS EVERY 8 HOURS
Status: DISCONTINUED | OUTPATIENT
Start: 2022-06-15 | End: 2022-06-17

## 2022-06-15 RX ORDER — ACETAMINOPHEN 325 MG/1
975 TABLET ORAL EVERY 8 HOURS
Status: DISCONTINUED | OUTPATIENT
Start: 2022-06-15 | End: 2022-06-17 | Stop reason: HOSPADM

## 2022-06-15 RX ORDER — POLYETHYLENE GLYCOL 3350 17 G/17G
17 POWDER, FOR SOLUTION ORAL DAILY
Status: DISCONTINUED | OUTPATIENT
Start: 2022-06-16 | End: 2022-06-17 | Stop reason: HOSPADM

## 2022-06-15 RX ORDER — OXYCODONE HYDROCHLORIDE 10 MG/1
10 TABLET ORAL EVERY 4 HOURS PRN
Status: DISCONTINUED | OUTPATIENT
Start: 2022-06-15 | End: 2022-06-17

## 2022-06-15 RX ORDER — MEPERIDINE HYDROCHLORIDE 25 MG/ML
12.5 INJECTION INTRAMUSCULAR; INTRAVENOUS; SUBCUTANEOUS
Status: DISCONTINUED | OUTPATIENT
Start: 2022-06-15 | End: 2022-06-15 | Stop reason: HOSPADM

## 2022-06-15 RX ORDER — KETOROLAC TROMETHAMINE 30 MG/ML
INJECTION, SOLUTION INTRAMUSCULAR; INTRAVENOUS PRN
Status: DISCONTINUED | OUTPATIENT
Start: 2022-06-15 | End: 2022-06-15

## 2022-06-15 RX ORDER — SODIUM CHLORIDE, SODIUM LACTATE, POTASSIUM CHLORIDE, CALCIUM CHLORIDE 600; 310; 30; 20 MG/100ML; MG/100ML; MG/100ML; MG/100ML
INJECTION, SOLUTION INTRAVENOUS CONTINUOUS
Status: DISCONTINUED | OUTPATIENT
Start: 2022-06-15 | End: 2022-06-17

## 2022-06-15 RX ORDER — OXYCODONE HYDROCHLORIDE 5 MG/1
5 TABLET ORAL EVERY 4 HOURS PRN
Status: DISCONTINUED | OUTPATIENT
Start: 2022-06-15 | End: 2022-06-15 | Stop reason: HOSPADM

## 2022-06-15 RX ORDER — DEXTROSE MONOHYDRATE 25 G/50ML
25-50 INJECTION, SOLUTION INTRAVENOUS
Status: DISCONTINUED | OUTPATIENT
Start: 2022-06-15 | End: 2022-06-17 | Stop reason: HOSPADM

## 2022-06-15 RX ORDER — NICOTINE POLACRILEX 4 MG
15-30 LOZENGE BUCCAL
Status: DISCONTINUED | OUTPATIENT
Start: 2022-06-15 | End: 2022-06-17 | Stop reason: HOSPADM

## 2022-06-15 RX ORDER — ONDANSETRON 4 MG/1
4 TABLET, ORALLY DISINTEGRATING ORAL EVERY 30 MIN PRN
Status: DISCONTINUED | OUTPATIENT
Start: 2022-06-15 | End: 2022-06-15 | Stop reason: HOSPADM

## 2022-06-15 RX ORDER — SODIUM CHLORIDE, SODIUM LACTATE, POTASSIUM CHLORIDE, CALCIUM CHLORIDE 600; 310; 30; 20 MG/100ML; MG/100ML; MG/100ML; MG/100ML
INJECTION, SOLUTION INTRAVENOUS CONTINUOUS
Status: DISCONTINUED | OUTPATIENT
Start: 2022-06-15 | End: 2022-06-15 | Stop reason: HOSPADM

## 2022-06-15 RX ORDER — FENTANYL CITRATE 50 UG/ML
INJECTION, SOLUTION INTRAMUSCULAR; INTRAVENOUS PRN
Status: DISCONTINUED | OUTPATIENT
Start: 2022-06-15 | End: 2022-06-15

## 2022-06-15 RX ADMIN — PHENYLEPHRINE HYDROCHLORIDE 100 MCG: 10 INJECTION INTRAVENOUS at 05:31

## 2022-06-15 RX ADMIN — PIPERACILLIN AND TAZOBACTAM 3.38 G: 3; .375 INJECTION, POWDER, LYOPHILIZED, FOR SOLUTION INTRAVENOUS at 19:16

## 2022-06-15 RX ADMIN — ONDANSETRON 4 MG: 2 INJECTION INTRAMUSCULAR; INTRAVENOUS at 05:46

## 2022-06-15 RX ADMIN — ENOXAPARIN SODIUM 40 MG: 40 INJECTION SUBCUTANEOUS at 17:23

## 2022-06-15 RX ADMIN — PROPOFOL 200 MCG/KG/MIN: 10 INJECTION, EMULSION INTRAVENOUS at 05:09

## 2022-06-15 RX ADMIN — CLINDAMYCIN IN 5 PERCENT DEXTROSE 900 MG: 18 INJECTION, SOLUTION INTRAVENOUS at 17:40

## 2022-06-15 RX ADMIN — IOPAMIDOL 93 ML: 755 INJECTION, SOLUTION INTRAVENOUS at 00:31

## 2022-06-15 RX ADMIN — ACETAMINOPHEN 975 MG: 325 TABLET ORAL at 10:28

## 2022-06-15 RX ADMIN — MIDAZOLAM 2 MG: 1 INJECTION INTRAMUSCULAR; INTRAVENOUS at 05:06

## 2022-06-15 RX ADMIN — CLINDAMYCIN IN 5 PERCENT DEXTROSE 900 MG: 18 INJECTION, SOLUTION INTRAVENOUS at 02:11

## 2022-06-15 RX ADMIN — FENTANYL CITRATE 25 MCG: 50 INJECTION, SOLUTION INTRAMUSCULAR; INTRAVENOUS at 06:58

## 2022-06-15 RX ADMIN — VANCOMYCIN HYDROCHLORIDE 1500 MG: 10 INJECTION, POWDER, LYOPHILIZED, FOR SOLUTION INTRAVENOUS at 15:15

## 2022-06-15 RX ADMIN — FENTANYL CITRATE 50 MCG: 50 INJECTION, SOLUTION INTRAMUSCULAR; INTRAVENOUS at 05:40

## 2022-06-15 RX ADMIN — INSULIN ASPART 3 UNITS: 100 INJECTION, SOLUTION INTRAVENOUS; SUBCUTANEOUS at 17:15

## 2022-06-15 RX ADMIN — LIDOCAINE HYDROCHLORIDE 100 MG: 20 INJECTION, SOLUTION INFILTRATION; PERINEURAL at 05:09

## 2022-06-15 RX ADMIN — OXYCODONE HYDROCHLORIDE 5 MG: 5 TABLET ORAL at 17:21

## 2022-06-15 RX ADMIN — ACETAMINOPHEN 975 MG: 325 TABLET ORAL at 17:22

## 2022-06-15 RX ADMIN — OXYCODONE HYDROCHLORIDE 5 MG: 5 TABLET ORAL at 10:27

## 2022-06-15 RX ADMIN — HYDROMORPHONE HYDROCHLORIDE 0.5 MG: 1 INJECTION, SOLUTION INTRAMUSCULAR; INTRAVENOUS; SUBCUTANEOUS at 05:53

## 2022-06-15 RX ADMIN — KETOROLAC TROMETHAMINE 30 MG: 30 INJECTION, SOLUTION INTRAMUSCULAR at 06:05

## 2022-06-15 RX ADMIN — FENTANYL CITRATE 25 MCG: 50 INJECTION, SOLUTION INTRAMUSCULAR; INTRAVENOUS at 06:31

## 2022-06-15 RX ADMIN — PIPERACILLIN AND TAZOBACTAM 3.38 G: 3; .375 INJECTION, POWDER, LYOPHILIZED, FOR SOLUTION INTRAVENOUS at 01:11

## 2022-06-15 RX ADMIN — PIPERACILLIN AND TAZOBACTAM 3.38 G: 3; .375 INJECTION, POWDER, LYOPHILIZED, FOR SOLUTION INTRAVENOUS at 13:27

## 2022-06-15 RX ADMIN — INSULIN HUMAN 2 UNITS: 100 INJECTION, SOLUTION PARENTERAL at 06:02

## 2022-06-15 RX ADMIN — FENTANYL CITRATE 50 MCG: 50 INJECTION, SOLUTION INTRAMUSCULAR; INTRAVENOUS at 05:23

## 2022-06-15 RX ADMIN — PROPOFOL 150 MG: 10 INJECTION, EMULSION INTRAVENOUS at 05:14

## 2022-06-15 RX ADMIN — FENTANYL CITRATE 25 MCG: 50 INJECTION, SOLUTION INTRAMUSCULAR; INTRAVENOUS at 06:26

## 2022-06-15 RX ADMIN — OXYCODONE HYDROCHLORIDE 10 MG: 10 TABLET ORAL at 20:30

## 2022-06-15 RX ADMIN — SODIUM CHLORIDE, POTASSIUM CHLORIDE, SODIUM LACTATE AND CALCIUM CHLORIDE: 600; 310; 30; 20 INJECTION, SOLUTION INTRAVENOUS at 13:27

## 2022-06-15 RX ADMIN — SODIUM CHLORIDE 63 ML: 9 INJECTION, SOLUTION INTRAVENOUS at 00:35

## 2022-06-15 RX ADMIN — SENNOSIDES AND DOCUSATE SODIUM 1 TABLET: 8.6; 5 TABLET ORAL at 19:56

## 2022-06-15 RX ADMIN — VANCOMYCIN HYDROCHLORIDE 1500 MG: 10 INJECTION, POWDER, LYOPHILIZED, FOR SOLUTION INTRAVENOUS at 03:19

## 2022-06-15 RX ADMIN — HUMAN INSULIN 5 UNITS: 100 INJECTION, SOLUTION SUBCUTANEOUS at 07:18

## 2022-06-15 RX ADMIN — SODIUM CHLORIDE, POTASSIUM CHLORIDE, SODIUM LACTATE AND CALCIUM CHLORIDE: 600; 310; 30; 20 INJECTION, SOLUTION INTRAVENOUS at 05:06

## 2022-06-15 RX ADMIN — HYDROMORPHONE HYDROCHLORIDE 0.5 MG: 1 INJECTION, SOLUTION INTRAMUSCULAR; INTRAVENOUS; SUBCUTANEOUS at 06:11

## 2022-06-15 ASSESSMENT — ACTIVITIES OF DAILY LIVING (ADL)
CHANGE_IN_FUNCTIONAL_STATUS_SINCE_ONSET_OF_CURRENT_ILLNESS/INJURY: NO
DOING_ERRANDS_INDEPENDENTLY_DIFFICULTY: NO
WALKING_OR_CLIMBING_STAIRS_DIFFICULTY: NO
ADLS_ACUITY_SCORE: 35
DOING_ERRANDS_INDEPENDENTLY_DIFFICULTY: NO
FALL_HISTORY_WITHIN_LAST_SIX_MONTHS: NO
ADLS_ACUITY_SCORE: 22
ADLS_ACUITY_SCORE: 35
TOILETING_ISSUES: NO
DIFFICULTY_COMMUNICATING: NO
CONCENTRATING,_REMEMBERING_OR_MAKING_DECISIONS_DIFFICULTY: NO
CHANGE_IN_FUNCTIONAL_STATUS_SINCE_ONSET_OF_CURRENT_ILLNESS/INJURY: NO
DIFFICULTY_EATING/SWALLOWING: NO
DRESSING/BATHING_DIFFICULTY: NO
ADLS_ACUITY_SCORE: 24
CONCENTRATING,_REMEMBERING_OR_MAKING_DECISIONS_DIFFICULTY: NO
ADLS_ACUITY_SCORE: 24
DRESSING/BATHING_DIFFICULTY: NO
ADLS_ACUITY_SCORE: 22
DIFFICULTY_EATING/SWALLOWING: NO
WEAR_GLASSES_OR_BLIND: NO
HEARING_DIFFICULTY_OR_DEAF: NO
TOILETING_ISSUES: NO
WALKING_OR_CLIMBING_STAIRS_DIFFICULTY: NO
FALL_HISTORY_WITHIN_LAST_SIX_MONTHS: NO
ADLS_ACUITY_SCORE: 35
ADLS_ACUITY_SCORE: 35
ADLS_ACUITY_SCORE: 18

## 2022-06-15 ASSESSMENT — ENCOUNTER SYMPTOMS
FEVER: 1
CHILLS: 1
VOMITING: 0
RECTAL PAIN: 0
DIARRHEA: 0
NAUSEA: 1
DYSURIA: 0
ABDOMINAL PAIN: 0

## 2022-06-15 NOTE — PHARMACY-VANCOMYCIN DOSING SERVICE
"Pharmacy Vancomycin Initial Note  Date of Service Hue 15, 2022  Patient's  1980  42 year old, female    Indication: Abscess and Sepsis    Current estimated CrCl = Estimated Creatinine Clearance: 124.2 mL/min (based on SCr of 0.64 mg/dL).    Creatinine for last 3 days  2022: 11:06 PM Creatinine 0.64 mg/dL    Recent Vancomycin Level(s) for last 3 days  No results found for requested labs within last 72 hours.      Vancomycin IV Administrations (past 72 hours)      No vancomycin orders with administrations in past 72 hours.                Nephrotoxins and other renal medications (From now, onward)    Start     Dose/Rate Route Frequency Ordered Stop    06/15/22 0200  vancomycin 1500 mg in 0.9% NaCl 250 ml intermittent infusion 1,500 mg         1,500 mg  over 90 Minutes Intravenous EVERY 12 HOURS 06/15/22 0056      06/15/22 0045  piperacillin-tazobactam (ZOSYN) 3.375 g vial to attach to  mL bag        Note to Pharmacy: For [facility], [facility] and [facility]: For Zosyn-naive patients, use the \"Zosyn initial dose + extended infusion\" order panel.    3.375 g  over 30 Minutes Intravenous ONCE 06/15/22 0047            Contrast Orders - past 72 hours (72h ago, onward)    Start     Dose/Rate Route Frequency Stop    06/15/22 0035  iopamidol (ISOVUE-370) solution 100 mL         100 mL Intravenous ONCE 06/15/22 0031          InsightRX Prediction of Planned Initial Vancomycin Regimen  Loading dose: N/A  Regimen: 1500 mg IV every 12 hours.  Start time: 00:55 on 06/15/2022  Exposure target: AUC24 (range)400-600 mg/L.hr   AUC24,ss: 550 mg/L.hr  Probability of AUC24 > 400: 81 %  Ctrough,ss: 16.1 mg/L  Probability of Ctrough,ss > 20: 34 %  Probability of nephrotoxicity (Lodise RICCARDO ): 11 %          Plan:  1. Start vancomycin  1500 mg IV q12h.   2. Vancomycin monitoring method: AUC  3. Vancomycin therapeutic monitoring goal: 400-600 mg*h/L  4. Pharmacy will check vancomycin levels as appropriate in 1-3 Days.    5. Serum creatinine " levels will be ordered every 48 hours.      Eagle Hsu, [facility]

## 2022-06-15 NOTE — BRIEF OP NOTE
[facility]    Brief Operative Note    Pre-operative diagnosis: Perirectal abscess [K61.1]  Post-operative diagnosis Same as pre-operative diagnosis    Procedure: Procedure(s):  irrigation and debridement of right gluteus and perineum  Surgeon: Surgeon(s) and Role:     * Hany Clemens MD - Primary         Selin Sosa MD - Resident Assisting  Anesthesia: General   Estimated Blood Loss: 20 ml    Drains:  Penrose drain   Specimens:   ID Type Source Tests Collected by Time Destination   A : perineal abcess Abscess Perineum ANAEROBIC BACTERIAL CULTURE ROUTINE, GRAM STAIN, AEROBIC BACTERIAL CULTURE ROUTINE Hany Clemens MD 6/15/2022  5:51 AM      Findings:   murky fluid with small amount of grey unhealthy fat medially, no evidence of maribel necrosis.  .  Complications: None.  Implants: * No implants in log *    Selin Sosa MD  PGY-2 General Surgery

## 2022-06-15 NOTE — ANESTHESIA CARE TRANSFER NOTE
Patient: Gloria Escobar    Procedure: Procedure(s):  irrigation and debridement of right gluteus and perineum       Diagnosis: Perirectal abscess [K61.1]  Diagnosis Additional Information: No value filed.    Anesthesia Type:   General     Note:    Oropharynx: oropharynx clear of all foreign objects and spontaneously breathing  Level of Consciousness: drowsy  Oxygen Supplementation: face mask  Level of Supplemental Oxygen (L/min / FiO2): 10  Independent Airway: airway patency satisfactory and stable  Dentition: dentition unchanged  Vital Signs Stable: post-procedure vital signs reviewed and stable  Report to RN Given: handoff report given  Patient transferred to: PACU    Handoff Report: Identifed the Patient, Identified the Reponsible Provider, Reviewed the pertinent medical history, Discussed the surgical course, Reviewed Intra-OP anesthesia mangement and issues during anesthesia, Set expectations for post-procedure period and Allowed opportunity for questions and acknowledgement of understanding      Vitals:  Vitals Value Taken Time   /82 06/15/22 0615   Temp     Pulse 82 06/15/22 0617   Resp     SpO2 99 % 06/15/22 0617   Vitals shown include unvalidated device data.    Electronically Signed By: Kely Piedra CRNA, APRN CRNA  Hue 15, 2022  6:18 AM

## 2022-06-15 NOTE — OR NURSING
Pt to [facility] from the OR. Report given to RN from Elvia (ED RN).  used. Maninder wipe done. Consent signature verification done. Labs done.

## 2022-06-15 NOTE — UTILIZATION REVIEW
"  Admission Status; Secondary Review Determination     Admission Date: 6/14/2022 10:21 PM      Under the authority of the Utilization Management Committee, the utilization review process indicated a secondary review on the above patient.  The review outcome is based on review of the medical records, discussions with staff, and applying clinical experience noted on the date of the review.        (x)      Inpatient Status Appropriate - This patient's medical care is consistent with medical management for inpatient care and reasonable inpatient medical practice.      () Observation Status Appropriate - This patient does not meet hospital inpatient criteria and is placed in observation status. If this patient's primary payer is Medicare and was admitted as an inpatient, Condition Code 44 should be used and patient status changed to \"observation\".   () Admission Status NOT Appropriate - This patient's medical care is not consistent with medical management for Inpatient or Observation Status.          RATIONALE FOR DETERMINATION   Gloria Escobar is a 42 year old female with history of HTN, DM2, and recent perianal abscess s/p I&D in the ED on 6/13, here with increased perineal pain and drainage, leukocytosis 24.3, normal lactate, but CT pelvis with subcutaneous gas along the right glute extending to the pelvic floor.  There is concern for possible necrotizing fasciitis.  She is requiring IV antibiotics with Zosyn, clindamycin, and vancomycin.  She is expected to require a prolonged hospital stay.  Due to this patient's complex past medical history, soft tissue infection with concern for possible necrotizing fasciitis, need for IV Zosyn, clindamycin, and vancomycin, and expectation of a prolonged hospital stay, it is appropriate to have her admitted to the hospital as an inpatient.      The severity of illness, intensity of service provided, expected LOS and risk for adverse outcome make the care complex, " high risk and appropriate for hospital admission.        The information on this document is developed by the utilization review team in order for the business office to ensure compliance.  This only denotes the appropriateness of proper admission status and does not reflect the quality of care rendered.         The definitions of Inpatient Status and Observation Status used in making the determination above are those provided in the CMS Coverage Manual, Chapter 1 and Chapter 6, section 70.4.      Sincerely,     Ariel Pratt D.O.  Utilization Review/ Case Management  [facility].

## 2022-06-15 NOTE — ANESTHESIA PREPROCEDURE EVALUATION
Anesthesia Pre-Procedure Evaluation    Patient: Gloria Escobar   MRN: [id number] : 1980        Procedure : Procedure(s):  irrigation and debridement of right gluteus and perineum          Past Medical History:   Diagnosis Date     Hypertension      Type 2 diabetes mellitus (H) .      Past Surgical History:   Procedure Laterality Date      SECTION N/A 10/22/2020    Procedure:  SECTION;  Surgeon: Geraldine Ferro MD;  [facility]     CHOLECYSTECTOMY       cholesetomy        No Known Allergies   Social History     Tobacco Use     Smoking status: Never Smoker     Smokeless tobacco: Never Used   Substance Use Topics     Alcohol use: No      Wt Readings from Last 1 Encounters:   22 86.2 kg (190 lb)        Anesthesia Evaluation   Pt has had prior anesthetic. Type: Regional and General.    No history of anesthetic complications       ROS/MED HX  ENT/Pulmonary:  - neg pulmonary ROS     Neurologic:  - neg neurologic ROS     Cardiovascular:     (+) hypertension-----    METS/Exercise Tolerance: >4 METS    Hematologic:  - neg hematologic  ROS     Musculoskeletal:  - neg musculoskeletal ROS     GI/Hepatic:  - neg GI/hepatic ROS     Renal/Genitourinary:  - neg Renal ROS     Endo:     (+) type II DM, Not using insulin, Obesity,     Psychiatric/Substance Use:  - neg psychiatric ROS     Infectious Disease:       Malignancy:       Other:            Physical Exam    Airway        Mallampati: IV   TM distance: > 3 FB   Neck ROM: full   Mouth opening: > 3 cm    Respiratory Devices and Support         Dental  no notable dental history         Cardiovascular   cardiovascular exam normal          Pulmonary   pulmonary exam normal                OUTSIDE LABS:  CBC:   Lab Results   Component Value Date    WBC 24.3 (H) 2022    WBC 8.2 10/19/2020    HGB 11.7 2022    HGB 10.0 (L) 10/23/2020    HCT 33.6 (L) 2022    HCT 32.9 (L) 10/19/2020     2022      10/23/2020     BMP:   Lab Results   Component Value Date     06/14/2022     04/24/2018    POTASSIUM 3.2 (L) 06/14/2022    POTASSIUM 4.0 04/24/2018    CHLORIDE 99 06/14/2022    CHLORIDE 103 04/24/2018    CO2 24 06/14/2022    CO2 24 04/24/2018    BUN 14 06/14/2022    BUN 14 04/24/2018    CR 0.64 06/14/2022    CR 0.49 (L) 10/23/2020     (H) 06/14/2022    GLC 79 10/24/2020     COAGS:   Lab Results   Component Value Date    INR 1.14 06/15/2022     POC:   Lab Results   Component Value Date     (H) 10/26/2020    HCG Negative 06/14/2022     HEPATIC:   Lab Results   Component Value Date    ALT 14 10/23/2020    AST 16 10/23/2020     OTHER:   Lab Results   Component Value Date    LACT 0.9 06/14/2022    A1C 5.5 10/09/2020    SWATHI 8.9 06/14/2022    TSH 1.25 03/24/2020       Anesthesia Plan    ASA Status:  3   NPO Status:  NPO Appropriate    Anesthesia Type: General.     - Airway: ETT   Induction: Intravenous.   Maintenance: Inhalation.   Techniques and Equipment:     - Lines/Monitors: 2nd IV     Consents    Anesthesia Plan(s) and associated risks, benefits, and realistic alternatives discussed. Questions answered and patient/representative(s) expressed understanding.     - Discussed: Risks, Benefits and Alternatives for BOTH SEDATION and the PROCEDURE were discussed     - Discussed with:  Patient         Postoperative Care    Pain management: Oral pain medications, IV analgesics.   PONV prophylaxis: Ondansetron (or other 5HT-3), Background Propofol Infusion     Comments:                Leslie Goldberg, MD

## 2022-06-15 NOTE — H&P
EGS Surgery Consult  Hue 15, 2022    Gloria Escobar  : 1980    Date of Service: 6/15/2022 2:19 AM    Assessment and Plan:  Gloria Escobar is a 42 year old female with history of HTN, DM2, and recent perianal abscess s/p I&D in the ED on , here with increased perineal pain and drainage, leukocytosis 24.3, normal lactate, but CT pelvis with subcutaneous gas along the right glute extending to the pelvic floor, potentially related to prior I&D track, but cannot rule out NSTI.     - Plan for urgent I&D/debridement in the OR  - Admit to surgery postoperatively  - Send COVID stat    Discussed with chief resident and staff, Dr. Sarath Sosa MD  PGY-2 General Surgery     History of Present Illness:    Gloria Escobar is a 42 year old female with history of HTN, DM2, and recent perianal abscess s/p I&D in the ED on , sent home on keflex, who returns with increased pain and drainage from the wound. She states pain initially started last Wednesday, one week ago, acutely worsening on Friday, and has been constant since then. She initially came to the ED on  at which time she was noted to have a perianal abscess, on I&D cruciate incision and felt to be small pocket of loculated fluid, that did not require packing. She was discharge home on Keflex. Pain has continued to worsen, she now feels like the area is more swollen, and continues to have light brown/clear yellowish drainage. She has not had fevers but feels chills. Otherwise felt okay, eating well, no changes in bowel or bladder habits, no pain with defecation. Has never had anything like this in the past. Is diabetic but has not checked her sugars at home, takes metformin.     She was initially evaluated on [facility] where she was noted to be tachycardic and hypertensive, received IVF, pancultured and start on vanc, zosyn and clindamycin. CT pelvis showed right gluteal soft tissue with subcutaneous  gas tracking to the level of the pelvic floor, concerning for NSTI. She was therefore sent to [facility] for surgical consultation.     Past Medical History:  Past Medical History:   Diagnosis Date     Hypertension      Type 2 diabetes mellitus (H) .     Past Surgical History  Past Surgical History:   Procedure Laterality Date      SECTION N/A 10/22/2020    Procedure:  SECTION;  Surgeon: Geraldine Ferro MD;  Location: [facility] L[facility]     CHOLECYSTECTOMY       Family History:  Family History   Problem Relation Age of Onset     Diabetes Mother         type 2      Thyroid Cancer Mother      Diabetes Father         type 2     Social History:  Social History     Socioeconomic History     Marital status:      Spouse name: Not on file     Number of children: Not on file     Years of education: Not on file     Highest education level: Not on file   Occupational History     Not on file   Tobacco Use     Smoking status: Never Smoker     Smokeless tobacco: Never Used   Substance and Sexual Activity     Alcohol use: No     Drug use: No     Sexual activity: Not Currently     Partners: Male     Birth control/protection: None   Other Topics Concern     Not on file   Social History Narrative     Not on file     Social Determinants of Health     Financial Resource Strain: Not on file   Food Insecurity: Not on file   Transportation Needs: Not on file   Physical Activity: Not on file   Stress: Not on file   Social Connections: Not on file   Intimate Partner Violence: Not on file   Housing Stability: Not on file       Medications:  Current Outpatient Medications   Medication Sig Dispense Refill     acetaminophen (TYLENOL) 325 MG tablet Take 2 tablets (650 mg) by mouth every 6 hours as needed for mild pain Start after Delivery. 100 tablet 0     cephALEXin (KEFLEX) 500 MG capsule Take 1 capsule (500 mg) by mouth 4 times daily for 7 days 28 capsule 0     ibuprofen (ADVIL/MOTRIN) 600 MG tablet Take 1 tablet (600 mg)  by mouth every 6 hours as needed for moderate pain Start after delivery 60 tablet 0     metFORMIN (GLUCOPHAGE-XR) 500 MG 24 hr tablet Take 1 tablet (500 mg) by mouth 2 times daily (with meals) 60 tablet 3     sulfamethoxazole-trimethoprim (BACTRIM DS) 800-160 MG tablet Take 1 tablet by mouth 2 times daily for 7 days 14 tablet 0     blood glucose (ACCU-CHEK GUIDE) test strip Use to test blood sugar 4 times daily (before breakfast and 1 hours after start of each meal). 150 strip 6     blood glucose (NO BRAND SPECIFIED) test strip Use to test blood sugar 4 times daily or as directed. 100 each 4     blood glucose monitoring (ACCU-CHEK FASTCLIX) lancets Use to check blood glucose 4 times daily 204 each 6     Blood Glucose Monitoring Suppl (ACCU-CHEK GUIDE ME) w/Device KIT 1 Device daily 1 kit 0     insulin pen needle (31G X 5 MM) 31G X 5 MM miscellaneous Use 4 pen needles daily or as directed. 200 each 3     NIFEdipine ER (ADALAT CC) 30 MG 24 hr tablet Take 1 tablet (30 mg) by mouth daily 60 tablet 1     Prenatal Vit-Fe Fumarate-FA (PRENATAL VITAMIN) 27-0.8 MG TABS Take 1 tablet by mouth daily 100 tablet 3       Allergies:   No Known Allergies    Review of Symptoms:  A 10 point review of symptoms has been conducted and is negative except for that mentioned in the above HPI.    Physical Exam:  Blood pressure 112/63, pulse 101, temperature 98.3  F (36.8  C), temperature source Oral, resp. rate 16, SpO2 100 %, currently breastfeeding.  Gen:    Lying on gurney and left side, appears tearful but stoic in NAD  HEENT: Normocephalic and atraumatic  CV:  RRR (pulse)  Pulm:  Non-labored breathing on room air  Abd:  Soft, non-distended  Perineum:  Right glute with blanching erythema and swelling, diffusely tender to palpation, worse anteriorly in the perianal region with overlying induration, light tan drainage noted in gluteal cleft, unable to appreciate any crepitus, anal skin tag  Ext:  Warm and well perfused, no LE  edema    Labs:  CBC RESULTS:   Recent Labs   Lab Test 06/14/22  2306   WBC 24.3*   RBC 3.73*   HGB 11.7   HCT 33.6*   MCV 90   MCH 31.4   MCHC 34.8   RDW 11.9        Last Basic Metabolic Panel:  Lab Results   Component Value Date     06/14/2022     04/24/2018      Lab Results   Component Value Date    POTASSIUM 3.2 06/14/2022    POTASSIUM 4.0 04/24/2018     Lab Results   Component Value Date    CHLORIDE 99 06/14/2022    CHLORIDE 103 04/24/2018     Lab Results   Component Value Date    SWATHI 8.9 06/14/2022    SWATHI 8.7 04/24/2018     Lab Results   Component Value Date    CO2 24 06/14/2022    CO2 24 04/24/2018     Lab Results   Component Value Date    BUN 14 06/14/2022    BUN 14 04/24/2018     Lab Results   Component Value Date    CR 0.64 06/14/2022    CR 0.49 10/23/2020     Lab Results   Component Value Date     06/14/2022    GLC 79 10/24/2020       Imaging:  EXAM: CT PELVIS SOFT TISSUE W CONTRAST  LOCATION: [facility]  DATE/TIME: 6/15/2022 12:20 AM     INDICATION: Abscess, anal or rectal  COMPARISON: None.  TECHNIQUE: CT scan of the pelvis was performed with IV contrast. Multiplanar reformats were obtained. Dose reduction techniques were used.  CONTRAST: 93mL isovue 370     FINDINGS:     PELVIC ORGANS: Normal appendix. There is some edema in the inferior aspect of the right hemipelvis. A few borderline enlarged right inguinal lymph nodes.     MUSCULOSKELETAL: Irregular locules of soft tissue gas with adjacent edema throughout the medial aspect of the right gluteal subcutaneous. Gas tracks cranially along the right levator ani muscle which appears asymmetrically thickened relative to the left.   A locule of gas on image 238 of the axial series, seen also on sagittal image 71 is likely external to or less likely within the levator ani muscle belly. There is no definite soft tissue gas within the pelvis. Normal osseous structures.                                                                       IMPRESSION:  1.  Subcutaneous gas throughout the medial aspect of the right gluteal soft tissues coursing superiorly to the level of the right pelvic floor musculature which appears thickened and edematous. A single locule of gas is intimately adjacent to the levator ani muscle belly, potentially though less likely within the muscle itself. There is no evidence of gas within the pelvis, though the findings are overall highly concerning for gas-forming soft tissue infection.

## 2022-06-15 NOTE — ANESTHESIA POSTPROCEDURE EVALUATION
Patient: Gloria Escobar    Procedure: Procedure(s):  irrigation and debridement of right gluteus and perineum       Anesthesia Type:  General    Note:  Disposition: Admission   Postop Pain Control: Uneventful            Sign Out: Well controlled pain   PONV: No   Neuro/Psych: Uneventful            Sign Out: Acceptable/Baseline neuro status   Airway/Respiratory: Uneventful            Sign Out: Acceptable/Baseline resp. status   CV/Hemodynamics: Uneventful            Sign Out: Acceptable CV status; No obvious hypovolemia; No obvious fluid overload   Other NRE: NONE   DID A NON-ROUTINE EVENT OCCUR? No           Last vitals:  Vitals Value Taken Time   /87 06/15/22 0655   Temp 36.8  C (98.2  F) 06/15/22 0655   Pulse 71 06/15/22 0659   Resp 14 06/15/22 0655   SpO2 99 % 06/15/22 0659   Vitals shown include unvalidated device data.    Electronically Signed By: Christen Montero MD  Hue 15, 2022  7:00 AM

## 2022-06-15 NOTE — PLAN OF CARE
/58 (BP Location: Right arm)   Pulse 58   Temp (!) 96  F (35.6  C) (Oral)   Resp 16   LMP 06/15/2022 (Exact Date)   SpO2 98%        NEURO:  Alert and oriented X4. Able to make her needs know. Calm and pleasant   Respiratory: O2 >98% in room air. NO sob.   Cardiac: Denies chest pain.   GI/: Voids adequately. BS active x4. NO LBM  6/14 per pt.   Skin/Drains/Incisions: Incision on buttock clean and draining small amount   Lines: PIV right and left ABx   Pain: Oxycodone and tylenol given   Diet: full liquid   Labs: reviewed BS monitoring done per order.   Activity Level. walks in the room with assistance.     Goal Outcome Evaluation:    Plan of Care Reviewed With: patient     Overall Patient Progress: no change

## 2022-06-15 NOTE — ANESTHESIA PROCEDURE NOTES
Airway       Patient location during procedure: OR  Staff -        Anesthesiologist:  Griselda Pascual MD       Resident/Fellow: Goldberg, Leslie, MD       Performed By: resident  Consent for Airway        Urgency: elective  Indications and Patient Condition       Indications for airway management: steph-procedural       Induction type:intravenous       Mask difficulty assessment: 1 - vent by mask    Final Airway Details       Final airway type: supraglottic airway    Supraglottic Airway Details        Type: LMA       Brand: LMA Unique       LMA size: 3    Post intubation assessment        Placement verified by: capnometry and chest rise        Number of attempts at approach: 1       Number of other approaches attempted: 0       Secured with: pink tape       Ease of procedure: easy       Dentition: Intact and Unchanged

## 2022-06-15 NOTE — PROGRESS NOTES
Admitted/transferred from:   2 RN  skin assessment completed by Cinthia Cross RN and Emma CALERO  Skin assessment finding: Buttocks packing, moist, small drainage serosanguineous   Interventions/actions: Education provided to maintain skin integrity.       Will continue to monitor.

## 2022-06-15 NOTE — ED PROVIDER NOTES
[facility] [place] ([facility])  22    History     Chief Complaint   Patient presents with     Rectal/perineal Pain     Pt had incision and drainage yesterday, today pt experiencing severe pain and more drainage coming out.      The history is provided by the patient and medical records. The history is limited by a language barrier (Vietnamese). A  was used (Official iPad).      Gloria Escobar is a 42 year old female with PMH significant for HTN, DM2, and known perianal abscess who presents to the ED for evaluation of right buttock pain, swelling, and discharge.  Patient visited the ED yesterday where she had a perianal abscess that was incised and drained at bedside.  Patient reports that she did have fever and chills today.  Patient states that she is having constant right buttock pain and increased swelling as well as new dark brown/yellow discharge from the incision wound.  She also endorses nausea and decreased appetite.  She denies vomiting, diarrhea, or abdominal pain.  She denies previous abscess.  Patient denies the swelling spreading to her groin/vaginal area.  She denies pain inside of the rectum with BM, reporting that her last BM was earlier today at approximately 3 PM.  Patient states that she has been taking Tylenol for the pain without improvement.  She reports that she has been taking her antibiotics as prescribed (Keflex and Bactrim).  Patient is a diabetic, not on insulin and states that she has not checked her blood sugars recently.    Past Medical History  Past Medical History:   Diagnosis Date     Hypertension      Type 2 diabetes mellitus (H) .     Past Surgical History:   Procedure Laterality Date      SECTION N/A 10/22/2020    Procedure:  SECTION;  Surgeon: Geraldine Ferro MD;  Location: [facility] L+D     CHOLECYSTECTOMY       cholesetomy       acetaminophen (TYLENOL) 325 MG tablet  cephALEXin (KEFLEX) 500 MG  capsule  ibuprofen (ADVIL/MOTRIN) 600 MG tablet  metFORMIN (GLUCOPHAGE-XR) 500 MG 24 hr tablet  sulfamethoxazole-trimethoprim (BACTRIM DS) 800-160 MG tablet  blood glucose (ACCU-CHEK GUIDE) test strip  blood glucose (NO BRAND SPECIFIED) test strip  blood glucose monitoring (ACCU-CHEK FASTCLIX) lancets  Blood Glucose Monitoring Suppl (ACCU-CHEK GUIDE ME) w/Device KIT  insulin pen needle (31G X 5 MM) 31G X 5 MM miscellaneous  NIFEdipine ER (ADALAT CC) 30 MG 24 hr tablet  Prenatal Vit-Fe Fumarate-FA (PRENATAL VITAMIN) 27-0.8 MG TABS      No Known Allergies  Family History  Family History   Problem Relation Age of Onset     Diabetes Mother         type 2      Thyroid Cancer Mother      Diabetes Father         type 2     Social History   Social History     Tobacco Use     Smoking status: Never Smoker     Smokeless tobacco: Never Used   Substance Use Topics     Alcohol use: No     Drug use: No      Past medical history, past surgical history, medications, allergies, family history, and social history were reviewed with the patient. No additional pertinent items.       Review of Systems   Constitutional: Positive for chills and fever.   Gastrointestinal: Positive for nausea. Negative for abdominal pain, diarrhea, rectal pain and vomiting.   Genitourinary: Negative for dysuria, pelvic pain and vaginal pain.   Skin: Positive for wound (R sided buttocks with swelling, pain, discharge).   All other systems reviewed and are negative.    A complete review of systems was performed with pertinent positives and negatives noted in the HPI, and all other systems negative.    Physical Exam   BP: 120/77  Pulse: 101  Temp: 98.4  F (36.9  C)  Resp: 18  SpO2: 98 %  Physical Exam  Vitals and nursing note reviewed.   Constitutional:       General: She is not in acute distress.     Appearance: Normal appearance. She is well-developed. She is not ill-appearing, toxic-appearing or diaphoretic.   HENT:      Head: Normocephalic and atraumatic.       Nose: Nose normal.   Eyes:      General: No scleral icterus.     Conjunctiva/sclera: Conjunctivae normal.   Cardiovascular:      Rate and Rhythm: Normal rate.   Pulmonary:      Effort: Pulmonary effort is normal. No respiratory distress.      Breath sounds: No stridor.   Abdominal:      General: There is no distension.      Palpations: Abdomen is soft.      Tenderness: There is no abdominal tenderness. There is no guarding or rebound.   Genitourinary:     Rectum: External hemorrhoid present.          Comments: Purulent drainage from small incision in right gluteus near perineum. Non-thrombosed hemorrhoid.  Musculoskeletal:         General: No deformity or signs of injury. Normal range of motion.      Cervical back: Normal range of motion and neck supple. No rigidity.        Legs:       Comments: Large area of redness, warmth, induration, tenderness over right gluteus extending from wound in perineum with purulent drainage   Skin:     General: Skin is warm and dry.      Coloration: Skin is not jaundiced or pale.      Findings: Erythema present. No rash.   Neurological:      General: No focal deficit present.      Mental Status: She is alert and oriented to person, place, and time.   Psychiatric:         Mood and Affect: Mood normal.         Behavior: Behavior normal.         Thought Content: Thought content normal.         ED Course      Procedures   10:54 PM  The patient was seen and examined by Rachel Bradshaw MD in Room ED03.        Critical Care Addendum    My initial assessment, based on my review of nursing observations, review of vital signs, focused history and physical exam, established that Gloria Escobar has suspicion for sepsis and need for evaluation and early goal-directed therapy, which requires immediate intervention, and therefore she is critically ill.     After the initial assessment, the care team initiated multiple lab tests, initiated IV fluid administration, initiated  medication therapy with broad spectrum abx and consulted with surgery to provide stabilization care. Due to the critical nature of this patient, I reassessed nursing observations, vital signs, physical exam and mental status multiple times prior to her disposition.     Time also spent performing documentation, reviewing test results, discussion with consultants, coordination of care and and arranging transport to [facility].     Critical care time (excluding teaching time and procedures): 43 minutes.               Results for orders placed or performed during the hospital encounter of 06/14/22   CT Pelvis Soft Tissue w Contrast     Status: Abnormal   Result Value Ref Range    Radiologist flags (AA)      Findings concerning for gas-forming soft tissue infection.    Narrative    EXAM: CT PELVIS SOFT TISSUE W CONTRAST  LOCATION: [facility]  DATE/TIME: 6/15/2022 12:20 AM    INDICATION: Abscess, anal or rectal  COMPARISON: None.  TECHNIQUE: CT scan of the pelvis was performed with IV contrast. Multiplanar reformats were obtained. Dose reduction techniques were used.  CONTRAST: 93mL isovue 370    FINDINGS:    PELVIC ORGANS: Normal appendix. There is some edema in the inferior aspect of the right hemipelvis. A few borderline enlarged right inguinal lymph nodes.    MUSCULOSKELETAL: Irregular locules of soft tissue gas with adjacent edema throughout the medial aspect of the right gluteal subcutaneous. Gas tracks cranially along the right levator ani muscle which appears asymmetrically thickened relative to the left.   A locule of gas on image 238 of the axial series, seen also on sagittal image 71 is likely external to or less likely within the levator ani muscle belly. There is no definite soft tissue gas within the pelvis. Normal osseous structures.      Impression    IMPRESSION:  1.  Subcutaneous gas throughout the medial aspect of the right gluteal soft tissues coursing  superiorly to the level of the right pelvic floor musculature which appears thickened and edematous. A single locule of gas is intimately adjacent to the levator   ani muscle belly, potentially though less likely within the muscle itself. There is no evidence of gas within the pelvis, though the findings are overall highly concerning for gas-forming soft tissue infection.      [Critical Result: Findings concerning for gas-forming soft tissue infection.]    Finding was identified on 6/15/2022 12:53 AM.     1.  Dr. Bradshaw was contacted by me on 6/15/2022 1:03 AM and verbalized understanding of the critical result.      Basic metabolic panel     Status: Abnormal   Result Value Ref Range    Sodium 133 133 - 144 mmol/L    Potassium 3.2 (L) 3.4 - 5.3 mmol/L    Chloride 99 94 - 109 mmol/L    Carbon Dioxide (CO2) 24 20 - 32 mmol/L    Anion Gap 10 3 - 14 mmol/L    Urea Nitrogen 14 7 - 30 mg/dL    Creatinine 0.64 0.52 - 1.04 mg/dL    Calcium 8.9 8.5 - 10.1 mg/dL    Glucose 229 (H) 70 - 99 mg/dL    GFR Estimate >90 >60 mL/min/1.73m2   CBC with platelets and differential     Status: Abnormal   Result Value Ref Range    WBC Count 24.3 (H) 4.0 - 11.0 10e3/uL    RBC Count 3.73 (L) 3.80 - 5.20 10e6/uL    Hemoglobin 11.7 11.7 - 15.7 g/dL    Hematocrit 33.6 (L) 35.0 - 47.0 %    MCV 90 78 - 100 fL    MCH 31.4 26.5 - 33.0 pg    MCHC 34.8 31.5 - 36.5 g/dL    RDW 11.9 10.0 - 15.0 %    Platelet Count 287 150 - 450 10e3/uL    % Neutrophils 79 %    % Lymphocytes 12 %    % Monocytes 5 %    % Eosinophils 1 %    % Basophils 1 %    % Immature Granulocytes 2 %    NRBCs per 100 WBC 0 <1 /100    Absolute Neutrophils 19.4 (H) 1.6 - 8.3 10e3/uL    Absolute Lymphocytes 2.8 0.8 - 5.3 10e3/uL    Absolute Monocytes 1.3 0.0 - 1.3 10e3/uL    Absolute Eosinophils 0.1 0.0 - 0.7 10e3/uL    Absolute Basophils 0.1 0.0 - 0.2 10e3/uL    Absolute Immature Granulocytes 0.5 (H) <=0.4 10e3/uL    Absolute NRBCs 0.0 10e3/uL   Lactic acid whole blood STAT     Status:  Normal   Result Value Ref Range    Lactic Acid 0.9 0.7 - 2.0 mmol/L   CK total     Status: Normal   Result Value Ref Range     30 - 225 U/L   INR     Status: Normal   Result Value Ref Range    INR 1.14 0.85 - 1.15   hCG qual urine POCT     Status: Normal   Result Value Ref Range    HCG Qual Urine Negative Negative    Internal QC Check POCT Valid Valid    POCT Kit Lot Number 0     POCT Kit Expiration Date 0    CBC with platelets differential     Status: Abnormal    Narrative    The following orders were created for panel order CBC with platelets differential.  Procedure                               Abnormality         Status                     ---------                               -----------         ------                     CBC with platelets and d...[[id number]]  Abnormal            Final result                 Please view results for these tests on the individual orders.     Medications   ondansetron (ZOFRAN) injection 4 mg (has no administration in time range)   clindamycin (CLEOCIN) infusion 900 mg (has no administration in time range)   vancomycin 1500 mg in 0.9% NaCl 250 ml intermittent infusion 1,500 mg (has no administration in time range)   HYDROmorphone (PF) (DILAUDID) injection 0.5 mg (0.5 mg Intravenous Given 6/14/22 2304)   0.9% sodium chloride BOLUS (0 mLs Intravenous Stopped 6/15/22 0108)   iopamidol (ISOVUE-370) solution 100 mL (93 mLs Intravenous Given 6/15/22 0031)   sodium chloride 0.9 % bag 100mL (63 mLs Intravenous Given 6/15/22 0035)   piperacillin-tazobactam (ZOSYN) 3.375 g vial to attach to  mL bag (3.375 g Intravenous New Bag 6/15/22 0111)        Assessments & Plan (with Medical Decision Making)   42 year old female with PMH significant for HTN, DM2, and known perianal abscess who presents to the ED for evaluation of right buttock pain, swelling, and discharge.     Ddx: perirectal abscess/fistula, gluteal abscess/cellulitis, bacteremia    Patient with large area of soft  tissue induration and erythema involving right buttock since recent I&D of a perineal abscess. Diabetic. Subjective fevers. Nontoxic appearing and afebrile. Vitals nl. Given IVF, zofran, dilaudid.    CT pelvis with IV contrast shows extensive subcutaneous gas in the right gluteal soft tissue extending to the pelvic floor musculature with foci of gas potentially within the levator ani muscle belly. WBC 24.3. Creat nl. Lactate nl. CK nl. Blood cultures sent. Glucose 229. Added on type and screen and INR for OR. Consulted gen surg who will see patient in [place] ED. Started clindamycin, vanc, zosyn. Gave hand off to [place] ED for transfer. Updated patient on results of w/u and likely emergent OR. Transported to [place] for further care.        I have reviewed the nursing notes. I have reviewed the findings, diagnosis, plan and need for follow up with the patient.    New Prescriptions    No medications on file       Final diagnoses:   Necrotizing soft tissue infection     IBrennan, am serving as a trained medical scribe to document services personally performed by Rachel Bradshaw MD, based on the provider's statements to me.     IRachel MD, was physically present and have reviewed and verified the accuracy of this note documented by Brennan Velasquez.    --  Rachel Bradshaw MD  [facility] EMERGENCY DEPARTMENT  6/14/2022     Rachel Bradshaw MD  06/15/22 0158

## 2022-06-16 LAB
ANION GAP SERPL CALCULATED.3IONS-SCNC: 10 MMOL/L (ref 3–14)
BUN SERPL-MCNC: 7 MG/DL (ref 7–30)
CALCIUM SERPL-MCNC: 8 MG/DL (ref 8.5–10.1)
CHLORIDE BLD-SCNC: 106 MMOL/L (ref 94–109)
CO2 SERPL-SCNC: 22 MMOL/L (ref 20–32)
CREAT SERPL-MCNC: 0.56 MG/DL (ref 0.52–1.04)
ERYTHROCYTE [DISTWIDTH] IN BLOOD BY AUTOMATED COUNT: 12.2 % (ref 10–15)
GFR SERPL CREATININE-BSD FRML MDRD: >90 ML/MIN/1.73M2
GLUCOSE BLD-MCNC: 195 MG/DL (ref 70–99)
GLUCOSE BLDC GLUCOMTR-MCNC: 185 MG/DL (ref 70–99)
GLUCOSE BLDC GLUCOMTR-MCNC: 198 MG/DL (ref 70–99)
GLUCOSE BLDC GLUCOMTR-MCNC: 218 MG/DL (ref 70–99)
GLUCOSE BLDC GLUCOMTR-MCNC: 330 MG/DL (ref 70–99)
HCT VFR BLD AUTO: 29.8 % (ref 35–47)
HGB BLD-MCNC: 10.2 G/DL (ref 11.7–15.7)
MAGNESIUM SERPL-MCNC: 2.1 MG/DL (ref 1.6–2.3)
MCH RBC QN AUTO: 30.9 PG (ref 26.5–33)
MCHC RBC AUTO-ENTMCNC: 34.2 G/DL (ref 31.5–36.5)
MCV RBC AUTO: 90 FL (ref 78–100)
PHOSPHATE SERPL-MCNC: 4 MG/DL (ref 2.5–4.5)
PLATELET # BLD AUTO: 272 10E3/UL (ref 150–450)
POTASSIUM BLD-SCNC: 3.4 MMOL/L (ref 3.4–5.3)
RBC # BLD AUTO: 3.3 10E6/UL (ref 3.8–5.2)
SODIUM SERPL-SCNC: 138 MMOL/L (ref 133–144)
VANCOMYCIN SERPL-MCNC: 12.4 MG/L
WBC # BLD AUTO: 12.7 10E3/UL (ref 4–11)

## 2022-06-16 PROCEDURE — 250N000011 HC RX IP 250 OP 636: Performed by: STUDENT IN AN ORGANIZED HEALTH CARE EDUCATION/TRAINING PROGRAM

## 2022-06-16 PROCEDURE — 85027 COMPLETE CBC AUTOMATED: CPT | Performed by: STUDENT IN AN ORGANIZED HEALTH CARE EDUCATION/TRAINING PROGRAM

## 2022-06-16 PROCEDURE — 120N000002 HC R&B MED SURG/OB UMMC

## 2022-06-16 PROCEDURE — 36415 COLL VENOUS BLD VENIPUNCTURE: CPT | Performed by: SURGERY

## 2022-06-16 PROCEDURE — 258N000003 HC RX IP 258 OP 636: Performed by: STUDENT IN AN ORGANIZED HEALTH CARE EDUCATION/TRAINING PROGRAM

## 2022-06-16 PROCEDURE — 999N000127 HC STATISTIC PERIPHERAL IV START W US GUIDANCE

## 2022-06-16 PROCEDURE — 36415 COLL VENOUS BLD VENIPUNCTURE: CPT | Performed by: STUDENT IN AN ORGANIZED HEALTH CARE EDUCATION/TRAINING PROGRAM

## 2022-06-16 PROCEDURE — 83735 ASSAY OF MAGNESIUM: CPT | Performed by: STUDENT IN AN ORGANIZED HEALTH CARE EDUCATION/TRAINING PROGRAM

## 2022-06-16 PROCEDURE — 80048 BASIC METABOLIC PNL TOTAL CA: CPT | Performed by: STUDENT IN AN ORGANIZED HEALTH CARE EDUCATION/TRAINING PROGRAM

## 2022-06-16 PROCEDURE — 80202 ASSAY OF VANCOMYCIN: CPT | Performed by: SURGERY

## 2022-06-16 PROCEDURE — 250N000013 HC RX MED GY IP 250 OP 250 PS 637: Performed by: STUDENT IN AN ORGANIZED HEALTH CARE EDUCATION/TRAINING PROGRAM

## 2022-06-16 PROCEDURE — 250N000013 HC RX MED GY IP 250 OP 250 PS 637: Performed by: SURGERY

## 2022-06-16 PROCEDURE — 84100 ASSAY OF PHOSPHORUS: CPT | Performed by: STUDENT IN AN ORGANIZED HEALTH CARE EDUCATION/TRAINING PROGRAM

## 2022-06-16 RX ORDER — MULTIPLE VITAMINS W/ MINERALS TAB 9MG-400MCG
1 TAB ORAL DAILY
Status: DISCONTINUED | OUTPATIENT
Start: 2022-06-16 | End: 2022-06-17 | Stop reason: HOSPADM

## 2022-06-16 RX ORDER — HYDROMORPHONE HYDROCHLORIDE 1 MG/ML
0.5 INJECTION, SOLUTION INTRAMUSCULAR; INTRAVENOUS; SUBCUTANEOUS
Status: DISCONTINUED | OUTPATIENT
Start: 2022-06-16 | End: 2022-06-17

## 2022-06-16 RX ADMIN — HYDROMORPHONE HYDROCHLORIDE 0.2 MG: 1 INJECTION, SOLUTION INTRAMUSCULAR; INTRAVENOUS; SUBCUTANEOUS at 20:19

## 2022-06-16 RX ADMIN — POLYETHYLENE GLYCOL 3350 17 G: 17 POWDER, FOR SOLUTION ORAL at 07:52

## 2022-06-16 RX ADMIN — OXYCODONE HYDROCHLORIDE 10 MG: 10 TABLET ORAL at 05:00

## 2022-06-16 RX ADMIN — INSULIN ASPART 4 UNITS: 100 INJECTION, SOLUTION INTRAVENOUS; SUBCUTANEOUS at 13:29

## 2022-06-16 RX ADMIN — PIPERACILLIN AND TAZOBACTAM 3.38 G: 3; .375 INJECTION, POWDER, LYOPHILIZED, FOR SOLUTION INTRAVENOUS at 13:11

## 2022-06-16 RX ADMIN — CLINDAMYCIN IN 5 PERCENT DEXTROSE 900 MG: 18 INJECTION, SOLUTION INTRAVENOUS at 09:18

## 2022-06-16 RX ADMIN — VANCOMYCIN HYDROCHLORIDE 1500 MG: 10 INJECTION, POWDER, LYOPHILIZED, FOR SOLUTION INTRAVENOUS at 14:25

## 2022-06-16 RX ADMIN — CLINDAMYCIN IN 5 PERCENT DEXTROSE 900 MG: 18 INJECTION, SOLUTION INTRAVENOUS at 17:06

## 2022-06-16 RX ADMIN — ACETAMINOPHEN 975 MG: 325 TABLET ORAL at 01:16

## 2022-06-16 RX ADMIN — OXYCODONE HYDROCHLORIDE 10 MG: 10 TABLET ORAL at 17:31

## 2022-06-16 RX ADMIN — PIPERACILLIN AND TAZOBACTAM 3.38 G: 3; .375 INJECTION, POWDER, LYOPHILIZED, FOR SOLUTION INTRAVENOUS at 07:49

## 2022-06-16 RX ADMIN — CLINDAMYCIN IN 5 PERCENT DEXTROSE 900 MG: 18 INJECTION, SOLUTION INTRAVENOUS at 00:40

## 2022-06-16 RX ADMIN — OXYCODONE HYDROCHLORIDE 10 MG: 10 TABLET ORAL at 22:26

## 2022-06-16 RX ADMIN — VANCOMYCIN HYDROCHLORIDE 1500 MG: 10 INJECTION, POWDER, LYOPHILIZED, FOR SOLUTION INTRAVENOUS at 01:18

## 2022-06-16 RX ADMIN — ENOXAPARIN SODIUM 40 MG: 40 INJECTION SUBCUTANEOUS at 17:06

## 2022-06-16 RX ADMIN — ACETAMINOPHEN 975 MG: 325 TABLET ORAL at 10:02

## 2022-06-16 RX ADMIN — PIPERACILLIN AND TAZOBACTAM 3.38 G: 3; .375 INJECTION, POWDER, LYOPHILIZED, FOR SOLUTION INTRAVENOUS at 01:12

## 2022-06-16 RX ADMIN — SENNOSIDES AND DOCUSATE SODIUM 1 TABLET: 8.6; 5 TABLET ORAL at 07:52

## 2022-06-16 RX ADMIN — SODIUM CHLORIDE, POTASSIUM CHLORIDE, SODIUM LACTATE AND CALCIUM CHLORIDE: 600; 310; 30; 20 INJECTION, SOLUTION INTRAVENOUS at 10:02

## 2022-06-16 RX ADMIN — ONDANSETRON 4 MG: 4 TABLET, ORALLY DISINTEGRATING ORAL at 17:12

## 2022-06-16 RX ADMIN — INSULIN ASPART 2 UNITS: 100 INJECTION, SOLUTION INTRAVENOUS; SUBCUTANEOUS at 17:32

## 2022-06-16 RX ADMIN — SENNOSIDES AND DOCUSATE SODIUM 1 TABLET: 8.6; 5 TABLET ORAL at 20:13

## 2022-06-16 RX ADMIN — ACETAMINOPHEN 975 MG: 325 TABLET ORAL at 17:31

## 2022-06-16 RX ADMIN — HYDROMORPHONE HYDROCHLORIDE 0.4 MG: 1 INJECTION, SOLUTION INTRAMUSCULAR; INTRAVENOUS; SUBCUTANEOUS at 08:19

## 2022-06-16 RX ADMIN — PIPERACILLIN AND TAZOBACTAM 3.38 G: 3; .375 INJECTION, POWDER, LYOPHILIZED, FOR SOLUTION INTRAVENOUS at 20:13

## 2022-06-16 RX ADMIN — INSULIN ASPART 2 UNITS: 100 INJECTION, SOLUTION INTRAVENOUS; SUBCUTANEOUS at 07:52

## 2022-06-16 RX ADMIN — Medication 1 TABLET: at 13:29

## 2022-06-16 ASSESSMENT — ACTIVITIES OF DAILY LIVING (ADL)
ADLS_ACUITY_SCORE: 24

## 2022-06-16 NOTE — PROGRESS NOTES
SPIRITUAL HEALTH SERVICES Progress Note  [facility] ([place] 7B    Checked-in with patient briefly while she was attempting to get some sleep around 12:40pm. Made follow-up visit later in day at 2:40pm. Gloria was still asleep and did not respond to her name being called.     Pt. Can still request  support for any immediate needs. I plan to make a follow-up visit tomorrow.     Kerwin Lewis MA   Intern  Pager [phone]      * [facility] remains available 24/7 for emergent requests/referrals, either by having the switchboard page the on-call  or by entering an ASAP/STAT consult in Epic (this will also page the on-call ). Routine Epic consults receive an initial response within 24 hours.*

## 2022-06-16 NOTE — PLAN OF CARE
Blood pressure 128/86, pulse 61, temperature 97.5  F (36.4  C), temperature source Oral, resp. rate 16, last menstrual period 06/15/2022, SpO2 98 %, not currently breastfeeding.    ORIENTATION: Alert and oriented by 4, able to make needs known    PAIN: Mild complaints of pain PRN oxy 10 mg given x 1 along with scheduled tylenol.     SKIN: Intact ex; known wound from I & D of necrotic tissue. ABD changed once during time with writer [id number].     LINES/DRAINS: Pt has L PIV currently infusing. No other drains.    TRANSFERS: Ind in room     CARDIAC: WDL    RESPIRATORY: WDL          SUMMARY OF SHIFT: Pt required insulin this evening-- two units. Complained of nausea, zofran given x 1. Tolerating chicken broth-- remains on clear liquids, pt did not want to try new diet. Continue with plan of care.

## 2022-06-16 NOTE — PROGRESS NOTES
Status:  Perianal Abscess   Neuro: A&O x4   GI: BS +NO BM passing flatus.  : Voiding in the BR with SBA  Resp: Lung sounds present, 2 NC 96%   Mobility: SBA with bathroom.  Cardiac: Denies chest pain  Skin: R buttock incision packed with moist dressing intact. Dressing changed X 2. Lap sites intact  Lines/Drains:PIV X2  Pain: Oxycodone managed the pain .  Behavior: Calm and co-operative.  VS:Blood pressure 121/7, pulse 58, temperature 97.5 F (36.4  C), temperature source Oral, resp. rate 16.      and 330, covered with insulin, and tolerating clear liquid diet.

## 2022-06-16 NOTE — PROGRESS NOTES
Surgery Progress Note  06/16/2022       Subjective:  No acute events overnight. Afebrile, stable vitals. This morning, patient feels well and reports mild pain in the right buttock.     Objective:  Temp:  [96  F (35.6  C)-98.6  F (37  C)] 97.5  F (36.4  C)  Pulse:  [56-68] 58  Resp:  [15-19] 16  BP: ()/(52-77) 121/77  SpO2:  [94 %-99 %] 96 %    No intake/output data recorded.      Gen: awake, alert, no acute distress, non-toxic appearing  Resp: breathing comfortably on room air  Abd: soft, nondistended, nontender  Incision: clean, dry, intact. Packing was removed at bedside, foul smelling, not replaced.  Ext: warm and well-perfused, no edema     Labs:  Recent Labs   Lab 06/14/22  2306   WBC 24.3*   HGB 11.7        Recent Labs   Lab 06/16/22  0740 06/15/22  2213 06/15/22  1654 06/15/22  1355 06/15/22  0419 06/14/22  2306   NA  --   --   --   --   --  133   POTASSIUM  --   --   --   --   --  3.2*   CHLORIDE  --   --   --   --   --  99   CO2  --   --   --   --   --  24   BUN  --   --   --   --   --  14   CR  --   --   --  0.54  --  0.64   * 227* 283*  --    < > 229*   SWATHI  --   --   --   --   --  8.9    < > = values in this interval not displayed.     Imaging:  CT Pelvis Soft Tissue w contrast (6/15/22)                                             IMPRESSION:  1.  Subcutaneous gas throughout the medial aspect of the right gluteal soft tissues coursing superiorly to the level of the right pelvic floor musculature which appears thickened and edematous. A single locule of gas is intimately adjacent to the levator   ani muscle belly, potentially though less likely within the muscle itself. There is no evidence of gas within the pelvis, though the findings are overall highly concerning for gas-forming soft tissue infection.     [Critical Result: Findings concerning for gas-forming soft tissue infection.]    Finding was identified on 6/15/2022 12:53 AM.     Assessment/Plan:   42 year old female with  history of HTN, DM2, and recent perianal abscess s/p I&D in the ED on 6/13, who presented to [facility] with increased perineal pain and drainage. CT pelvis showed subcutaneous gas along the right gluteal tissues extending to the pelvic floor, concerning for possible NSTI. She was transferred to [place] for urgent surgical I&D 6/15.     Today, patient remains afebrile and hemodynamically stable. On exam, wound was mildly tender to palpation, and packed dressing was removed at bedside. Wound was left open to drain.    - Continue IV Zosyn, Clindamycin, and Vancomycin  - No growth on blood cultures after 24 hours  - Advance to regular diet as tolerated     - - - - - - - - - - - - - - - - - -  Romy Barajas, MS4  [facility] Medical Student  06/16/2022    Pt seen and examined with Student Dr. Barajas. I agree with the documentation above as edited by me. The exam, assessment, and plan are my own.    Seen, examined, and discussed with chief resident, who will discuss with staff.    Donavon Ponce MD  General Surgery PGY-1

## 2022-06-16 NOTE — PHARMACY-VANCOMYCIN DOSING SERVICE
"Pharmacy Vancomycin Note  Date of Service 2022  Patient's  1980   42 year old, female, ABW 86.2 kg    Indication: Abscess and Skin and Soft Tissue Infection, R gluteal, c/f necrotizing fasciitis  Day of Therapy: 2  Current vancomycin regimen:  1500 mg IV q12h  Current vancomycin monitoring method: AUC  Current vancomycin therapeutic monitoring goal: 400-600 mg*h/L    Insight[employer] Prediction of Current Vancomycin Regimen      Current estimated CrCl = Estimated Creatinine Clearance: 141.9 mL/min (based on SCr of 0.56 mg/dL).    Creatinine for last 3 days  2022: 11:06 PM Creatinine 0.64 mg/dL  6/15/2022:  1:55 PM Creatinine 0.54 mg/dL  2022:  7:28 AM Creatinine 0.56 mg/dL    Recent Vancomycin Levels (past 3 days)  2022: 11:02 AM Vancomycin 12.4 mg/L - 9.75 hr trough    Vancomycin IV Administrations (past 72 hours)                   vancomycin 1500 mg in 0.9% NaCl 250 ml intermittent infusion 1,500 mg (mg) 1,500 mg New Bag 22 0118     1,500 mg New Bag 06/15/22 1515     1,500 mg New Bag  0319                Nephrotoxins and other renal medications (From now, onward)    Start     Dose/Rate Route Frequency Ordered Stop    06/15/22 1330  piperacillin-tazobactam (ZOSYN) 3.375 g vial to attach to  mL bag        Note to Pharmacy: For [facility], [facility] and [facility]: For Zosyn-naive patients, use the \"Zosyn initial dose + extended infusion\" order panel.    3.375 g  over 30 Minutes Intravenous EVERY 6 HOURS 06/15/22 1321      06/15/22 0200  vancomycin 1500 mg in 0.9% NaCl 250 ml intermittent infusion 1,500 mg         1,500 mg  over 90 Minutes Intravenous EVERY 12 HOURS 06/15/22 0056               Contrast Orders - past 72 hours (72h ago, onward)    Start     Dose/Rate Route Frequency Stop    06/15/22 0035  iopamidol (ISOVUE-370) solution 100 mL         100 mL Intravenous ONCE 06/15/22 0031          Interpretation of levels and current regimen:  Vancomycin level is reflective of -600    Has serum " creatinine changed greater than 50% in last 72 hours: No    Urine output:  good urine output, incomplete documentation but several RN notes mention 'voiding adequately'    Renal Function: Stable    Plan:  1. Continue Current Dose vancomycin 1500 mg IV q12h  2. Vancomycin monitoring method: AUC  3. Vancomycin therapeutic monitoring goal: 400-600 mg*h/L  4. Pharmacy will check vancomycin levels as appropriate in 1-3 Days.  5. Serum creatinine levels will be ordered daily for the first week of therapy and at least twice weekly for subsequent weeks.    Romeo Velásquez, PharmD, BCPS

## 2022-06-16 NOTE — PLAN OF CARE
Goal Outcome Evaluation:    Status:  Perianal Abscess    Neuro: A&O x4   GI: BS +NO BM passing flatus.  : Voiding with saving   Resp: Lung sounds present, 2 NC 96%   Mobility: SBA with bathroom.  Cardiac: Denies chest pain  Skin: R buttock incision packed with moist dressing intact.  Lines/Drains:PIV X2  Pain: Oxycodone managed the pain .  Behavior: Calm and co-operative.  VS:Blood pressure 124/75, pulse 59, temperature 97.7  F (36.5  C), temperature source Oral, resp. rate 16, last menstrual period 06/15/2022, SpO2 97 %, currently breastfeeding.

## 2022-06-16 NOTE — PROGRESS NOTES
CLINICAL NUTRITION SERVICES - ASSESSMENT NOTE     Nutrition Prescription    RECOMMENDATIONS FOR MDs/PROVIDERS TO ORDER:  Pt diabetic and has not eating within the last week, pt at some risk for re-feeding syndrome. Recommend monitoring K+, Mg++, PO4.     Malnutrition Status:    Patient does not meet two of the established criteria necessary for diagnosing malnutrition but is at risk for malnutrition.     Recommendations already ordered by Registered Dietitian (RD):  Start multivit with minerals due to poor pre-op PO intake and increased needs for wound healing.     Future/Additional Recommendations:  Diet adv v nutrition support within 2-3 days.        REASON FOR ASSESSMENT  Gloria Escobar is a/an 42 year old female assessed by the dietitian for Admission Nutrition Risk Screen for positive MST.     PMH  Per chart review pt has a history of HTN, DM2, and recent perianal abscess s/p I&D in the ED on 6/13, who presented to [facility] with increased perineal pain and drainage.    Per chart CT pelvis showed subcutaneous gas along the right gluteal tissues extending to the pelvic floor, concerning for possible NSTI. She was transferred to [place] for urgent surgical I&D 6/15.     NUTRITION HISTORY  Per chart review, pt reported nausea and decreased appetite prior to 6/14 admission, pt denies vomiting, diarrhea, or abdominal pain. Pt is diabetic, not on insulin and states that she has not checked her blood sugars recently.     Met with pt today, 6/16. Per pt recall, she has not experienced any wt loss, reports clothing having fit the same. She reports not having had an appetite for the last week r/t abdominal pain and discomfort. Pt reported her last meal of chicken soup on Tuesday at 4PM. Pt reports no improvement in appetite or hunger today.     CURRENT NUTRITION ORDERS  Diet: Clear Liquid  Intake/Tolerance: Pt reported trying apple juice today but it made her nauseous, only tolerating water right now.      LABS  Ketones: negative  WBC: 12.7, Hemoglobin: 10.2  Glucose: 195 (6/16). 229 (6/14)    GI   Per pt, last BM was Tuesday at 6PM. She reported it as normal and without pain.       MEDICATIONS  NovoLOG   Lactated ringers   Senna-docusate       ANTHROPOMETRICS  Height: 0 cm (Data Unavailable) 165.1cm   Most Recent Weight: 86.2 kg  (150% IBW)   IBW: 57 kg kg  BMI: Obesity Grade I BMI 30-34.9  Weight History: none available.    Dosing Weight: 64 AdjBW kg    ASSESSED NUTRITION NEEDS  Estimated Energy Needs: 5966-7098 kcals/day (20 - 25 kcals/kg)  Justification: Obese  Estimated Protein Needs: 77-96 grams protein/day (1.2 - 1.5 grams of pro/kg)  Justification: Post-op  Estimated Fluid Needs:  1 mL/kcal  Justification: Maintenance    PHYSICAL FINDINGS  See malnutrition section below.    MALNUTRITION  % Intake: </= 50% for >/= 5 days (severe)  % Weight Loss: None noted  Subcutaneous Fat Loss: None observed  Muscle Loss: None observed  Fluid Accumulation/Edema: None noted  Malnutrition Diagnosis: Patient does not meet two of the established criteria necessary for diagnosing malnutrition but is at risk for malnutrition.     NUTRITION DIAGNOSIS  Inadequate oral intake related to poor appetite and clear liquid status as evidenced by chart review and pt recall.       INTERVENTIONS  Implementation  Nutrition Education: No education needs assessed at this time       Goals  Diet adv v nutrition support within 2-3 days.     Monitoring/Evaluation  Progress toward goals will be monitored and evaluated per protocol.    Ashli Saravia, Dietetic Intern

## 2022-06-17 ENCOUNTER — APPOINTMENT (OUTPATIENT)
Dept: INTERPRETER SERVICES | Facility: CLINIC | Age: 42
End: 2022-06-17
Payer: MEDICAID

## 2022-06-17 VITALS
TEMPERATURE: 98.9 F | RESPIRATION RATE: 16 BRPM | DIASTOLIC BLOOD PRESSURE: 77 MMHG | OXYGEN SATURATION: 98 % | SYSTOLIC BLOOD PRESSURE: 132 MMHG | HEART RATE: 60 BPM

## 2022-06-17 LAB
ERYTHROCYTE [DISTWIDTH] IN BLOOD BY AUTOMATED COUNT: 12.3 % (ref 10–15)
GLUCOSE BLDC GLUCOMTR-MCNC: 179 MG/DL (ref 70–99)
GLUCOSE BLDC GLUCOMTR-MCNC: 183 MG/DL (ref 70–99)
HCT VFR BLD AUTO: 29.8 % (ref 35–47)
HGB BLD-MCNC: 10.1 G/DL (ref 11.7–15.7)
HOLD SPECIMEN: NORMAL
MCH RBC QN AUTO: 30.9 PG (ref 26.5–33)
MCHC RBC AUTO-ENTMCNC: 33.9 G/DL (ref 31.5–36.5)
MCV RBC AUTO: 91 FL (ref 78–100)
PLATELET # BLD AUTO: 282 10E3/UL (ref 150–450)
RBC # BLD AUTO: 3.27 10E6/UL (ref 3.8–5.2)
WBC # BLD AUTO: 11.3 10E3/UL (ref 4–11)

## 2022-06-17 PROCEDURE — 85014 HEMATOCRIT: CPT

## 2022-06-17 PROCEDURE — 250N000013 HC RX MED GY IP 250 OP 250 PS 637: Performed by: STUDENT IN AN ORGANIZED HEALTH CARE EDUCATION/TRAINING PROGRAM

## 2022-06-17 PROCEDURE — 36415 COLL VENOUS BLD VENIPUNCTURE: CPT

## 2022-06-17 PROCEDURE — 250N000011 HC RX IP 250 OP 636: Performed by: STUDENT IN AN ORGANIZED HEALTH CARE EDUCATION/TRAINING PROGRAM

## 2022-06-17 PROCEDURE — 250N000013 HC RX MED GY IP 250 OP 250 PS 637: Performed by: SURGERY

## 2022-06-17 PROCEDURE — 258N000003 HC RX IP 258 OP 636: Performed by: STUDENT IN AN ORGANIZED HEALTH CARE EDUCATION/TRAINING PROGRAM

## 2022-06-17 RX ADMIN — ACETAMINOPHEN 975 MG: 325 TABLET ORAL at 02:12

## 2022-06-17 RX ADMIN — VANCOMYCIN HYDROCHLORIDE 1500 MG: 10 INJECTION, POWDER, LYOPHILIZED, FOR SOLUTION INTRAVENOUS at 02:12

## 2022-06-17 RX ADMIN — OXYCODONE HYDROCHLORIDE 5 MG: 5 TABLET ORAL at 09:13

## 2022-06-17 RX ADMIN — ACETAMINOPHEN 975 MG: 325 TABLET ORAL at 10:11

## 2022-06-17 RX ADMIN — Medication 1 TABLET: at 08:14

## 2022-06-17 RX ADMIN — PIPERACILLIN AND TAZOBACTAM 3.38 G: 3; .375 INJECTION, POWDER, LYOPHILIZED, FOR SOLUTION INTRAVENOUS at 01:30

## 2022-06-17 RX ADMIN — AMOXICILLIN AND CLAVULANATE POTASSIUM 1 TABLET: 875; 125 TABLET, FILM COATED ORAL at 08:14

## 2022-06-17 RX ADMIN — SENNOSIDES AND DOCUSATE SODIUM 1 TABLET: 8.6; 5 TABLET ORAL at 08:14

## 2022-06-17 RX ADMIN — POLYETHYLENE GLYCOL 3350 17 G: 17 POWDER, FOR SOLUTION ORAL at 08:13

## 2022-06-17 RX ADMIN — CLINDAMYCIN IN 5 PERCENT DEXTROSE 900 MG: 18 INJECTION, SOLUTION INTRAVENOUS at 00:36

## 2022-06-17 RX ADMIN — INSULIN ASPART 1 UNITS: 100 INJECTION, SOLUTION INTRAVENOUS; SUBCUTANEOUS at 08:14

## 2022-06-17 ASSESSMENT — ACTIVITIES OF DAILY LIVING (ADL)
ADLS_ACUITY_SCORE: 24

## 2022-06-17 NOTE — OP NOTE
OPERATIVE REPORT    NAME: Gloria Escobar  MRN: [id number]     DATE OF SURGERY: 6/16/2022       PREOPERATIVE DIAGNOSIS: Perirectal abscess      POSTOPERATIVE DIAGNOSIS: Same as above      PROCEDURE: Irrigation and debridement of right gluteus and perineum      SURGEON: Dr. Hany Clemens      ASSISTANT(S): Dr. Selin Sosa, Resident    ANESTHESIA: General    EBL: 20  ml    SPECIMEN(S): Culture perineal abscess    FINDING(S):  Murky fluid with small amount of grey unhealthy fat medially, no evidence of maribel necrosis, no necrotizing component.     COMPLICATION(S): None    INDICATIONS: Gloria Escobar is a 42 year old female with history of HTN, DM2, and recent perianal abscess s/p I&D in the ED on 6/13, presented with increased perineal pain and drainage, leukocytosis 24.3, normal lactate, but CT pelvis with subcutaneous gas along the right glute extending to the pelvic floor, potentially related to prior I&D track, but cannot rule out NSTI.  Therefore after a discussion of the risks, benefits, and alternatives, the patient elected to undergo surgery for adequate drainage of abscess and exploration for NSTI.      DESCRIPTION OF PROCEDURE:  After obtaining informed consent, the patient was brought to the operating room and general anesthesia was induced. The patient was positioned supine in lithotomy. The perineum was prepped and draped in the usual sterile fashion. Preop antibiotics had been given prior to surgery. SCDs were placed and turned on. A timeout was performed.      Prior incision was noted anterolateral to the anal canal along the right lateral aspect of the perineum. There was active murky/purulent fluid draining from this. Lateral to this area we made a counter incision over the greatest area of induration and fluctuance. The wound tracked anteromedially to the prior incision, in addition to superiorly up to the pelvic floor. We encountered a small amount of grey appearing fat  superficially, but the remainder of soft tissue appeared healthy and viable, with adequate back bleeding on exploration. With blunt dissection we broke up loculations and completely drained the abscess pocket. We then copious irrigated the wound with the Pulsavac system. Hemostasis was achieved.     A penrose drain was then placed encompassing the primary I&D incision and our counter incision. This was secured with 2-0 nylon x2. Dry kerlix was used to pack the wound tightly to ensure hemostasis. The wound was covered with an ABD and mesh panties applied.     Surgical counts were reported as correct at the conclusion of the case x2. The patient was awakened from anesthesia and there were no immediate complications. Dr. Clemens was present for all portions of the operation.    Selin Sosa MD  General Surgery

## 2022-06-17 NOTE — DISCHARGE SUMMARY
Discharge Summary    Gloria Escobar MRN# [id number]   YOB: 1980 Age: 42 year old     Date of Admission:  6/14/2022  Date of Discharge::  6/17/2022  Admitting Physician:  Hany Clemens MD  Discharge Physician:  Willam Costello MD  Primary Care Physician:        [facility]          Admission Diagnoses:   Necrotizing soft tissue infection [M79.89]         Procedures:   Irrigation and debridement of right gluteus and perineum        Non-operative procedures:   None performed           Brief History of Illness:   Gloria Escobar is a 42 year old female with history of HTN, DM2, and recent perianal abscess s/p I&D in the ED on 6/13, sent home on keflex, who returned with increased pain and drainage from the wound. She states pain initially started last Wednesday, one week ago, acutely worsening on Friday, and has been constant since then. She initially came to the ED on 6/13 at which time she was noted to have a perianal abscess, an I&D cruciate incision was done and felt to be small pocket of loculated fluid, that did not require packing. She was discharge home on Keflex. Pain continued to worsen, and she continued to have light brown/clear yellowish drainage. She has not had fevers but feels chills. Otherwise felt okay, eating well, no changes in bowel or bladder habits, no pain with defecation. Has never had anything like this in the past. Is diabetic but has not checked her sugars at home, takes metformin.      She was initially evaluated on [facility] where she was noted to be tachycardic and hypertensive, received IVF, pancultured and start on vanc, zosyn and clindamycin. CT pelvis showed right gluteal soft tissue with subcutaneous gas tracking to the level of the pelvic floor, concerning for NSTI. She was therefore sent to [place] for surgical consultation.          Hospital Course:   42 year old female with history of HTN, DM2, and recent perianal abscess  s/p I&D in the ED on 6/13, who presented to [facility] with increased perineal pain and drainage. CT pelvis showed subcutaneous gas along the right gluteal tissues extending to the pelvic floor, concerning for possible NSTI. She was transferred to [place] for urgent surgical I&D 6/15, abscess drained, no necrotizing component.      On discharge day, he had return of bowel function, generally tolerating regular diet, voiding, and ambulating.    Post-operative pain control included tylenol and oxycodone, and will be Tylenol alone on discharge.     Physical Exam:  Temp:  [97.5  F (36.4  C)-98.9  F (37.2  C)] 98.9  F (37.2  C)  Pulse:  [60-61] 60  Resp:  [16] 16  BP: (119-132)/(72-86) 132/77  SpO2:  [98 %-99 %] 98 %    Gen: awake, alert, no acute distress, non-toxic appearing  Resp: breathing comfortably on room air  Abd: soft, nondistended, nontender  Incision: penrose drain in place, liquid stool is leaking from around the drain  Ext: warm and well-perfused, no edema         Consultations:   PHARMACY TO DOSE [facility]  NURSING TO CONSULT FOR VASCULAR ACCESS CARE IP CONSULT         Imaging Studies:   6/15 CT Pelvis  IMPRESSION:  1.  Subcutaneous gas throughout the medial aspect of the right gluteal soft tissues coursing superiorly to the level of the right pelvic floor musculature which appears thickened and edematous. A single locule of gas is intimately adjacent to the levator   ani muscle belly, potentially though less likely within the muscle itself. There is no evidence of gas within the pelvis, though the findings are overall highly concerning for gas-forming soft tissue infection.         Medications Prior to Admission:     Medications Prior to Admission   Medication Sig Dispense Refill Last Dose     acetaminophen (TYLENOL) 325 MG tablet Take 2 tablets (650 mg) by mouth every 6 hours as needed for mild pain Start after Delivery. 100 tablet 0 6/14/2022 at Unknown time     ibuprofen (ADVIL/MOTRIN) 600 MG tablet Take  1 tablet (600 mg) by mouth every 6 hours as needed for moderate pain Start after delivery 60 tablet 0 6/14/2022 at Unknown time     lisinopril (ZESTRIL) 20 MG tablet Take 20 mg by mouth daily   6/14/2022 at 0800     metFORMIN (GLUCOPHAGE-XR) 500 MG 24 hr tablet Take 1 tablet (500 mg) by mouth 2 times daily (with meals) 60 tablet 3 6/14/2022 at Unknown time     sulfamethoxazole-trimethoprim (BACTRIM DS) 800-160 MG tablet Take 1 tablet by mouth 2 times daily for 7 days 14 tablet 0 6/14/2022 at Unknown time     blood glucose (ACCU-CHEK GUIDE) test strip Use to test blood sugar 4 times daily (before breakfast and 1 hours after start of each meal). 150 strip 6      blood glucose (NO BRAND SPECIFIED) test strip Use to test blood sugar 4 times daily or as directed. 100 each 4      blood glucose monitoring (ACCU-CHEK FASTCLIX) lancets Use to check blood glucose 4 times daily 204 each 6      Blood Glucose Monitoring Suppl (ACCU-CHEK GUIDE ME) w/Device KIT 1 Device daily 1 kit 0      insulin pen needle (31G X 5 MM) 31G X 5 MM miscellaneous Use 4 pen needles daily or as directed. 200 each 3      NIFEdipine ER (ADALAT CC) 30 MG 24 hr tablet Take 1 tablet (30 mg) by mouth daily 60 tablet 1      Prenatal Vit-Fe Fumarate-FA (PRENATAL VITAMIN) 27-0.8 MG TABS Take 1 tablet by mouth daily 100 tablet 3      [DISCONTINUED] cephALEXin (KEFLEX) 500 MG capsule Take 1 capsule (500 mg) by mouth 4 times daily for 7 days 28 capsule 0 6/14/2022 at Unknown time          Discharge Medications:     Current Discharge Medication List      START taking these medications    Details   amoxicillin-clavulanate (AUGMENTIN) 875-125 MG tablet Take 1 tablet by mouth every 12 hours for 10 days  Qty: 20 tablet, Refills: 0    Associated Diagnoses: Necrotizing soft tissue infection         CONTINUE these medications which have NOT CHANGED    Details   acetaminophen (TYLENOL) 325 MG tablet Take 2 tablets (650 mg) by mouth every 6 hours as needed for mild pain  Start after Delivery.  Qty: 100 tablet, Refills: 0    Associated Diagnoses: High-risk pregnancy in third trimester      ibuprofen (ADVIL/MOTRIN) 600 MG tablet Take 1 tablet (600 mg) by mouth every 6 hours as needed for moderate pain Start after delivery  Qty: 60 tablet, Refills: 0    Associated Diagnoses: High-risk pregnancy in third trimester      lisinopril (ZESTRIL) 20 MG tablet Take 20 mg by mouth daily      metFORMIN (GLUCOPHAGE-XR) 500 MG 24 hr tablet Take 1 tablet (500 mg) by mouth 2 times daily (with meals)  Qty: 60 tablet, Refills: 3    Associated Diagnoses: Type 2 diabetes mellitus without complication, with long-term current use of insulin (H)      sulfamethoxazole-trimethoprim (BACTRIM DS) 800-160 MG tablet Take 1 tablet by mouth 2 times daily for 7 days  Qty: 14 tablet, Refills: 0      !! blood glucose (ACCU-CHEK GUIDE) test strip Use to test blood sugar 4 times daily (before breakfast and 1 hours after start of each meal).  Qty: 150 strip, Refills: 6    Associated Diagnoses: Type 2 diabetes mellitus without complication, with long-term current use of insulin (H)      !! blood glucose (NO BRAND SPECIFIED) test strip Use to test blood sugar 4 times daily or as directed.  Qty: 100 each, Refills: 4    Associated Diagnoses: Insulin controlled gestational diabetes mellitus (GDM) in third trimester      blood glucose monitoring (ACCU-CHEK FASTCLIX) lancets Use to check blood glucose 4 times daily  Qty: 204 each, Refills: 6    Associated Diagnoses: Type 2 diabetes mellitus without complication, with long-term current use of insulin (H)      Blood Glucose Monitoring Suppl (ACCU-CHEK GUIDE ME) w/Device KIT 1 Device daily  Qty: 1 kit, Refills: 0    Associated Diagnoses: Type 2 diabetes mellitus (H)      insulin pen needle (31G X 5 MM) 31G X 5 MM miscellaneous Use 4 pen needles daily or as directed.  Qty: 200 each, Refills: 3    Associated Diagnoses: Type 2 diabetes mellitus without complication, with long-term  current use of insulin (H)      NIFEdipine ER (ADALAT CC) 30 MG 24 hr tablet Take 1 tablet (30 mg) by mouth daily  Qty: 60 tablet, Refills: 1    Associated Diagnoses: Pre-eclampsia, delivered      Prenatal Vit-Fe Fumarate-FA (PRENATAL VITAMIN) 27-0.8 MG TABS Take 1 tablet by mouth daily  Qty: 100 tablet, Refills: 3    Associated Diagnoses: Multigravida of advanced maternal age in second trimester       !! - Potential duplicate medications found. Please discuss with provider.      STOP taking these medications       cephALEXin (KEFLEX) 500 MG capsule Comments:   Reason for Stopping:                  Medications Discontinued or Adjusted During This Hospitalization:   No change         Antibiotics Prescribed at Discharge:   Augmentin 875-125 q12 h, Duration: 10 days         Final Pathology Result:   Pending at time of discharge         Discharge Instructions and Follow-Up:     Discharge diet: Regular   Discharge activity: Activity as tolerated   Discharge follow-up: Follow up with surgery in 2 weeks, colorectal surgery PRN.    Tubes/Lines/Drains: Penrose drain in the wound (above the fascia)   Wound care: Daily dressing changes  Keep wound clean and dry  May get incision wet in shower but do not soak or scrub          Home Health Care:     Not needed           Discharge Disposition:     Discharged to home      Condition at discharge: Good    Pt discussed with staff.    Donavon Ponce MD  PGY1 N Surgery  6/17/2022  [phone]

## 2022-06-17 NOTE — PROGRESS NOTES
Surgery Progress Note  06/17/2022       Subjective:  No acute events overnight. Afebrile, stable vitals. This morning, patient feels well and reports mild pain in the right buttock. Per nursing, patient did not want to advance her diet yesterday and remained on liquid clears.     Objective:  Temp:  [97.5  F (36.4  C)-98.8  F (37.1  C)] 98.8  F (37.1  C)  Pulse:  [58-61] 60  Resp:  [16] 16  BP: (119-128)/(72-86) 119/72  SpO2:  [96 %-99 %] 99 %    No intake/output data recorded.      Gen: awake, alert, no acute distress, non-toxic appearing  Resp: breathing comfortably on room air  Abd: soft, nondistended, nontender  Incision: penrose drain in place, liquid stool is leaking from around the drain  Ext: warm and well-perfused, no edema     Labs:  Recent Labs   Lab 06/16/22  0728 06/14/22  2306   WBC 12.7* 24.3*   HGB 10.2* 11.7    287     Recent Labs   Lab 06/17/22  0225 06/16/22  2225 06/16/22  1723 06/16/22  0740 06/16/22  0728 06/15/22  1654 06/15/22  1355 06/15/22  0419 06/14/22  2306   NA  --   --   --   --  138  --   --   --  133   POTASSIUM  --   --   --   --  3.4  --   --   --  3.2*   CHLORIDE  --   --   --   --  106  --   --   --  99   CO2  --   --   --   --  22  --   --   --  24   BUN  --   --   --   --  7  --   --   --  14   CR  --   --   --   --  0.56  --  0.54  --  0.64   * 185* 218*   < > 195*   < >  --    < > 229*   SWATHI  --   --   --   --  8.0*  --   --   --  8.9   MAG  --   --   --   --  2.1  --   --   --   --    PHOS  --   --   --   --  4.0  --   --   --   --     < > = values in this interval not displayed.     Imaging:  No new imaging     Assessment/Plan:   42 year old female with history of HTN, DM2, and recent perianal abscess s/p I&D in the ED on 6/13, who presented to [facility] [place] [place] with increased perineal pain and drainage. CT pelvis showed subcutaneous gas along the right gluteal tissues extending to the pelvic floor, concerning for possible NSTI. She was transferred to [place] for  urgent surgical I&D 6/15. Leukocytosis improving, WBC 24 --> 12 yesterday.    Today, patient remains afebrile and hemodynamically stable. We discussed with patient that the penrose drain will stay in place for several weeks to fully drain the abscess cavity. Liquid stool was seen draining from the wound this morning, so we will touch base with colorectal surgery regarding inpatient assessment vs. outpatient follow-up in clinic in a few weeks. Patient's child is being baptized tomorrow morning at 11am and she wants to attend this event. We will transition to PO antibiotics today, advance to regular diet, and plan to discharge this afternoon or early tomorrow morning if she remains clinically stable and leukocytosis resolves.    - Transition antibiotics to PO Augmentin  - Discontinue fluids  - Advance to regular diet as tolerated  - Plan for patient to follow up with colorectal surgery as outpatient  - Discharge this afternoon or early tomorrow morning 6/18     - - - - - - - - - - - - - - - - - -  Romy Barajas, MS4  [facility] Medical Student  06/17/2022    Pt seen and examined with Student Dr. Barajas. I agree with the documentation above as edited by me. The exam, assessment, and plan are my own.    Seen, examined, and discussed with chief resident, who will discuss with staff.    Donavon Ponce MD  General Surgery PGY-1

## 2022-06-17 NOTE — PROGRESS NOTES
Patient discharged to home at 11:10 AM. Accompanied by daughter. Discharge instructions reviewed with patient using , opportunity offered to ask questions. IV access take out. All belongings sent with patient.    Michele Clayton RN

## 2022-06-17 NOTE — PLAN OF CARE
Vital signs:  Temp: 98.8  F (37.1  C) Temp src: Axillary BP: 119/72 Pulse: 60   Resp: 16 SpO2: 99 % O2 Device: None (Room air) Oxygen Delivery: 6 LPM      Activity: up ad alyssa/SBA  Neuros: WDL, A&O x4, calls appropriately. Fijian speaking.  Cardiac: WDL, VSS  Respiratory: WDL, sating well on Richard denies SOB  GI/: Voiding spontaneously. No BM this shift.   Diet: clears, tolerating well.   Skin: perianal abscess drainage open onto abd. Moderate amount of drainage.   Lines: PIV running LR at 100mL/hr and TKO with abx.   Labs: , 183  Pain/nausea: PRN oxycodone q4, PRN IV dilaudid given x1. Scheduled tylenol.   Plan: Continue POC.

## 2022-06-17 NOTE — PROGRESS NOTES
"SPIRITUAL HEALTH SERVICES Progress Note  [facility] 7B    Saw pt. Gloria per her request as a follow-up from yesterday's attempted visits. I used a  over the phone per Gloria's request. Gloria shared that she is feeling better but that her recent illness has been \"scary.\" Gloria is especially worried about her four daughters, who have not been able to visit. She reported feeling worried for their emotional well-being while she is convalescing. Gloria's  has been able to visit her, which has been a source of comfort. Gloria expressed some concerns regarding her own Holiness and wishes to speak with a  for prayer, support, and conversation. Contacted Fr. Brambila; he plans to make a follow-up visit today.     Kerwin Lewis MA   Intern  Pager [phone]      * [facility] remains available 24/7 for emergent requests/referrals, either by having the switchboard page the on-call  or by entering an ASAP/STAT consult in Epic (this will also page the on-call ). Routine Epic consults receive an initial response within 24 hours.*      "

## 2022-06-18 LAB
BACTERIA ABSC ANAEROBE+AEROBE CULT: ABNORMAL
BACTERIA ABSC ANAEROBE+AEROBE CULT: NORMAL

## 2022-06-20 ENCOUNTER — PATIENT OUTREACH (OUTPATIENT)
Dept: SURGERY | Facility: CLINIC | Age: 42
End: 2022-06-20
Payer: MEDICAID

## 2022-06-20 LAB
BACTERIA BLD CULT: NO GROWTH
BACTERIA BLD CULT: NO GROWTH

## 2022-06-20 NOTE — PROGRESS NOTES
RN Post-Op/Post-Discharge Care Coordination Note    Ms. Gloria Escobar is a 42 year old female who underwent irrigation and debridement of right gluteus and perineal wounds on 6/15 with  Dr. Hany Clemens.  Spoke with Patient via .    Support  Patient able to care for self independently     Health Status  Nausea/Vomiting: Patient denies nausea/vomiting.  Eating/drinking: Patient is able to eat and drink without any complaints.  Bowel habits: Patient reports having a normal bowel movement.  Drains (Penrose): Output small amount , Color whitish drainage  Fevers/chills: Patient denies any fever or chills.  Incisions: Patient denies any signs and symptoms of infection.  Pain: tolerable. Using OTC Tylenol per package instructions  New Medications: Augmentin    Activity/Restrictions  No restrictions    Equipment  Wound care supplies: gauze, saline    Pathology reviewed with patient:  N/A    Forms/Letters  Yes, letter sent via mail with RTW date 6/28 without restrictions    All of her questions were answered including reviewing restrictions and wound care.  She will call this office if she has any further questions and/or concerns.      PO appt scheduled with Dr. Marshall on 6/27 at 1500.    A copy of this note was routed to the primary surgeon.      Whom and When to Call  Patient acknowledges understanding of how to manage any medication changes and when to seek medical care.     Patient advised that if after hour medical concerns arise to please call [phone] and choose option 4 to speak to the physician on call.

## 2022-06-20 NOTE — LETTER
June 20, 2022      TO: Gloria Escobar  [address] [facility] [address]       To Whom It May Concern:    Gloria Escobar is under our professional care for an acute medical condition.  Ms. García Escobar underwent a surgical procedure and will require a period of recovery.  She is unable to return to work until 6/28/22 after her post operative appointment with Dr. Marshall. She may return to work without restrictions on 6/28/22.    Please contact our office with questions or concerns.    Sincerely,         Luna Cason RN on behalf of Dr. Hany Clemens

## 2022-06-24 ENCOUNTER — PATIENT OUTREACH (OUTPATIENT)
Dept: SURGERY | Facility: CLINIC | Age: 42
End: 2022-06-24

## 2022-06-24 NOTE — PROGRESS NOTES
Patient Telephone Reminder Call    Date of call:  06/24/22  Phone numbers:  Cell number on file:    Telephone Information:   Mobile [phone]       Reached patient/confirmed appointment:  Yes  Appointment with:   Dr. Familia Marshall  Reason for visit: drain check

## 2022-06-27 ENCOUNTER — OFFICE VISIT (OUTPATIENT)
Dept: SURGERY | Facility: CLINIC | Age: 42
End: 2022-06-27

## 2022-06-27 VITALS
BODY MASS INDEX: 31.04 KG/M2 | HEIGHT: 65 IN | SYSTOLIC BLOOD PRESSURE: 156 MMHG | HEART RATE: 65 BPM | OXYGEN SATURATION: 100 % | DIASTOLIC BLOOD PRESSURE: 97 MMHG | WEIGHT: 186.3 LBS

## 2022-06-27 DIAGNOSIS — M79.89 NECROTIZING SOFT TISSUE INFECTION: Primary | ICD-10-CM

## 2022-06-27 PROCEDURE — 99024 POSTOP FOLLOW-UP VISIT: CPT | Performed by: SURGERY

## 2022-06-27 ASSESSMENT — PAIN SCALES - GENERAL: PAINLEVEL: SEVERE PAIN (7)

## 2022-06-27 ASSESSMENT — PATIENT HEALTH QUESTIONNAIRE - PHQ9: SUM OF ALL RESPONSES TO PHQ QUESTIONS 1-9: 18

## 2022-06-27 NOTE — NURSING NOTE
"Chief Complaint   Patient presents with     RECHECK     Drain check       Vitals:    06/27/22 1507   BP: (!) 156/97   BP Location: Left arm   Patient Position: Sitting   Cuff Size: Adult Regular   Pulse: 65   SpO2: 100%   Weight: 84.5 kg (186 lb 4.8 oz)   Height: 1.651 m (5' 5\")       Body mass index is 31 kg/m .                          Luis Arauz, EMT  "

## 2022-06-27 NOTE — LETTER
"6/27/2022       RE: Gloria Escobar  [address]  [facility] [address]     Dear Colleague,    Thank you for referring your patient, Gloria Escobar, to the [facility] GENERAL SURGERY CLINIC [place] at [facility]. Please see a copy of my visit note below.      I saw Gloria Escobar in clinic on 6/27.  This was for planned follow-up after recent admission for a soft tissue infection in her perineal region. This was debrided with placement of a penrose drain.  She was discharged on 6/17.      Subjective fevers 3 days ago with chills.  She has been having nausea and did vomit this AM.  Has not been checking her blood sugars.  Does not currently use insulin- just oral meds.    The pain along the infected site is getting better.  Last day of ABX is today.  She is showering normally.      PE:  BP (!) 156/97 (BP Location: Left arm, Patient Position: Sitting, Cuff Size: Adult Regular)   Pulse 65   Ht 1.651 m (5' 5\")   Wt 84.5 kg (186 lb 4.8 oz)   LMP 06/15/2022 (Exact Date)   SpO2 100%   BMI 31.00 kg/m      A+Ox3, NAD  RRR  No resp distress or wheezing  The perineal wound is healing appropriately. No erythema, scant drainage.  Her incision site is widely patent around the 1/2inch penrose.  I replaced this with a 1/4 inch penrose      A/P:  Appropriate healing after debridement.  Wound still large but without active infection.  Will give the new, thinner penrose a few weeks to allow the wound to contract around it, hopefully remove it entirely at next visit.    -No further abx  -Return to clinic for wound check    Depression Response    Patient completed the PHQ-9 assessment for depression and scored >9? Yes, 18  Question 9 on the PHQ-9 was positive for suicidality? No  Does patient have current mental health provider? Unsure    Is this a virtual visit? No    I personally notified the following: clinic nurse       Luis " Regla, EMT      Sincerely,    Familia Marshall MD

## 2022-06-27 NOTE — PROGRESS NOTES
Depression Response    Patient completed the PHQ-9 assessment for depression and scored >9? Yes, 18  Question 9 on the PHQ-9 was positive for suicidality? No  Does patient have current mental health provider? Unsure    Is this a virtual visit? No    I personally notified the following: clinic nurse       Luis Arauz, EMT

## 2022-06-27 NOTE — PATIENT INSTRUCTIONS
You met with Dr. Familia Marshall.      Today's visit instructions:    Please speak with your primary care physician about your mental health at your next appointment with them.    Dr. Marshall exchanged your drain today. Please return to see him in two weeks as scheduled.        If you have questions please contact Ayaka RN or Luna RN during regular clinic hours, Monday through Friday 7:30 AM - 4:00 PM, or you can contact us via Thinkr at anytime.       If you have urgent needs after-hours, weekends, or holidays please call the hospital at [phone] and ask to speak with our on-call General Surgery Team.    Appointment [phone]  Nurse Advice (Ayaka or Luna): [phone]   Surgery Scheduler (Humberto): [phone]  Fax: [phone]

## 2022-06-27 NOTE — PROGRESS NOTES
"  I saw Gloria Escobar in clinic on 6/27.  This was for planned follow-up after recent admission for a soft tissue infection in her perineal region. This was debrided with placement of a penrose drain.  She was discharged on 6/17.      Subjective fevers 3 days ago with chills.  She has been having nausea and did vomit this AM.  Has not been checking her blood sugars.  Does not currently use insulin- just oral meds.    The pain along the infected site is getting better.  Last day of ABX is today.  She is showering normally.      PE:  BP (!) 156/97 (BP Location: Left arm, Patient Position: Sitting, Cuff Size: Adult Regular)   Pulse 65   Ht 1.651 m (5' 5\")   Wt 84.5 kg (186 lb 4.8 oz)   LMP 06/15/2022 (Exact Date)   SpO2 100%   BMI 31.00 kg/m      A+Ox3, NAD  RRR  No resp distress or wheezing  The perineal wound is healing appropriately. No erythema, scant drainage.  Her incision site is widely patent around the 1/2inch penrose.  I replaced this with a 1/4 inch penrose      A/P:  Appropriate healing after debridement.  Wound still large but without active infection.  Will give the new, thinner penrose a few weeks to allow the wound to contract around it, hopefully remove it entirely at next visit.    -No further abx  -Return to clinic for wound check  "

## 2022-06-27 NOTE — NURSING NOTE
Depression Screening Follow-up    PHQ 6/27/2022   PHQ-9 Total Score 18   Q9: Thoughts of better off dead/self-harm past 2 weeks Not at all       Does the patient currently have a mental health provider?  No  Follow Up Actions Taken  Patient to follow up with PCP. Clinic staff to schedule appointment if able. Patient states she has an appointment with her PCP on 7/15 and will bring this up with them then. She declines wanting a referral placed for Mental Health Provider.      Luna Cason RN

## 2022-07-12 ENCOUNTER — PATIENT OUTREACH (OUTPATIENT)
Dept: SURGERY | Facility: CLINIC | Age: 42
End: 2022-07-12

## 2022-07-12 NOTE — PROGRESS NOTES
Patient Telephone Reminder Call    Date of call:  07/12/22  Phone numbers:  Home number on file [phone] (home)    Reached patient/confirmed appointment:  Yes  Appointment with:   Dr. Familia Marshall  Reason for visit:  Drain check

## 2022-07-13 ENCOUNTER — OFFICE VISIT (OUTPATIENT)
Dept: SURGERY | Facility: CLINIC | Age: 42
End: 2022-07-13

## 2022-07-13 VITALS
HEART RATE: 57 BPM | BODY MASS INDEX: 30.96 KG/M2 | DIASTOLIC BLOOD PRESSURE: 84 MMHG | SYSTOLIC BLOOD PRESSURE: 128 MMHG | OXYGEN SATURATION: 96 % | WEIGHT: 185.8 LBS | HEIGHT: 65 IN

## 2022-07-13 DIAGNOSIS — M79.89 NECROTIZING SOFT TISSUE INFECTION: Primary | ICD-10-CM

## 2022-07-13 DIAGNOSIS — Z98.890 POST-OPERATIVE STATE: ICD-10-CM

## 2022-07-13 PROCEDURE — 99024 POSTOP FOLLOW-UP VISIT: CPT | Performed by: SURGERY

## 2022-07-13 ASSESSMENT — PAIN SCALES - GENERAL: PAINLEVEL: NO PAIN (0)

## 2022-07-13 NOTE — PATIENT INSTRUCTIONS
You met with Dr. Familia Marshall.      Today's visit instructions:    Return to see Dr. Marshall in 3 weeks as scheduled.       If you have questions please contact Ayaka RN or Luna RN during regular clinic hours, Monday through Friday 7:30 AM - 4:00 PM, or you can contact us via Sendori at anytime.       If you have urgent needs after-hours, weekends, or holidays please call the hospital at [phone] and ask to speak with our on-call General Surgery Team.    Appointment [phone]  Nurse Advice (Ayaka or Luna): [phone]   Surgery Scheduler (Humberto): [phone]  Fax: [phone]

## 2022-07-13 NOTE — PROGRESS NOTES
"I saw Gloria Escobar today in clinic for repeat wound check.  She is status post incision and drainage of a perineal wound.  I last saw her a few weeks ago and replaced a 1/2in penrose with a 1/4inch penrose drain.  She is seen today with the help of a phone .     Today she reports doing great. No fevers/chills/nausea/vomiting. Blood sugars \"controlled better\".    PE:  /84 (BP Location: Left arm, Patient Position: Sitting, Cuff Size: Adult Regular)   Pulse 57   Ht 1.651 m (5' 5\")   Wt 84.3 kg (185 lb 12.8 oz)   LMP 06/15/2022 (Exact Date)   SpO2 96%   BMI 30.92 kg/m    A+Ox3, NAD  RRR  No resp distress or wheezing  The incision site along the right perineal region is clean appearing. No drainage, no erythema.  There is some hypertrophic granulation tissue that I applied silver nitrate to.  I removed the penrose.    A/P:  Appropriate wound healing.  No sign of ongoing infection.  She may continue to cover the site with gauze. Daily sitz baths.  Return to clinic in 3 weeks for wound check.  "

## 2022-07-13 NOTE — NURSING NOTE
"Chief Complaint   Patient presents with     Post-Op - General Surgery     Drain Check       Vitals:    07/13/22 1045   BP: 128/84   BP Location: Left arm   Patient Position: Sitting   Cuff Size: Adult Regular   Pulse: 57   SpO2: 96%   Weight: 84.3 kg (185 lb 12.8 oz)   Height: 1.651 m (5' 5\")       Body mass index is 30.92 kg/m .                          Luis Arauz, EMT  "

## 2022-07-13 NOTE — LETTER
"7/13/2022       RE: Gloria Escobar  [address] [facility] [address]     Dear Colleague,    Thank you for referring your patient, Gloria Escobar, to the [facility] GENERAL SURGERY CLINIC [place] at [facility]. Please see a copy of my visit note below.    I saw Gloria Escobar today in clinic for repeat wound check.  She is status post incision and drainage of a perineal wound.  I last saw her a few weeks ago and replaced a 1/2in penrose with a 1/4inch penrose drain.  She is seen today with the help of a phone .     Today she reports doing great. No fevers/chills/nausea/vomiting. Blood sugars \"controlled better\".    PE:  /84 (BP Location: Left arm, Patient Position: Sitting, Cuff Size: Adult Regular)   Pulse 57   Ht 1.651 m (5' 5\")   Wt 84.3 kg (185 lb 12.8 oz)   LMP 06/15/2022 (Exact Date)   SpO2 96%   BMI 30.92 kg/m    A+Ox3, NAD  RRR  No resp distress or wheezing  The incision site along the right perineal region is clean appearing. No drainage, no erythema.  There is some hypertrophic granulation tissue that I applied silver nitrate to.  I removed the penrose.    A/P:  Appropriate wound healing.  No sign of ongoing infection.  She may continue to cover the site with gauze. Daily sitz baths.  Return to clinic in 3 weeks for wound check.      Sincerely,    Familia Marshall MD  "

## 2022-08-04 ENCOUNTER — PATIENT OUTREACH (OUTPATIENT)
Dept: SURGERY | Facility: CLINIC | Age: 42
End: 2022-08-04

## 2022-08-04 NOTE — PROGRESS NOTES
Patient Telephone Reminder Call    Date of call:  08/04/22  Phone numbers:  Home number on file [phone] (home)    Reached patient/confirmed appointment:  No - left message:   on voicemail with assistance of a   Appointment with:   Dr. Familia Marshall  Reason for visit:  PO

## 2022-08-05 ENCOUNTER — OFFICE VISIT (OUTPATIENT)
Dept: SURGERY | Facility: CLINIC | Age: 42
End: 2022-08-05
Payer: MEDICAID

## 2022-08-05 VITALS
HEIGHT: 65 IN | SYSTOLIC BLOOD PRESSURE: 144 MMHG | DIASTOLIC BLOOD PRESSURE: 82 MMHG | HEART RATE: 57 BPM | OXYGEN SATURATION: 98 % | WEIGHT: 188.6 LBS | BODY MASS INDEX: 31.42 KG/M2

## 2022-08-05 DIAGNOSIS — Z98.890 POST-OPERATIVE STATE: Primary | ICD-10-CM

## 2022-08-05 PROCEDURE — 17250 CHEM CAUT OF GRANLTJ TISSUE: CPT | Mod: 25 | Performed by: SURGERY

## 2022-08-05 NOTE — PROGRESS NOTES
"Return General Surgery Clinic Note    RE: Gloria Escobar  MR#: [id number]  : 1980  VISIT DATE: Aug 5, 2022    Dear [facility],    I had the pleasure of seeing your patient, Gloria Escobar, in my clinic.    CHIEF COMPLAINT: Follow up perineal wound    HISTORY OF PRESENT ILLNESS:  No flowsheet data found.   Gloria Escobar is a 42 year old woman who underwent debridement of a soft tissue infection of her perineum on 6/15/2022. She was last seen in clinic on 2022 at which point the Penrose drain in the wound was removed and some silver nitrate was applied to hypertrophic granulation tissue. Since then, has been doing very well and has no concerns or complaints. Blood sugars have been well controlled, no fevers/chills/n/v.    Medications:  Current Outpatient Medications   Medication     acetaminophen (TYLENOL) 325 MG tablet     blood glucose (ACCU-CHEK GUIDE) test strip     blood glucose (NO BRAND SPECIFIED) test strip     blood glucose monitoring (ACCU-CHEK FASTCLIX) lancets     Blood Glucose Monitoring Suppl (ACCU-CHEK GUIDE ME) w/Device KIT     insulin pen needle (31G X 5 MM) 31G X 5 MM miscellaneous     lisinopril (ZESTRIL) 20 MG tablet     metFORMIN (GLUCOPHAGE-XR) 500 MG 24 hr tablet     ibuprofen (ADVIL/MOTRIN) 600 MG tablet     NIFEdipine ER (ADALAT CC) 30 MG 24 hr tablet     Prenatal Vit-Fe Fumarate-FA (PRENATAL VITAMIN) 27-0.8 MG TABS     No current facility-administered medications for this visit.     No flowsheet data found.    LABS/IMAGING/MEDICAL RECORDS REVIEW: None    PHYSICAL EXAMINATION:  BP (!) 144/82 (BP Location: Left arm, Patient Position: Sitting, Cuff Size: Adult Regular)   Pulse 57   Ht 1.651 m (5' 5\")   Wt 85.5 kg (188 lb 9.6 oz)   LMP 06/15/2022 (Exact Date)   SpO2 98%   BMI 31.38 kg/m     Body mass index is 31.38 kg/m .     AAOx4, NAD  RRR, WWP  NLB on RA  The incision site along the right perineal/buttocks region is clean " appearing with mild hypergranulation tissue. No drainage, no erythema.  There is some hypertrophic granulation tissue at the drain sites that we applied silver nitrate to.    ASSESSMENT AND PLAN:    Gloria Escobar is a 42 year old female with a h/o HTN and DM who is s/p debridement of soft tissue infection of perineum on 6/15/2022 with Dr. Gonzales. She presents for a wound check in routine follow up. The wound is healing well without evidence of ongoing infection, almost completely healed.    1. Return to clinic in PRN.    Sincerely,    Nela Hale MD  PGY5 General Surgery Resident    Discussed with staff surgeon Dr. Marshall.

## 2022-08-05 NOTE — NURSING NOTE
"Chief Complaint   Patient presents with     Post-Op - General Surgery     3 week follow up       Vitals:    08/05/22 0711   BP: (!) 144/82   BP Location: Left arm   Patient Position: Sitting   Cuff Size: Adult Regular   Pulse: 57   SpO2: 98%   Weight: 85.5 kg (188 lb 9.6 oz)   Height: 1.651 m (5' 5\")       Body mass index is 31.38 kg/m .                          Luis Arauz, EMT    "

## 2022-08-05 NOTE — LETTER
2022       RE: Gloria Escobar  [address] [facility] [address]     Dear Colleague,    Thank you for referring your patient, Gloria Escobar, to the [facility] GENERAL SURGERY CLINIC [place] at [facility]. Please see a copy of my visit note below.    Return General Surgery Clinic Note    RE: Gloria Escobar  MR#: [id number]  : 1980  VISIT DATE: Aug 5, 2022    Dear [facility],    I had the pleasure of seeing your patient, Gloria Escobar, in my clinic.    CHIEF COMPLAINT: Follow up perineal wound    HISTORY OF PRESENT ILLNESS:  No flowsheet data found.   Gloria Escobar is a 42 year old woman who underwent debridement of a soft tissue infection of her perineum on 6/15/2022. She was last seen in clinic on 2022 at which point the Penrose drain in the wound was removed and some silver nitrate was applied to hypertrophic granulation tissue. Since then, has been doing very well and has no concerns or complaints. Blood sugars have been well controlled, no fevers/chills/n/v.    Medications:  Current Outpatient Medications   Medication     acetaminophen (TYLENOL) 325 MG tablet     blood glucose (ACCU-CHEK GUIDE) test strip     blood glucose (NO BRAND SPECIFIED) test strip     blood glucose monitoring (ACCU-CHEK FASTCLIX) lancets     Blood Glucose Monitoring Suppl (ACCU-CHEK GUIDE ME) w/Device KIT     insulin pen needle (31G X 5 MM) 31G X 5 MM miscellaneous     lisinopril (ZESTRIL) 20 MG tablet     metFORMIN (GLUCOPHAGE-XR) 500 MG 24 hr tablet     ibuprofen (ADVIL/MOTRIN) 600 MG tablet     NIFEdipine ER (ADALAT CC) 30 MG 24 hr tablet     Prenatal Vit-Fe Fumarate-FA (PRENATAL VITAMIN) 27-0.8 MG TABS     No current facility-administered medications for this visit.     No flowsheet data found.    LABS/IMAGING/MEDICAL RECORDS REVIEW: None    PHYSICAL EXAMINATION:  BP (!) 144/82  "(BP Location: Left arm, Patient Position: Sitting, Cuff Size: Adult Regular)   Pulse 57   Ht 1.651 m (5' 5\")   Wt 85.5 kg (188 lb 9.6 oz)   LMP 06/15/2022 (Exact Date)   SpO2 98%   BMI 31.38 kg/m     Body mass index is 31.38 kg/m .     AAOx4, NAD  RRR, WWP  NLB on RA  The incision site along the right perineal/buttocks region is clean appearing with mild hypergranulation tissue. No drainage, no erythema.  There is some hypertrophic granulation tissue at the drain sites that we applied silver nitrate to.    ASSESSMENT AND PLAN:    Gloria Escobar is a 42 year old female with a h/o HTN and DM who is s/p debridement of soft tissue infection of perineum on 6/15/2022 with Dr. Gonzales. She presents for a wound check in routine follow up. The wound is healing well without evidence of ongoing infection, almost completely healed.    1. Return to clinic in PRN.    Sincerely,    Nela Hale MD  PGY5 General Surgery Resident    Discussed with staff surgeon Dr. Marshall.       Attestation signed by Familia Marshall MD at 8/6/2022  7:11 AM:  I saw and examined the patient in clinic. She seems to have completely healed this perineal infection. No systemic signs of infection, she reports her glucose as controlled.  We applied silver nitrate to the site.      She may follow-up PRN.    Sincerely,    Familia Marshall MD      "

## 2022-08-05 NOTE — PATIENT INSTRUCTIONS
You met with Dr. Familia Marshall.      Today's visit instructions:    Return to the Surgery Clinic on an as needed basis.        If you have questions please contact Ayaka RN or Luna RN during regular clinic hours, Monday through Friday 7:30 AM - 4:00 PM, or you can contact us via SearchForce at anytime.       If you have urgent needs after-hours, weekends, or holidays please call the hospital at [phone] and ask to speak with our on-call General Surgery Team.    Appointment [phone]  Nurse Advice (Ayaka or Luna): [phone]   Surgery Scheduler (Humberto): [phone]  Fax: [phone]

## 2022-12-02 ENCOUNTER — APPOINTMENT (OUTPATIENT)
Dept: CT IMAGING | Facility: CLINIC | Age: 42
End: 2022-12-02
Attending: EMERGENCY MEDICINE
Payer: MEDICAID

## 2022-12-02 ENCOUNTER — HOSPITAL ENCOUNTER (EMERGENCY)
Facility: CLINIC | Age: 42
Discharge: HOME | End: 2022-12-02
Attending: EMERGENCY MEDICINE | Admitting: EMERGENCY MEDICINE
Payer: MEDICAID

## 2022-12-02 VITALS
HEART RATE: 75 BPM | SYSTOLIC BLOOD PRESSURE: 142 MMHG | DIASTOLIC BLOOD PRESSURE: 93 MMHG | OXYGEN SATURATION: 100 % | RESPIRATION RATE: 12 BRPM | WEIGHT: 186.2 LBS | BODY MASS INDEX: 30.99 KG/M2 | TEMPERATURE: 98.5 F

## 2022-12-02 DIAGNOSIS — J01.00 ACUTE NON-RECURRENT MAXILLARY SINUSITIS: ICD-10-CM

## 2022-12-02 LAB
ALBUMIN SERPL-MCNC: 3.8 G/DL (ref 3.4–5)
ALBUMIN UR-MCNC: NEGATIVE MG/DL
ALP SERPL-CCNC: 88 U/L (ref 40–150)
ALT SERPL W P-5'-P-CCNC: 18 U/L (ref 0–50)
ANION GAP SERPL CALCULATED.3IONS-SCNC: 8 MMOL/L (ref 3–14)
APPEARANCE UR: CLEAR
AST SERPL W P-5'-P-CCNC: 8 U/L (ref 0–45)
BASOPHILS # BLD AUTO: 0.1 10E3/UL (ref 0–0.2)
BASOPHILS NFR BLD AUTO: 0 %
BILIRUB SERPL-MCNC: 0.7 MG/DL (ref 0.2–1.3)
BILIRUB UR QL STRIP: NEGATIVE
BUN SERPL-MCNC: 10 MG/DL (ref 7–30)
CALCIUM SERPL-MCNC: 9.5 MG/DL (ref 8.5–10.1)
CHLORIDE BLD-SCNC: 104 MMOL/L (ref 94–109)
CO2 SERPL-SCNC: 27 MMOL/L (ref 20–32)
COLOR UR AUTO: ABNORMAL
CREAT SERPL-MCNC: 0.58 MG/DL (ref 0.52–1.04)
EOSINOPHIL # BLD AUTO: 0.2 10E3/UL (ref 0–0.7)
EOSINOPHIL NFR BLD AUTO: 1 %
ERYTHROCYTE [DISTWIDTH] IN BLOOD BY AUTOMATED COUNT: 12 % (ref 10–15)
FLUAV RNA SPEC QL NAA+PROBE: NEGATIVE
FLUBV RNA RESP QL NAA+PROBE: NEGATIVE
GFR SERPL CREATININE-BSD FRML MDRD: >90 ML/MIN/1.73M2
GLUCOSE BLD-MCNC: 146 MG/DL (ref 70–99)
GLUCOSE UR STRIP-MCNC: NEGATIVE MG/DL
HCG UR QL: NEGATIVE
HCT VFR BLD AUTO: 36.4 % (ref 35–47)
HGB BLD-MCNC: 12.5 G/DL (ref 11.7–15.7)
HGB UR QL STRIP: NEGATIVE
IMM GRANULOCYTES # BLD: 0 10E3/UL
IMM GRANULOCYTES NFR BLD: 0 %
KETONES UR STRIP-MCNC: 10 MG/DL
LEUKOCYTE ESTERASE UR QL STRIP: NEGATIVE
LYMPHOCYTES # BLD AUTO: 3.5 10E3/UL (ref 0.8–5.3)
LYMPHOCYTES NFR BLD AUTO: 25 %
MCH RBC QN AUTO: 30.8 PG (ref 26.5–33)
MCHC RBC AUTO-ENTMCNC: 34.3 G/DL (ref 31.5–36.5)
MCV RBC AUTO: 90 FL (ref 78–100)
MONOCYTES # BLD AUTO: 0.5 10E3/UL (ref 0–1.3)
MONOCYTES NFR BLD AUTO: 4 %
MUCOUS THREADS #/AREA URNS LPF: PRESENT /LPF
NEUTROPHILS # BLD AUTO: 9.8 10E3/UL (ref 1.6–8.3)
NEUTROPHILS NFR BLD AUTO: 70 %
NITRATE UR QL: NEGATIVE
NRBC # BLD AUTO: 0 10E3/UL
NRBC BLD AUTO-RTO: 0 /100
PH UR STRIP: 5.5 [PH] (ref 5–7)
PLATELET # BLD AUTO: 317 10E3/UL (ref 150–450)
POTASSIUM BLD-SCNC: 3.7 MMOL/L (ref 3.4–5.3)
PROT SERPL-MCNC: 8.3 G/DL (ref 6.8–8.8)
RBC # BLD AUTO: 4.06 10E6/UL (ref 3.8–5.2)
RBC URINE: 1 /HPF
RSV RNA SPEC NAA+PROBE: NEGATIVE
SARS-COV-2 RNA RESP QL NAA+PROBE: NEGATIVE
SODIUM SERPL-SCNC: 139 MMOL/L (ref 133–144)
SP GR UR STRIP: 1.01 (ref 1–1.03)
SQUAMOUS EPITHELIAL: 1 /HPF
UROBILINOGEN UR STRIP-MCNC: NORMAL MG/DL
WBC # BLD AUTO: 14.1 10E3/UL (ref 4–11)
WBC URINE: <1 /HPF

## 2022-12-02 PROCEDURE — 96374 THER/PROPH/DIAG INJ IV PUSH: CPT | Performed by: EMERGENCY MEDICINE

## 2022-12-02 PROCEDURE — 82040 ASSAY OF SERUM ALBUMIN: CPT | Performed by: EMERGENCY MEDICINE

## 2022-12-02 PROCEDURE — 250N000011 HC RX IP 250 OP 636: Performed by: EMERGENCY MEDICINE

## 2022-12-02 PROCEDURE — 81001 URINALYSIS AUTO W/SCOPE: CPT | Performed by: EMERGENCY MEDICINE

## 2022-12-02 PROCEDURE — 99284 EMERGENCY DEPT VISIT MOD MDM: CPT | Performed by: EMERGENCY MEDICINE

## 2022-12-02 PROCEDURE — 258N000003 HC RX IP 258 OP 636

## 2022-12-02 PROCEDURE — 85004 AUTOMATED DIFF WBC COUNT: CPT | Performed by: EMERGENCY MEDICINE

## 2022-12-02 PROCEDURE — 36415 COLL VENOUS BLD VENIPUNCTURE: CPT | Performed by: EMERGENCY MEDICINE

## 2022-12-02 PROCEDURE — 96375 TX/PRO/DX INJ NEW DRUG ADDON: CPT | Performed by: EMERGENCY MEDICINE

## 2022-12-02 PROCEDURE — 87637 SARSCOV2&INF A&B&RSV AMP PRB: CPT | Performed by: EMERGENCY MEDICINE

## 2022-12-02 PROCEDURE — 81025 URINE PREGNANCY TEST: CPT | Performed by: EMERGENCY MEDICINE

## 2022-12-02 PROCEDURE — 70450 CT HEAD/BRAIN W/O DYE: CPT

## 2022-12-02 PROCEDURE — 96361 HYDRATE IV INFUSION ADD-ON: CPT | Performed by: EMERGENCY MEDICINE

## 2022-12-02 PROCEDURE — C9803 HOPD COVID-19 SPEC COLLECT: HCPCS | Performed by: EMERGENCY MEDICINE

## 2022-12-02 PROCEDURE — 258N000003 HC RX IP 258 OP 636: Performed by: EMERGENCY MEDICINE

## 2022-12-02 PROCEDURE — 99285 EMERGENCY DEPT VISIT HI MDM: CPT | Mod: 25 | Performed by: EMERGENCY MEDICINE

## 2022-12-02 PROCEDURE — 250N000013 HC RX MED GY IP 250 OP 250 PS 637: Performed by: EMERGENCY MEDICINE

## 2022-12-02 PROCEDURE — 80053 COMPREHEN METABOLIC PANEL: CPT | Performed by: EMERGENCY MEDICINE

## 2022-12-02 RX ORDER — IBUPROFEN 200 MG
400 TABLET ORAL ONCE
Status: COMPLETED | OUTPATIENT
Start: 2022-12-02 | End: 2022-12-02

## 2022-12-02 RX ORDER — IBUPROFEN 200 MG
400 TABLET ORAL 3 TIMES DAILY
Qty: 42 TABLET | Refills: 0 | Status: SHIPPED | OUTPATIENT
Start: 2022-12-02

## 2022-12-02 RX ORDER — SODIUM CHLORIDE 9 MG/ML
INJECTION, SOLUTION INTRAVENOUS
Status: COMPLETED
Start: 2022-12-02 | End: 2022-12-02

## 2022-12-02 RX ORDER — FLUTICASONE PROPIONATE 50 MCG
1 SPRAY, SUSPENSION (ML) NASAL DAILY
Qty: 15.8 ML | Refills: 0 | Status: SHIPPED | OUTPATIENT
Start: 2022-12-02

## 2022-12-02 RX ORDER — SODIUM CHLORIDE 9 MG/ML
INJECTION, SOLUTION INTRAVENOUS CONTINUOUS
Status: DISCONTINUED | OUTPATIENT
Start: 2022-12-02 | End: 2022-12-03 | Stop reason: HOSPADM

## 2022-12-02 RX ORDER — ACETAMINOPHEN 500 MG
1000 TABLET ORAL 3 TIMES DAILY
Qty: 42 TABLET | Refills: 0 | Status: SHIPPED | OUTPATIENT
Start: 2022-12-02 | End: 2022-12-09

## 2022-12-02 RX ORDER — ACETAMINOPHEN 500 MG
1000 TABLET ORAL ONCE
Status: COMPLETED | OUTPATIENT
Start: 2022-12-02 | End: 2022-12-02

## 2022-12-02 RX ADMIN — PROCHLORPERAZINE EDISYLATE 10 MG: 5 INJECTION INTRAMUSCULAR; INTRAVENOUS at 21:08

## 2022-12-02 RX ADMIN — IBUPROFEN 400 MG: 200 TABLET, FILM COATED ORAL at 23:26

## 2022-12-02 RX ADMIN — SODIUM CHLORIDE: 9 INJECTION, SOLUTION INTRAVENOUS at 21:59

## 2022-12-02 RX ADMIN — ACETAMINOPHEN 1000 MG: 500 TABLET ORAL at 19:27

## 2022-12-02 RX ADMIN — METOCLOPRAMIDE HYDROCHLORIDE 10 MG: 5 INJECTION INTRAMUSCULAR; INTRAVENOUS at 19:35

## 2022-12-02 RX ADMIN — SODIUM CHLORIDE 1000 ML: 9 INJECTION, SOLUTION INTRAVENOUS at 19:35

## 2022-12-02 RX ADMIN — SODIUM CHLORIDE: 9 INJECTION, SOLUTION INTRAVENOUS at 21:55

## 2022-12-02 ASSESSMENT — ACTIVITIES OF DAILY LIVING (ADL)
ADLS_ACUITY_SCORE: 35
ADLS_ACUITY_SCORE: 33
ADLS_ACUITY_SCORE: 35

## 2022-12-02 NOTE — ED TRIAGE NOTES
pt has a headache and increased blood pressure.  148/150/1522 SBP at home. Pt states she has left side facial swelling inside.  Headache has been present for 2 days.  Her children had some kind of virus RSV at home.  Pain doesn't go away with oral OTC meds.

## 2022-12-03 ASSESSMENT — ENCOUNTER SYMPTOMS
ABDOMINAL PAIN: 0
SHORTNESS OF BREATH: 0
FEVER: 0

## 2022-12-03 NOTE — ED PROVIDER NOTES
[facility] EMERGENCY DEPARTMENT ([facility])    12/02/22      ED PROVIDER NOTE   ED 4 6:40 PM     History     Chief Complaint   Patient presents with     Headache     The history is provided by the patient and medical records. A  was used ( via iPad).     Gloria Escobar is a 42 year old Yoruba speaking female with history of hypertension, type 2 diabetes who presents with headache. She states her blood pressure has been high for the past 2 days and she has had a bad headache with this. The left side of her face feels numb or swollen. She states she has had headache symptoms about a week but it was very light. She states it became stronger yesterday and today she couldn't take it anymore. The pain is unrelenting. She states she has taken Tylenol 1 g at 11:30 AM today without improvement. Her blood pressures have been 140-160s at home. She notes recent sick contact from her 2 year old daughter who was sick 2 weeks ago with RSV. A week after her daughter tested positive for RSV patient herself had RSV symptoms all last week with cough. She would notice increased head pain with the cough and now for the past 2 days the headache has been unrelenting. She did have fevers last week, none currently. She has not had headache like this before, states her head feels like its going to explode. No neck pain. She notes when she is more stressed out her forehead is more painful. She also has noticed the left side of her face fluids swollen. The headache is fixed in her frontal head, no posterior headache. When she eats her mouth and gums on left side feels swollen. No dental pain. She has had some nausea when she goes outside and gets hit by a cold wind but no vomiting. She does have a history of diabetes and hypertension. She is on lisinopril and metformin. No other symptoms. No chest pain. No shortness of breath currently. No hematuria, dysuria. Per [facility] records,  she has had 2 doses of Moderna COVID-19 vaccination, no flu shot. She denies any medication allergies.     Past Medical History  Past Medical History:   Diagnosis Date     Hypertension      Type 2 diabetes mellitus (H) .     Past Surgical History:   Procedure Laterality Date      SECTION N/A 10/22/2020    Procedure:  SECTION;  Surgeon: Geraldine Ferro MD;  Location: [facility] L[facility]D     CHOLECYSTECTOMY       cholesetomy       INCISION AND DRAINAGE TRUNK, COMBINED N/A 6/15/2022    Procedure: irrigation and debridement of right gluteus and perineum;  Surgeon: Hany Clemens MD;  Location: [facility] OR     lisinopril (ZESTRIL) 20 MG tablet  metFORMIN (GLUCOPHAGE-XR) 500 MG 24 hr tablet  acetaminophen (TYLENOL) 325 MG tablet  blood glucose (ACCU-CHEK GUIDE) test strip  blood glucose (NO BRAND SPECIFIED) test strip  blood glucose monitoring (ACCU-CHEK FASTCLIX) lancets  Blood Glucose Monitoring Suppl (ACCU-CHEK GUIDE ME) w/Device KIT  ibuprofen (ADVIL/MOTRIN) 600 MG tablet  insulin pen needle (31G X 5 MM) 31G X 5 MM miscellaneous  NIFEdipine ER (ADALAT CC) 30 MG 24 hr tablet  Prenatal Vit-Fe Fumarate-FA (PRENATAL VITAMIN) 27-0.8 MG TABS      No Known Allergies  Family History  Family History   Problem Relation Age of Onset     Diabetes Mother         type 2      Thyroid Cancer Mother      Diabetes Father         type 2     Social History   Social History     Tobacco Use     Smoking status: Never     Smokeless tobacco: Never   Substance Use Topics     Alcohol use: No     Drug use: No      Past medical history, past surgical history, medications, allergies, family history, and social history were reviewed with the patient. No additional pertinent items.       Review of Systems   Constitutional: Negative for fever.   Respiratory: Negative for shortness of breath.    Cardiovascular: Negative for chest pain.   Gastrointestinal: Negative for abdominal pain.   All other systems reviewed and are negative.    A complete  review of systems was performed with pertinent positives and negatives noted in the HPI, and all other systems negative.    Physical Exam   BP: (!) 153/84  Pulse: 86  Temp: 98.5  F (36.9  C)  Resp: 12  Weight: 84.5 kg (186 lb 3.2 oz)  SpO2: 96 %  Physical Exam  Vitals and nursing note reviewed.   Constitutional:       General: She is not in acute distress.     Appearance: Normal appearance. She is not diaphoretic.   HENT:      Head: Atraumatic.      Mouth/Throat:      Pharynx: No oropharyngeal exudate.   Eyes:      General: No scleral icterus.     Pupils: Pupils are equal, round, and reactive to light.   Cardiovascular:      Rate and Rhythm: Normal rate and regular rhythm.      Heart sounds: Normal heart sounds.   Pulmonary:      Effort: No respiratory distress.      Breath sounds: Normal breath sounds.   Abdominal:      General: Bowel sounds are normal.      Palpations: Abdomen is soft.      Tenderness: There is no abdominal tenderness.   Musculoskeletal:         General: No tenderness.   Skin:     General: Skin is warm.      Findings: No rash.   Neurological:      General: No focal deficit present.      Mental Status: She is alert and oriented to person, place, and time.           ED Course      Procedures                   No results found for any visits on 12/02/22.  Medications - No data to display     Assessments & Plan (with Medical Decision Making)     42 year old Vietnamese speaking female with history of hypertension, type 2 diabetes who presents with headache. She states her blood pressure has been high for the past 2 days and she has had a bad headache with this.  Vital signs in triage notable for blood pressure elevated 153/84 but otherwise afebrile and stable including normal pulse ox at 96% on room air.  IV established, labs drawn sent reviewed document epic essentially all unremarkable normal CBC electrolytes, UA bland.  Patient is given a gram of Tylenol liter normal saline and Reglan 10 mg IV  piggyback.  Upon repeat assessment patient symptoms have improved.  She is sent to CT for imaging of the head which reveals maxillary and frontal sinus disease but otherwise no intracranial process.  She was given Compazine 10 mg IV and ibuprofen 4 mg p.o.  Upon repeat assessment patient's symptoms had resolved.  She was discharged home with prescriptions for analgesics and Flonase and plan to follow-up with primary care provider for further evaluation and care.    I have reviewed the nursing notes. I have reviewed the findings, diagnosis, plan and need for follow up with the patient.    New Prescriptions    No medications on file       Final diagnoses:   Acute non-recurrent maxillary sinusitis     I, Shannon Christianson, am serving as a trained medical scribe to document services personally performed by Camilla Polo MD based on the provider's statements to me on December 2, 2022.  This document has been checked and approved by the attending provider.    ICamilla MD, was physically present and have reviewed and verified the accuracy of this note documented by Shannon Christianson, medical scribe.      Camilla Polo MD     [facility] EMERGENCY DEPARTMENT  12/2/2022     Camilla Polo MD  12/03/22 0048

## 2023-01-07 NOTE — Clinical Note
[facility] Tetracycline Counseling: Patient counseled regarding possible photosensitivity and increased risk for sunburn.  Patient instructed to avoid sunlight, if possible.  When exposed to sunlight, patients should wear protective clothing, sunglasses, and sunscreen.  The patient was instructed to call the office immediately if the following severe adverse effects occur:  hearing changes, easy bruising/bleeding, severe headache, or vision changes.  The patient verbalized understanding of the proper use and possible adverse effects of tetracycline.  All of the patient's questions and concerns were addressed. Patient understands to avoid pregnancy while on therapy due to potential birth defects.

## (undated) DEVICE — PREP CHLORAPREP 26ML TINTED ORANGE  260815

## (undated) DEVICE — TEST TUBE W/SCREW CAP 17361

## (undated) DEVICE — LINEN TOWEL PACK X6 WHITE 5487

## (undated) DEVICE — SU ETHILON 2-0 FS 18" 664H

## (undated) DEVICE — SU PLAIN 0 TIE 54" S104H

## (undated) DEVICE — SU VICRYL 0 CT-1 36" J346H

## (undated) DEVICE — DRAPE MAYO STAND 23X54 8337

## (undated) DEVICE — CATH TRAY FOLEY 16FR BARDEX W/DRAIN BAG STATLOCK 300316A

## (undated) DEVICE — DRSG ABDOMINAL 07 1/2X8" 7197D

## (undated) DEVICE — DRAPE LEGGINGS CLEAR 8430

## (undated) DEVICE — STRAP KNEE/BODY 31143004

## (undated) DEVICE — DRSG GAUZE FLUFTEX RADIOPAQUE 4.5"X4.1YD 11-020

## (undated) DEVICE — BONE CLEANING TIP INTERPULSE  0210-010-000

## (undated) DEVICE — SUCTION CANISTER MEDIVAC LINER 1500ML W/LID 65651-515

## (undated) DEVICE — GLOVE ESTEEM POWDER FREE SMT 6.5  2D72PT65

## (undated) DEVICE — ESU GROUND PAD ADULT W/CORD E7507

## (undated) DEVICE — BASIN SET MAJOR

## (undated) DEVICE — SU MONOCRYL 4-0 PS-2 18" UND Y496G

## (undated) DEVICE — Device

## (undated) DEVICE — SOL WATER IRRIG 1000ML BOTTLE 07139-09

## (undated) DEVICE — PACK C-SECTION LF PL15OTA83B

## (undated) DEVICE — SOL NACL 0.9% IRRIG 1000ML BOTTLE 07138-09

## (undated) DEVICE — DRAPE SHEET REV FOLD 3/4 9349

## (undated) DEVICE — DRAPE U SPLIT 74X120" 29440

## (undated) DEVICE — ESU PENCIL W/HOLSTER

## (undated) DEVICE — PREP CHLORAPREP 26ML TINTED HI-LITE ORANGE 930815

## (undated) DEVICE — ESU GROUND PAD UNIVERSAL W/O CORD

## (undated) DEVICE — SUCTION MANIFOLD NEPTUNE 2 SYS 4 PORT 0702-020-000

## (undated) DEVICE — DRAIN PENROSE 5/8X18" LATEX 30416-058

## (undated) DEVICE — LINEN TOWEL PACK X30 5481

## (undated) DEVICE — GLOVE PROTEXIS BLUE W/NEU-THERA 6.5  2D73EB65

## (undated) DEVICE — APPLICATORS COTTON TIP 6"X2 STERILE LF C15053-006

## (undated) DEVICE — STOCKING SLEEVE COMPRESSION CALF MED

## (undated) DEVICE — SUCTION IRR SYSTEM W/O TIP INTERPULSE HANDPIECE 0210-100-000

## (undated) RX ORDER — ACETAMINOPHEN 325 MG/1
TABLET ORAL
Status: DISPENSED
Start: 2022-06-15

## (undated) RX ORDER — LABETALOL 20 MG/4 ML (5 MG/ML) INTRAVENOUS SYRINGE
Status: DISPENSED
Start: 2020-10-22

## (undated) RX ORDER — PROPOFOL 10 MG/ML
INJECTION, EMULSION INTRAVENOUS
Status: DISPENSED
Start: 2022-06-15

## (undated) RX ORDER — HYDROMORPHONE HYDROCHLORIDE 1 MG/ML
INJECTION, SOLUTION INTRAMUSCULAR; INTRAVENOUS; SUBCUTANEOUS
Status: DISPENSED
Start: 2022-06-15

## (undated) RX ORDER — FENTANYL CITRATE 50 UG/ML
INJECTION, SOLUTION INTRAMUSCULAR; INTRAVENOUS
Status: DISPENSED
Start: 2020-10-22

## (undated) RX ORDER — OXYTOCIN/0.9 % SODIUM CHLORIDE 30/500 ML
PLASTIC BAG, INJECTION (ML) INTRAVENOUS
Status: DISPENSED
Start: 2020-10-22

## (undated) RX ORDER — PIPERACILLIN SODIUM, TAZOBACTAM SODIUM 3; .375 G/15ML; G/15ML
INJECTION, POWDER, LYOPHILIZED, FOR SOLUTION INTRAVENOUS
Status: DISPENSED
Start: 2022-06-15

## (undated) RX ORDER — KETOROLAC TROMETHAMINE 30 MG/ML
INJECTION, SOLUTION INTRAMUSCULAR; INTRAVENOUS
Status: DISPENSED
Start: 2022-06-15

## (undated) RX ORDER — FENTANYL CITRATE-0.9 % NACL/PF 10 MCG/ML
PLASTIC BAG, INJECTION (ML) INTRAVENOUS
Status: DISPENSED
Start: 2022-06-15

## (undated) RX ORDER — MORPHINE SULFATE 1 MG/ML
INJECTION, SOLUTION EPIDURAL; INTRATHECAL; INTRAVENOUS
Status: DISPENSED
Start: 2020-10-22

## (undated) RX ORDER — FENTANYL CITRATE-0.9 % NACL/PF 10 MCG/ML
PLASTIC BAG, INJECTION (ML) INTRAVENOUS
Status: DISPENSED
Start: 2020-10-22

## (undated) RX ORDER — EPHEDRINE SULFATE 50 MG/ML
INJECTION, SOLUTION INTRAMUSCULAR; INTRAVENOUS; SUBCUTANEOUS
Status: DISPENSED
Start: 2022-06-15

## (undated) RX ORDER — KETOROLAC TROMETHAMINE 30 MG/ML
INJECTION, SOLUTION INTRAMUSCULAR; INTRAVENOUS
Status: DISPENSED
Start: 2020-10-22

## (undated) RX ORDER — FENTANYL CITRATE 50 UG/ML
INJECTION, SOLUTION INTRAMUSCULAR; INTRAVENOUS
Status: DISPENSED
Start: 2022-06-15

## (undated) RX ORDER — ACETAMINOPHEN 325 MG/1
TABLET ORAL
Status: DISPENSED
Start: 2020-10-22

## (undated) RX ORDER — ONDANSETRON 2 MG/ML
INJECTION INTRAMUSCULAR; INTRAVENOUS
Status: DISPENSED
Start: 2022-06-15

## (undated) RX ORDER — OXYCODONE HYDROCHLORIDE 5 MG/1
TABLET ORAL
Status: DISPENSED
Start: 2022-06-15

## (undated) RX ORDER — ONDANSETRON 2 MG/ML
INJECTION INTRAMUSCULAR; INTRAVENOUS
Status: DISPENSED
Start: 2020-10-22